# Patient Record
Sex: MALE | Race: BLACK OR AFRICAN AMERICAN | Employment: OTHER
[De-identification: names, ages, dates, MRNs, and addresses within clinical notes are randomized per-mention and may not be internally consistent; named-entity substitution may affect disease eponyms.]

---

## 2023-11-09 ENCOUNTER — OFFICE VISIT (OUTPATIENT)
Facility: CLINIC | Age: 78
End: 2023-11-09
Payer: MEDICARE

## 2023-11-09 ENCOUNTER — HOSPITAL ENCOUNTER (OUTPATIENT)
Facility: HOSPITAL | Age: 78
Setting detail: SPECIMEN
Discharge: HOME | End: 2023-11-09
Payer: MEDICARE

## 2023-11-09 VITALS
BODY MASS INDEX: 21.56 KG/M2 | OXYGEN SATURATION: 96 % | SYSTOLIC BLOOD PRESSURE: 152 MMHG | HEART RATE: 67 BPM | DIASTOLIC BLOOD PRESSURE: 84 MMHG | HEIGHT: 74 IN | RESPIRATION RATE: 22 BRPM | TEMPERATURE: 96.8 F | WEIGHT: 168 LBS

## 2023-11-09 DIAGNOSIS — L85.3 DRY SKIN: ICD-10-CM

## 2023-11-09 DIAGNOSIS — R79.9 ABNORMAL FINDING OF BLOOD CHEMISTRY, UNSPECIFIED: ICD-10-CM

## 2023-11-09 DIAGNOSIS — F03.90 DEMENTIA, UNSPECIFIED DEMENTIA SEVERITY, UNSPECIFIED DEMENTIA TYPE, UNSPECIFIED WHETHER BEHAVIORAL, PSYCHOTIC, OR MOOD DISTURBANCE OR ANXIETY (HCC): ICD-10-CM

## 2023-11-09 DIAGNOSIS — B35.1 ONYCHOMYCOSIS: ICD-10-CM

## 2023-11-09 DIAGNOSIS — R26.2 AMBULATORY DYSFUNCTION: ICD-10-CM

## 2023-11-09 DIAGNOSIS — R53.1 WEAKNESS: ICD-10-CM

## 2023-11-09 DIAGNOSIS — Z11.59 ENCOUNTER FOR HEPATITIS C SCREENING TEST FOR LOW RISK PATIENT: ICD-10-CM

## 2023-11-09 DIAGNOSIS — N18.6 ESRD (END STAGE RENAL DISEASE) (HCC): ICD-10-CM

## 2023-11-09 DIAGNOSIS — I10 PRIMARY HYPERTENSION: ICD-10-CM

## 2023-11-09 DIAGNOSIS — Z86.718 HISTORY OF DVT (DEEP VEIN THROMBOSIS): ICD-10-CM

## 2023-11-09 DIAGNOSIS — N18.5 CKD (CHRONIC KIDNEY DISEASE) STAGE 5, GFR LESS THAN 15 ML/MIN (HCC): ICD-10-CM

## 2023-11-09 DIAGNOSIS — H91.90 HEARING LOSS, UNSPECIFIED HEARING LOSS TYPE, UNSPECIFIED LATERALITY: ICD-10-CM

## 2023-11-09 DIAGNOSIS — D63.8 ANEMIA OF CHRONIC DISEASE: ICD-10-CM

## 2023-11-09 DIAGNOSIS — S81.802A OPEN WOUND OF LEFT LOWER EXTREMITY, INITIAL ENCOUNTER: Primary | ICD-10-CM

## 2023-11-09 DIAGNOSIS — Z91.81 AT HIGH RISK FOR FALLS: ICD-10-CM

## 2023-11-09 DIAGNOSIS — Z76.89 ESTABLISHING CARE WITH NEW DOCTOR, ENCOUNTER FOR: ICD-10-CM

## 2023-11-09 LAB
ALBUMIN SERPL-MCNC: 2.7 G/DL (ref 3.4–5)
ALBUMIN/GLOB SERPL: 0.5 (ref 0.8–1.7)
ALP SERPL-CCNC: 104 U/L (ref 45–117)
ALT SERPL-CCNC: 18 U/L (ref 16–61)
ANION GAP SERPL CALC-SCNC: 6 MMOL/L (ref 3–18)
AST SERPL-CCNC: 18 U/L (ref 10–38)
BASOPHILS # BLD: 0.1 K/UL (ref 0–0.1)
BASOPHILS NFR BLD: 1 % (ref 0–2)
BILIRUB SERPL-MCNC: 1.1 MG/DL (ref 0.2–1)
BUN SERPL-MCNC: 28 MG/DL (ref 7–18)
BUN/CREAT SERPL: 4 (ref 12–20)
CALCIUM SERPL-MCNC: 9.9 MG/DL (ref 8.5–10.1)
CHLORIDE SERPL-SCNC: 99 MMOL/L (ref 100–111)
CHOLEST SERPL-MCNC: 146 MG/DL
CO2 SERPL-SCNC: 30 MMOL/L (ref 21–32)
CREAT SERPL-MCNC: 6.41 MG/DL (ref 0.6–1.3)
DIFFERENTIAL METHOD BLD: ABNORMAL
EOSINOPHIL # BLD: 0.4 K/UL (ref 0–0.4)
EOSINOPHIL NFR BLD: 6 % (ref 0–5)
ERYTHROCYTE [DISTWIDTH] IN BLOOD BY AUTOMATED COUNT: 17.8 % (ref 11.6–14.5)
EST. AVERAGE GLUCOSE BLD GHB EST-MCNC: 97 MG/DL
GLOBULIN SER CALC-MCNC: 5.6 G/DL (ref 2–4)
GLUCOSE SERPL-MCNC: 116 MG/DL (ref 74–99)
HBA1C MFR BLD: 5 % (ref 4.2–5.6)
HCT VFR BLD AUTO: 33.7 % (ref 36–48)
HDLC SERPL-MCNC: 83 MG/DL (ref 40–60)
HDLC SERPL: 1.8 (ref 0–5)
HGB BLD-MCNC: 10.2 G/DL (ref 13–16)
IMM GRANULOCYTES # BLD AUTO: 0 K/UL (ref 0–0.04)
IMM GRANULOCYTES NFR BLD AUTO: 0 % (ref 0–0.5)
LDLC SERPL CALC-MCNC: 51 MG/DL (ref 0–100)
LIPID PANEL: ABNORMAL
LYMPHOCYTES # BLD: 1.8 K/UL (ref 0.9–3.6)
LYMPHOCYTES NFR BLD: 29 % (ref 21–52)
MCH RBC QN AUTO: 27.2 PG (ref 24–34)
MCHC RBC AUTO-ENTMCNC: 30.3 G/DL (ref 31–37)
MCV RBC AUTO: 89.9 FL (ref 78–100)
MONOCYTES # BLD: 0.8 K/UL (ref 0.05–1.2)
MONOCYTES NFR BLD: 13 % (ref 3–10)
NEUTS SEG # BLD: 3.2 K/UL (ref 1.8–8)
NEUTS SEG NFR BLD: 51 % (ref 40–73)
NRBC # BLD: 0 K/UL (ref 0–0.01)
NRBC BLD-RTO: 0 PER 100 WBC
PLATELET # BLD AUTO: 157 K/UL (ref 135–420)
PMV BLD AUTO: 11 FL (ref 9.2–11.8)
POTASSIUM SERPL-SCNC: 4.2 MMOL/L (ref 3.5–5.5)
PROT SERPL-MCNC: 8.3 G/DL (ref 6.4–8.2)
RBC # BLD AUTO: 3.75 M/UL (ref 4.35–5.65)
SODIUM SERPL-SCNC: 135 MMOL/L (ref 136–145)
TRIGL SERPL-MCNC: 60 MG/DL
VLDLC SERPL CALC-MCNC: 12 MG/DL
WBC # BLD AUTO: 6.2 K/UL (ref 4.6–13.2)

## 2023-11-09 PROCEDURE — 80053 COMPREHEN METABOLIC PANEL: CPT

## 2023-11-09 PROCEDURE — 86803 HEPATITIS C AB TEST: CPT

## 2023-11-09 PROCEDURE — 3079F DIAST BP 80-89 MM HG: CPT | Performed by: STUDENT IN AN ORGANIZED HEALTH CARE EDUCATION/TRAINING PROGRAM

## 2023-11-09 PROCEDURE — 83036 HEMOGLOBIN GLYCOSYLATED A1C: CPT

## 2023-11-09 PROCEDURE — 1036F TOBACCO NON-USER: CPT | Performed by: STUDENT IN AN ORGANIZED HEALTH CARE EDUCATION/TRAINING PROGRAM

## 2023-11-09 PROCEDURE — 3077F SYST BP >= 140 MM HG: CPT | Performed by: STUDENT IN AN ORGANIZED HEALTH CARE EDUCATION/TRAINING PROGRAM

## 2023-11-09 PROCEDURE — 99204 OFFICE O/P NEW MOD 45 MIN: CPT | Performed by: STUDENT IN AN ORGANIZED HEALTH CARE EDUCATION/TRAINING PROGRAM

## 2023-11-09 PROCEDURE — G8420 CALC BMI NORM PARAMETERS: HCPCS | Performed by: STUDENT IN AN ORGANIZED HEALTH CARE EDUCATION/TRAINING PROGRAM

## 2023-11-09 PROCEDURE — 99417 PROLNG OP E/M EACH 15 MIN: CPT | Performed by: STUDENT IN AN ORGANIZED HEALTH CARE EDUCATION/TRAINING PROGRAM

## 2023-11-09 PROCEDURE — G8484 FLU IMMUNIZE NO ADMIN: HCPCS | Performed by: STUDENT IN AN ORGANIZED HEALTH CARE EDUCATION/TRAINING PROGRAM

## 2023-11-09 PROCEDURE — 1123F ACP DISCUSS/DSCN MKR DOCD: CPT | Performed by: STUDENT IN AN ORGANIZED HEALTH CARE EDUCATION/TRAINING PROGRAM

## 2023-11-09 PROCEDURE — 85025 COMPLETE CBC W/AUTO DIFF WBC: CPT

## 2023-11-09 PROCEDURE — G8427 DOCREV CUR MEDS BY ELIG CLIN: HCPCS | Performed by: STUDENT IN AN ORGANIZED HEALTH CARE EDUCATION/TRAINING PROGRAM

## 2023-11-09 PROCEDURE — 80061 LIPID PANEL: CPT

## 2023-11-09 PROCEDURE — 36415 COLL VENOUS BLD VENIPUNCTURE: CPT

## 2023-11-09 RX ORDER — SENNOSIDES 8.6 MG
650 CAPSULE ORAL EVERY 8 HOURS PRN
Qty: 60 TABLET | Refills: 3 | Status: SHIPPED | OUTPATIENT
Start: 2023-11-09

## 2023-11-09 RX ORDER — AMLODIPINE BESYLATE 10 MG/1
10 TABLET ORAL DAILY
Qty: 90 TABLET | Refills: 2 | Status: SHIPPED | OUTPATIENT
Start: 2023-11-09

## 2023-11-09 RX ORDER — TRAMADOL HYDROCHLORIDE 50 MG/1
50 TABLET ORAL EVERY 6 HOURS PRN
COMMUNITY

## 2023-11-09 RX ORDER — CALCITRIOL 0.5 UG/1
0.5 CAPSULE, LIQUID FILLED ORAL
COMMUNITY

## 2023-11-09 RX ORDER — CINACALCET 30 MG/1
30 TABLET, FILM COATED ORAL
COMMUNITY

## 2023-11-09 RX ORDER — QUETIAPINE FUMARATE 50 MG/1
50 TABLET, EXTENDED RELEASE ORAL NIGHTLY
COMMUNITY

## 2023-11-09 RX ORDER — CALCIUM ACETATE 667 MG/1
2 TABLET ORAL SEE ADMIN INSTRUCTIONS
COMMUNITY

## 2023-11-09 RX ORDER — PROMETHAZINE HYDROCHLORIDE 12.5 MG/1
12.5 TABLET ORAL EVERY 6 HOURS PRN
Qty: 120 TABLET | Refills: 2 | Status: SHIPPED | OUTPATIENT
Start: 2023-11-09

## 2023-11-09 RX ORDER — AMMONIUM LACTATE 12 G/100G
CREAM TOPICAL
Qty: 385 G | Refills: 4 | Status: SHIPPED | OUTPATIENT
Start: 2023-11-09 | End: 2023-12-09

## 2023-11-09 RX ORDER — MECLIZINE HYDROCHLORIDE 25 MG/1
25 TABLET ORAL 3 TIMES DAILY PRN
COMMUNITY

## 2023-11-09 RX ORDER — AMLODIPINE BESYLATE 10 MG/1
10 TABLET ORAL DAILY
COMMUNITY
Start: 2023-10-04 | End: 2023-11-09 | Stop reason: SDUPTHER

## 2023-11-09 RX ORDER — MEMANTINE HYDROCHLORIDE 5 MG/1
5 TABLET ORAL 2 TIMES DAILY
COMMUNITY
Start: 2023-10-04

## 2023-11-09 RX ORDER — SUCROFERRIC OXYHYDROXIDE 500 MG/1
500 TABLET, CHEWABLE ORAL
COMMUNITY

## 2023-11-09 RX ORDER — PROMETHAZINE HYDROCHLORIDE 12.5 MG/1
12.5 TABLET ORAL EVERY 6 HOURS PRN
COMMUNITY
End: 2023-11-09 | Stop reason: SDUPTHER

## 2023-11-09 ASSESSMENT — PATIENT HEALTH QUESTIONNAIRE - PHQ9
SUM OF ALL RESPONSES TO PHQ QUESTIONS 1-9: 0
2. FEELING DOWN, DEPRESSED OR HOPELESS: 0
SUM OF ALL RESPONSES TO PHQ QUESTIONS 1-9: 0
1. LITTLE INTEREST OR PLEASURE IN DOING THINGS: 0
SUM OF ALL RESPONSES TO PHQ QUESTIONS 1-9: 0
SUM OF ALL RESPONSES TO PHQ QUESTIONS 1-9: 0
SUM OF ALL RESPONSES TO PHQ9 QUESTIONS 1 & 2: 0

## 2023-11-09 ASSESSMENT — ENCOUNTER SYMPTOMS
SHORTNESS OF BREATH: 0
ABDOMINAL PAIN: 0

## 2023-11-09 NOTE — PROGRESS NOTES
1. \"Have you been to the ER, urgent care clinic since your last visit? Hospitalized since your last visit? \" No    2. \"Have you seen or consulted any other health care providers outside of the [address] [place] [address] since your last visit? \" No    3. For patients aged 43-73: Has the patient had a colonoscopy / FIT/ Cologuard? No      If the patient is female:    4. For patients aged 43-66: Has the patient had a mammogram within the past 2 years? No      5. For patients aged 21-65: Has the patient had a pap smear?  NA - based on age or sex

## 2023-11-10 ENCOUNTER — TELEPHONE (OUTPATIENT)
Facility: CLINIC | Age: 78
End: 2023-11-10

## 2023-11-10 ENCOUNTER — HOME HEALTH ADMISSION (OUTPATIENT)
Age: 78
End: 2023-11-10
Payer: MEDICARE

## 2023-11-10 LAB
HCV AB SER IA-ACNC: 0.07 INDEX
HCV AB SERPL QL IA: NEGATIVE
HEPATITIS C COMMENT: NORMAL

## 2023-11-10 NOTE — TELEPHONE ENCOUNTER
Pharmacy requesting alternative for Eliquis 5MG Tablet. Pharmacy comments:Xarelto preferred by insurance.

## 2023-11-11 ENCOUNTER — HOME CARE VISIT (OUTPATIENT)
Age: 78
End: 2023-11-11

## 2023-11-11 PROCEDURE — G0299 HHS/HOSPICE OF RN EA 15 MIN: HCPCS

## 2023-11-11 PROCEDURE — 0221000100 HH NO PAY CLAIM PROCEDURE

## 2023-11-13 ENCOUNTER — HOME CARE VISIT (OUTPATIENT)
Age: 78
End: 2023-11-13

## 2023-11-13 ENCOUNTER — TELEPHONE (OUTPATIENT)
Facility: CLINIC | Age: 78
End: 2023-11-13

## 2023-11-13 VITALS
OXYGEN SATURATION: 99 % | HEART RATE: 84 BPM | SYSTOLIC BLOOD PRESSURE: 128 MMHG | DIASTOLIC BLOOD PRESSURE: 70 MMHG | RESPIRATION RATE: 16 BRPM | TEMPERATURE: 98 F

## 2023-11-13 VITALS
TEMPERATURE: 98 F | HEART RATE: 76 BPM | OXYGEN SATURATION: 97 % | SYSTOLIC BLOOD PRESSURE: 120 MMHG | DIASTOLIC BLOOD PRESSURE: 76 MMHG | RESPIRATION RATE: 16 BRPM

## 2023-11-13 PROCEDURE — G0300 HHS/HOSPICE OF LPN EA 15 MIN: HCPCS

## 2023-11-13 ASSESSMENT — ENCOUNTER SYMPTOMS
HEMOPTYSIS: 0
PAIN LOCATION - PAIN QUALITY: SHARP/THROBBING

## 2023-11-13 NOTE — TELEPHONE ENCOUNTER
Per fax from [facility]:    Tyson Schwartz is preferred by the insurance company instead of Eliquis. Please advise.

## 2023-11-13 NOTE — CASE COMMUNICATION
[facility] completd on 11/11/23. Large wound to posterior Left lower Leg  I recommend cleansing with N/S, Apply adaptic to open areas, cover with Aquacel AG and wrap with kerlix and ACE, 3x/week. SN/PT/OT/MSW  all ordered and accepted by patiuent/CG. He is not ambulatory at present, pivots to chair from bed, toilet with assistance.

## 2023-11-13 NOTE — HOME HEALTH
Skilled services/Home bound verification:  PATIENT AND CAREGIVER UNDERSTAND HOMEBOUND STATUS. Skilled Reason for admission/summary of clinical condition:   Open wound of left lower extremity    This patient is homebound for the following reasons Requires considerable and taxing effort to leave the home , Requires the assistance of 1 or more persons to leave the home  and Only leaves the home for medical reasons or [employer] services and are infrequent and of short duration for other reasons . Caregiver: Daughter May Tim is caregiver, available 24/7 and she . Assists with ADLs, Medication management, Transportation to appointments, Meal prep and assists with ambulation/transfers. Medications reconciled and all medications are available in the home this visit. The following education was provided regarding medications: All medications should be given the same time daily. Medications  are effective at this time. High risk medication teaching regarding anticoagulants,  antipsychotics, opioid/narcotics performed. Patient is on Eliquis  I discussed the signs of butch and occult bleeding, prevention of bleeding when taking anticoagulants. and when to call MD.  He is also on an Antipsychotic Seroquel I went over the side effects, ie: heart pounding, GI upset, weakness, blurred vision increased appetite unusual dreams or nightmares and to report any of these to MD.  Patient is also on Ultram  I discussed the risk of addiction and increase in respiratory depression and side effect of constipation. Home health supplies by type and quantity ordered/delivered this visit include:I brought wound care supplies into the home and ordered more on 11/13/23. Patient education provided this visit to include:  Patient is a fall risk, I recommend supervision at all times, keeping walk ways/halls clear of clutter. Patient is able to stand and pivot with assist to go to wheelchair and or bathroom.

## 2023-11-14 ENCOUNTER — OFFICE VISIT (OUTPATIENT)
Age: 78
End: 2023-11-14
Payer: MEDICARE

## 2023-11-14 VITALS
SYSTOLIC BLOOD PRESSURE: 134 MMHG | HEIGHT: 74 IN | DIASTOLIC BLOOD PRESSURE: 74 MMHG | BODY MASS INDEX: 21.56 KG/M2 | WEIGHT: 168 LBS | HEART RATE: 78 BPM

## 2023-11-14 DIAGNOSIS — I87.2 CHRONIC VENOUS INSUFFICIENCY: Primary | ICD-10-CM

## 2023-11-14 DIAGNOSIS — Z86.718 HISTORY OF DVT (DEEP VEIN THROMBOSIS): ICD-10-CM

## 2023-11-14 DIAGNOSIS — I83.009 VENOUS ULCER (HCC): ICD-10-CM

## 2023-11-14 DIAGNOSIS — L97.909 VENOUS ULCER (HCC): ICD-10-CM

## 2023-11-14 PROCEDURE — 1123F ACP DISCUSS/DSCN MKR DOCD: CPT | Performed by: SURGERY

## 2023-11-14 PROCEDURE — 1036F TOBACCO NON-USER: CPT | Performed by: SURGERY

## 2023-11-14 PROCEDURE — 3075F SYST BP GE 130 - 139MM HG: CPT | Performed by: SURGERY

## 2023-11-14 PROCEDURE — G8427 DOCREV CUR MEDS BY ELIG CLIN: HCPCS | Performed by: SURGERY

## 2023-11-14 PROCEDURE — G8484 FLU IMMUNIZE NO ADMIN: HCPCS | Performed by: SURGERY

## 2023-11-14 PROCEDURE — 3078F DIAST BP <80 MM HG: CPT | Performed by: SURGERY

## 2023-11-14 PROCEDURE — G8420 CALC BMI NORM PARAMETERS: HCPCS | Performed by: SURGERY

## 2023-11-14 PROCEDURE — 99202 OFFICE O/P NEW SF 15 MIN: CPT | Performed by: SURGERY

## 2023-11-14 NOTE — PROGRESS NOTES
Thomas Landrum is a 78 y.o. male (: 1945) presenting to address:    Chief Complaint   Patient presents with    New Patient     Open wound of left lower leg since later sep 2023       Medication list and allergies have been reviewed with Anel Diallo and updated as of today's date. I have gone over all Medical, Surgical and Social History with Thomas Landrum and updated/added the information accordingly.

## 2023-11-14 NOTE — HOME HEALTH
Skilled Needs: Education and Wound Care   Caregiver involvement: Pt independent with care with the exception of wound treatment due to location   Medications reviewed and all medications are available in the home this visit. The following education was provided regarding medications:  JUANJO BIRD notified of any discrepancies/look a-like medications/medication interactions: JUANJO  Medications are effecrtive at this time. Home health supplies by type and quantity ordered/delivered this visit include:  Supplies available   Patient education provided this visit:  Nurse arrived pt was still at dialysis. Nurse waited for pt to return home. Ester Hemphill verbalized pt is due to see wound MD martín at [address] [place] [address]. Pt has dialysis [facility] and is not home until atleast 4pm. Wound care compeleted as ordered Minial supplies available will order supplies tomorrow. Set schedule for the rest of the week with PAL Moscoso education provided: JUANJO  Patient level of understanding of education provided: Дмитрий Bynum all understanding to above education  Skilled Care Performed this visit: Education and Wound Care  Patient response to procedure performed: Minimal pain during dressing change   Agency Progress toward goals: Progressing toward interventions above  Patient's Progress towards personal goals: when patient reaches goals and medication is managed, and disease processes are understood patient agrees and understand that discharge will take place

## 2023-11-14 NOTE — PROGRESS NOTES
Patient seen and examined. He is here with his granddaughter and he is hard of hearing. 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 I explained to the daughter that this should be followed by the vascular team and no need for any general surgery intervention.

## 2023-11-15 ENCOUNTER — TELEPHONE (OUTPATIENT)
Facility: CLINIC | Age: 78
End: 2023-11-15

## 2023-11-15 NOTE — TELEPHONE ENCOUNTER
Per Dr. Peres Dumas:    Unique Connie Clemens  (Newest Message First)  View All Conversations on this Encounter  Saintclair Haggard, DO  You 2 days ago       This pt came from the nursing home on this medication. Is there any way we can get him one of the discount cards that we have for Eliquis?  Thanks      You routed conversation to Saintclair Haggard, DO

## 2023-11-15 NOTE — TELEPHONE ENCOUNTER
Discussed this with Kayla. Pt daughter called yesterday as requested to change pharmacy to [facility] as this better covered. Rx was sent there on 11/14.

## 2023-11-16 ENCOUNTER — HOME CARE VISIT (OUTPATIENT)
Age: 78
End: 2023-11-16

## 2023-11-16 ENCOUNTER — TELEPHONE (OUTPATIENT)
Facility: CLINIC | Age: 78
End: 2023-11-16

## 2023-11-16 VITALS
HEART RATE: 69 BPM | OXYGEN SATURATION: 96 % | SYSTOLIC BLOOD PRESSURE: 110 MMHG | DIASTOLIC BLOOD PRESSURE: 70 MMHG | RESPIRATION RATE: 15 BRPM | TEMPERATURE: 97.8 F

## 2023-11-16 DIAGNOSIS — Z86.718 HISTORY OF DVT (DEEP VEIN THROMBOSIS): Primary | ICD-10-CM

## 2023-11-16 DIAGNOSIS — I82.5Y1 CHRONIC DEEP VEIN THROMBOSIS (DVT) OF PROXIMAL VEIN OF RIGHT LOWER EXTREMITY (HCC): Primary | ICD-10-CM

## 2023-11-16 DIAGNOSIS — S81.802A OPEN WOUND OF LEFT LOWER EXTREMITY, INITIAL ENCOUNTER: ICD-10-CM

## 2023-11-16 PROCEDURE — G0151 HHCP-SERV OF PT,EA 15 MIN: HCPCS

## 2023-11-16 PROCEDURE — G0300 HHS/HOSPICE OF LPN EA 15 MIN: HCPCS

## 2023-11-16 ASSESSMENT — ENCOUNTER SYMPTOMS: PAIN LOCATION - PAIN QUALITY: ACHE

## 2023-11-16 NOTE — TELEPHONE ENCOUNTER
Spoke with daughter Natanael Romano. Xarelto is $600 with savings card. Unable to afford. Pt was previously on warfarin. No longer has secondary insurance as it lapsed.  Will try to see what savings program he qualifies for

## 2023-11-17 VITALS
DIASTOLIC BLOOD PRESSURE: 70 MMHG | HEART RATE: 67 BPM | TEMPERATURE: 97 F | RESPIRATION RATE: 16 BRPM | SYSTOLIC BLOOD PRESSURE: 126 MMHG | OXYGEN SATURATION: 98 %

## 2023-11-17 NOTE — TELEPHONE ENCOUNTER
Discussed Eliquis continuation case with insurance. Requested urgent submission.    Ref # [place]- [id number]

## 2023-11-17 NOTE — HOME HEALTH
bed    Pt/Caregiver instructed on plan of care and are agreeable to plan of care      Discharge planning discussed with patient and caregiver. DC to self and family under MD supervision when all goals are met or maximum potential is reached. Pt/Caregiver did verbalize understanding of DC planning. Next MD appointment TBD with Martin Pereira DO. Patient/caregiver instructed to keep appointment as lack of follow through with MD appointment could result in discontinuation of home care services for non-compliance.

## 2023-11-18 ENCOUNTER — HOME CARE VISIT (OUTPATIENT)
Age: 78
End: 2023-11-18
Payer: MEDICARE

## 2023-11-18 NOTE — HOME HEALTH
Skilled Needs: Education and Wound Care   Caregiver involvement: Pt independent with care with the exception of wound treatment due to location   Medications reviewed and all medications are available in the home this visit. The following education was provided regarding medications:  JUANJO BIRD notified of any discrepancies/look a-like medications/medication interactions: JUANJO  Medications are effecrtive at this time. Home health supplies by type and quantity ordered/delivered this visit include:  Supplies available   Patient education provided this visit:  Reviewed to reported any redness, swelling, warmth, fever, chills, increased heart/respiratory rate, uncontrolled pain/tenderness, drainage:green/yellow/brown, or odor to MD immediately. Wound care completed s ordered Pt is going to dialysis next week on sunday tuesday and friday. Scheduled accordingly.    Sharps education provided: JUANJO  Patient level of understanding of education provided: Darvin Doll all understanding to above education  Skilled Care Performed this visit: Education and Wound Care  Patient response to procedure performed: Minimal pain during dressing change   Agency Progress toward goals: Progressing toward interventions above  Patient's Progress towards personal goals: when patient reaches goals and medication is managed, and disease processes are understood patient agrees and understand that discharge will take place

## 2023-11-20 ENCOUNTER — HOME CARE VISIT (OUTPATIENT)
Age: 78
End: 2023-11-20
Payer: MEDICARE

## 2023-11-20 VITALS
SYSTOLIC BLOOD PRESSURE: 110 MMHG | RESPIRATION RATE: 16 BRPM | TEMPERATURE: 97.6 F | DIASTOLIC BLOOD PRESSURE: 68 MMHG | OXYGEN SATURATION: 95 % | HEART RATE: 62 BPM

## 2023-11-20 VITALS
DIASTOLIC BLOOD PRESSURE: 70 MMHG | HEART RATE: 69 BPM | SYSTOLIC BLOOD PRESSURE: 110 MMHG | RESPIRATION RATE: 16 BRPM | TEMPERATURE: 97 F | OXYGEN SATURATION: 96 %

## 2023-11-20 PROCEDURE — G0157 HHC PT ASSISTANT EA 15: HCPCS

## 2023-11-20 PROCEDURE — G0155 HHCP-SVS OF CSW,EA 15 MIN: HCPCS

## 2023-11-20 PROCEDURE — G0152 HHCP-SERV OF OT,EA 15 MIN: HCPCS

## 2023-11-20 PROCEDURE — G0300 HHS/HOSPICE OF LPN EA 15 MIN: HCPCS

## 2023-11-20 NOTE — HOME HEALTH
SUBJECTIVE: Patient was feeling a little tired this afternoon after having visits from OT and nurse already today. CAREGIVER INVOLVEMENT/ASSISTANCE NEEDED FOR: Patient's daughter, Tushar Pablo, assists with transportation, ADLS, and IADLS. HOME HEALTH SUPPLIES BY TYPE AND QUANTITY ORDERED/DELIVERED THIS VISIT INCLUDE: None needed for this visit  OBJECTIVE:  See interventions. PATIENT RESPONSE TO TREATMENT: Patient had no c/o pain after walking and exercising. PATIENT LEVEL OF UNDERSTANDING OF EDUCATION PROVIDED: Patient verbalized understanding on fall prevention, non-pharmacological pain management techniques, and sign/symptoms of infection. ASSESSMENT OF PROGRESS TOWARD GOALS: Patient addressed goals for gait, transfers, and strength this visit. Pt needed SBA for gait training to ambulate safely. Patient ambulated with decreased cheryl, decreased foot clearance, and forward flexed trunk. Gave multiple vc's for pt to lift B foot higher to clear floor safely and to maintain erect posture. Pt needs reinforcement for safety with gait and to improve posture. Pt also needed SBA to perform sit < > stand transfers. Gave vc's for pt to lean forward and to push off using B UE to stand up. Pt needs reinforcement to improve transfer technique. In addition, instructed pt on performing supine therapeutic exercises and to comply with HEP. CONTINUED NEED FOR THE FOLLOWING SKILLS: Gait Training, Stairs Training, Transfer Training, [employer], Balance Training, and Therapeutic Exercises. PLAN FOR NEXT VISIT: Patient will be seen 1w1 2w2 1w1 to increase safety with gait, to increase safety with stairs, to improve transfer technique, to improve bed mobility technique, to improve balance, and to increase strength of B LE. DISCHARGE PLANNING DISCUSSED WITH PATIENT/CAREGIVER: Discharge patient to family with MD supervision when all goals are met. Pt/Caregiver did verbalize understanding of discharge planning.

## 2023-11-20 NOTE — HOME HEALTH
Skilled Needs: Education and Wound Care   Caregiver involvement: Pt independent with care with the exception of wound treatment due to location   Medications reviewed and all medications are available in the home this visit. The following education was provided regarding medications:  JUANJO BIRD notified of any discrepancies/look a-like medications/medication interactions: JUANJO  Medications are effecrtive at this time. Home health supplies by type and quantity ordered/delivered this visit include:  Supplies available   Patient education provided this visit:  Reviewed to reported any redness, swelling, warmth, fever, chills, increased heart/respiratory rate, uncontrolled pain/tenderness, drainage:green/yellow/brown, or odor to MD immediately. pt continues to go to dialysis and nurse continues to monitor wound and complete wound care 2x a week. No s.s of infection noted this VIist. Minial to no pain voiced during dressing changes today. Pt change in medication.    Sharps education provided: JUANJO  Patient level of understanding of education provided: Kandice Ayoub all understanding to above education  Skilled Care Performed this visit: Education and Wound Care  Patient response to procedure performed: Minimal pain during dressing change   Agency Progress toward goals: Progressing toward interventions above  Patient's Progress towards personal goals: when patient reaches goals and medication is managed, and disease processes are understood patient agrees and understand that discharge will take place

## 2023-11-21 NOTE — CASE COMMUNICATION
Occupational Therapy Evaluation    1w4    Mr. Dash Go is ambulating with use of his rollator in the home. Cues needed to lock rollator with transfers and unlock for gait. Recommended removal of area rug in bathroom as pt reports having difficulty standing at the sink without the rug slipping. Instead, suggested nonskid mat placed at the bathroom sink where pt stands to sponge bathe. Mr. Dash Go reports that he pushes his rollator against  the tub to keep it steady when performing sit <>  the bathroom during sponge bathing. He has a standard height toilet and perform sit <> stand by pushing self up from the bathroom sink. Increased effort noted with pt transfer. Suggested an elevated toilet seat for ease with transfer and to reduce strain on back and knees. Pt's granddaughter and daughter agreeable to consider. Mr. Dash Go requires CGA for safety when performing  LB ADL to include dressing, LB sponge bathing and toileting due to balance deficits. He requires assistance to set out bathing and clothing items. He is agreeable for ongoing OT visits to instruct on energy conservation strategies, use of DME for safety with transfers and address balance deficits with ADL.

## 2023-11-22 ENCOUNTER — HOME CARE VISIT (OUTPATIENT)
Age: 78
End: 2023-11-22
Payer: MEDICARE

## 2023-11-22 VITALS
DIASTOLIC BLOOD PRESSURE: 78 MMHG | HEART RATE: 75 BPM | RESPIRATION RATE: 16 BRPM | TEMPERATURE: 97.2 F | OXYGEN SATURATION: 96 % | SYSTOLIC BLOOD PRESSURE: 120 MMHG

## 2023-11-22 VITALS
RESPIRATION RATE: 16 BRPM | DIASTOLIC BLOOD PRESSURE: 70 MMHG | HEART RATE: 69 BPM | OXYGEN SATURATION: 96 % | TEMPERATURE: 97.5 F | SYSTOLIC BLOOD PRESSURE: 110 MMHG

## 2023-11-22 PROCEDURE — G0157 HHC PT ASSISTANT EA 15: HCPCS

## 2023-11-22 ASSESSMENT — ENCOUNTER SYMPTOMS: PAIN LOCATION - PAIN QUALITY: SHARP

## 2023-11-22 NOTE — HOME HEALTH
SUBJECTIVE: Patient reported that his pain from his L leg stays high due to his wound. CAREGIVER INVOLVEMENT/ASSISTANCE NEEDED FOR: Patient's daughter, Marguerite Gilbert, assists with transportation, ADLS, and IADLS. HOME HEALTH SUPPLIES BY TYPE AND QUANTITY ORDERED/DELIVERED THIS VISIT INCLUDE: None needed for this visit  OBJECTIVE:  See interventions. PATIENT RESPONSE TO TREATMENT: Patient c/o slight increase in pain while ambulating and performing therapeutic exercises. PATIENT LEVEL OF UNDERSTANDING OF EDUCATION PROVIDED: Patient verbalized understanding on fall prevention, non-pharmacological pain management techniques, and sign/symptoms of infection. ASSESSMENT OF PROGRESS TOWARD GOALS: Patient addressed goals for gait, transfers, and strength this visit. Pt previously needed SBA to ambulate safely but progressed to Close S for gait training today. Patient ambulated with decreased cheryl, decreased foot clearance, and forward flexed trunk. Gave multiple vc's for pt to lift B foot higher to clear floor safely and to maintain erect posture. Pt needs reinforcement for safety with gait and to improve posture. Pt also previously needed SBA to perform sit < > stand transfers but progressed to Close S for transfer training today. Gave vc's for pt to lean forward and to push off using B UE to stand up. Pt needs reinforcement to improve transfer technique. In addition, instructed pt on performing seated therapeutic exercises in addition to supine therapeutic exercises. Reinforced compliance with HEP. CONTINUED NEED FOR THE FOLLOWING SKILLS: Gait Training, Stairs Training, Transfer Training, [employer], Balance Training, and Therapeutic Exercises. PLAN FOR NEXT VISIT: Patient will be seen 2w2 1w1 to increase safety with gait, to increase safety with stairs, to improve transfer technique, to improve bed mobility technique, to improve balance, and to increase strength of B LE.   DISCHARGE PLANNING DISCUSSED WITH

## 2023-11-23 ENCOUNTER — HOME CARE VISIT (OUTPATIENT)
Age: 78
End: 2023-11-23
Payer: MEDICARE

## 2023-11-23 PROCEDURE — G0300 HHS/HOSPICE OF LPN EA 15 MIN: HCPCS

## 2023-11-25 ENCOUNTER — HOME CARE VISIT (OUTPATIENT)
Age: 78
End: 2023-11-25
Payer: MEDICARE

## 2023-11-26 VITALS
RESPIRATION RATE: 18 BRPM | HEART RATE: 67 BPM | TEMPERATURE: 97 F | SYSTOLIC BLOOD PRESSURE: 110 MMHG | DIASTOLIC BLOOD PRESSURE: 78 MMHG | OXYGEN SATURATION: 98 %

## 2023-11-27 ENCOUNTER — HOME CARE VISIT (OUTPATIENT)
Age: 78
End: 2023-11-27
Payer: MEDICARE

## 2023-11-27 VITALS
OXYGEN SATURATION: 100 % | TEMPERATURE: 98.1 F | HEART RATE: 62 BPM | DIASTOLIC BLOOD PRESSURE: 70 MMHG | SYSTOLIC BLOOD PRESSURE: 120 MMHG | RESPIRATION RATE: 16 BRPM

## 2023-11-27 PROCEDURE — G0300 HHS/HOSPICE OF LPN EA 15 MIN: HCPCS

## 2023-11-27 NOTE — HOME HEALTH
Skilled Needs: Education and Wound Care   Caregiver involvement: Pt independent with care with the exception of wound treatment and has assistance from family for medication and transportation   Medications reviewed and all medications are available in the home this visit. The following education was provided regarding medications:  JUANJO BIRD notified of any discrepancies/look a-like medications/medication interactions: NA  Medications are effecrtive at this time. Home health supplies by type and quantity ordered/delivered this visit include:  Supplies available   Patient education provided this visit:  Reviewed to reported any redness, swelling, warmth, fever, chills, increased heart/respiratory rate, uncontrolled pain/tenderness, drainage:green/yellow/brown, or odor to MD immediately. Wound care completed measuremtns showing signs of improvement as evident in decrease in size. Supplies available for wound care. Pt contiues to go to dialysis 3x a week and no signs of infection noted.     Sharps education provided: JUANJO  Patient level of understanding of education provided: Dossie Shank all understanding to above education  Skilled Care Performed this visit: Education and Wound Care  Patient response to procedure performed: Minimal pain during dressing change   Agency Progress toward goals: Progressing toward interventions above  Patient's Progress towards personal goals: when patient reaches goals and medication is managed, and disease processes are understood patient agrees and understand that discharge will take place

## 2023-11-28 ENCOUNTER — HOME CARE VISIT (OUTPATIENT)
Age: 78
End: 2023-11-28
Payer: MEDICARE

## 2023-11-28 VITALS
TEMPERATURE: 98 F | SYSTOLIC BLOOD PRESSURE: 122 MMHG | RESPIRATION RATE: 18 BRPM | DIASTOLIC BLOOD PRESSURE: 68 MMHG | HEART RATE: 67 BPM | OXYGEN SATURATION: 92 %

## 2023-11-28 PROCEDURE — G0157 HHC PT ASSISTANT EA 15: HCPCS

## 2023-11-28 ASSESSMENT — ENCOUNTER SYMPTOMS: PAIN LOCATION - PAIN QUALITY: SHARP

## 2023-11-28 NOTE — HOME HEALTH
SUBJECTIVE: Patient reported feeling elevated pain from his L leg #8/10 this morning. He feels that the bandage is wrapped too tight on L LE. CAREGIVER INVOLVEMENT/ASSISTANCE NEEDED FOR: Patient's daughter, Kendal Ventura, assists with transportation, ADLS, and IADLS. HOME HEALTH SUPPLIES BY TYPE AND QUANTITY ORDERED/DELIVERED THIS VISIT INCLUDE: None needed for this visit  OBJECTIVE:  See interventions. PATIENT RESPONSE TO TREATMENT: Patient did not report any increase in pain after walking outside and performing therapeutic exercises. PATIENT LEVEL OF UNDERSTANDING OF EDUCATION PROVIDED: Patient verbalized understanding on fall prevention, non-pharmacological pain management techniques, and sign/symptoms of infection. ASSESSMENT OF PROGRESS TOWARD GOALS: Patient addressed goals for gait, transfers, and strength this visit. Pt continues to need Close S to ambulate safely but progressed to walking longer distance outside over uneven surfaces for gait training today using Rollator walker. Patient ambulated with decreased cheryl, decreased foot clearance, and forward flexed trunk. Gave multiple vc's for pt to lift B foot higher to clear floor safely and to maintain erect posture. Pt needs reinforcement for safety with gait and to improve posture. Pt also performed car transfer Mod I today. Pt demonstrated good technique and ability to get in and out of front passenger seat of her granddaughter's VW Passat with no assistance. In addition, pt performed seated therapeutic exercises but needed extended rest break between each exercise due to feeling tired. Reinforced compliance with HEP. CONTINUED NEED FOR THE FOLLOWING SKILLS: Gait Training, Stairs Training, Transfer Training, [employer], Balance Training, and Therapeutic Exercises.   PLAN FOR NEXT VISIT: Patient will be seen 1w1 2w1 1w1 to increase safety with gait, to increase safety with stairs, to improve transfer technique, to improve bed mobility

## 2023-11-30 ENCOUNTER — HOME CARE VISIT (OUTPATIENT)
Age: 78
End: 2023-11-30
Payer: MEDICARE

## 2023-11-30 VITALS
RESPIRATION RATE: 18 BRPM | OXYGEN SATURATION: 98 % | DIASTOLIC BLOOD PRESSURE: 72 MMHG | SYSTOLIC BLOOD PRESSURE: 120 MMHG | TEMPERATURE: 98 F | HEART RATE: 62 BPM

## 2023-11-30 VITALS — TEMPERATURE: 98.4 F | SYSTOLIC BLOOD PRESSURE: 131 MMHG | HEART RATE: 54 BPM | DIASTOLIC BLOOD PRESSURE: 75 MMHG

## 2023-11-30 PROCEDURE — G0157 HHC PT ASSISTANT EA 15: HCPCS

## 2023-11-30 PROCEDURE — G0158 HHC OT ASSISTANT EA 15: HCPCS

## 2023-11-30 PROCEDURE — G0300 HHS/HOSPICE OF LPN EA 15 MIN: HCPCS

## 2023-11-30 NOTE — HOME HEALTH
Skilled Needs: Education and Wound Care   Caregiver involvement: Pt independent with care with the exception of wound treatment due to location   Medications reviewed and all medications are available in the home this visit. The following education was provided regarding medications:  JUANJO BIRD notified of any discrepancies/look a-like medications/medication interactions: JUANJO  Medications are effecrtive at this time. Home health supplies by type and quantity ordered/delivered this visit include:  Supplies available   Patient education provided this visit:  Reviewed to reported any redness, swelling, warmth, fever, chills, increased heart/respiratory rate, uncontrolled pain/tenderness, drainage:green/yellow/brown, or odor to MD immediately. PT continues to go to dialysis 3x a week. Wound care completed as ordered with s/s of improvement as evident in decrease in size. Pt has no s/s of infection this visit.    Sharps education provided: JUANJO  Patient level of understanding of education provided: Rosemarie Niño all understanding to above education  Skilled Care Performed this visit: Education and Wound Care  Patient response to procedure performed: Minimal pain during dressing change   Agency Progress toward goals: Progressing toward interventions above  Patient's Progress towards personal goals: when patient reaches goals and medication is managed, and disease processes are understood patient agrees and understand that discharge will take place

## 2023-12-01 VITALS
DIASTOLIC BLOOD PRESSURE: 65 MMHG | SYSTOLIC BLOOD PRESSURE: 118 MMHG | OXYGEN SATURATION: 94 % | RESPIRATION RATE: 18 BRPM | HEART RATE: 59 BPM | TEMPERATURE: 98 F

## 2023-12-01 ASSESSMENT — ENCOUNTER SYMPTOMS: PAIN LOCATION - PAIN QUALITY: SHARP

## 2023-12-01 NOTE — HOME HEALTH
with gait, to increase safety with stairs, to improve transfer technique, to improve bed mobility technique, to improve balance, and to increase strength of B LE. DISCHARGE PLANNING DISCUSSED WITH PATIENT/CAREGIVER: Discharge patient to family with MD supervision when all goals are met. Pt/Caregiver did verbalize understanding of discharge planning.

## 2023-12-01 NOTE — HOME HEALTH
recalling energy conservation strategies when getting ready in the AM.  . PLAN FOR NEXT VISIT: Focus on standing balance/tolerance, HEP and lower body dressing . THE FOLLOWING DISCHARGE PLANNING WAS DISCUSSED WITH THE PATIENT/CAREGIVER: Discharge the patient to self when all goals are met or maximum potential has been reached. There are 2 visits remaining.

## 2023-12-04 ENCOUNTER — HOME CARE VISIT (OUTPATIENT)
Age: 78
End: 2023-12-04
Payer: MEDICARE

## 2023-12-04 VITALS
SYSTOLIC BLOOD PRESSURE: 126 MMHG | RESPIRATION RATE: 16 BRPM | OXYGEN SATURATION: 95 % | TEMPERATURE: 98.6 F | DIASTOLIC BLOOD PRESSURE: 74 MMHG | HEART RATE: 87 BPM

## 2023-12-04 PROCEDURE — G0300 HHS/HOSPICE OF LPN EA 15 MIN: HCPCS

## 2023-12-05 ENCOUNTER — HOME CARE VISIT (OUTPATIENT)
Age: 78
End: 2023-12-05
Payer: MEDICARE

## 2023-12-05 VITALS
TEMPERATURE: 97.8 F | HEART RATE: 56 BPM | DIASTOLIC BLOOD PRESSURE: 72 MMHG | RESPIRATION RATE: 16 BRPM | OXYGEN SATURATION: 93 % | SYSTOLIC BLOOD PRESSURE: 135 MMHG

## 2023-12-05 PROCEDURE — G0157 HHC PT ASSISTANT EA 15: HCPCS

## 2023-12-05 PROCEDURE — G0158 HHC OT ASSISTANT EA 15: HCPCS

## 2023-12-05 ASSESSMENT — ENCOUNTER SYMPTOMS: PAIN LOCATION - PAIN QUALITY: SHARP

## 2023-12-05 NOTE — HOME HEALTH
SUBJECTIVE: Patient reported feeling chronic pain from his L leg #8/10 this morning. His pain increases with activity and weight bearing. CAREGIVER INVOLVEMENT/ASSISTANCE NEEDED FOR: Patient's daughter, Felix Liu, assists with transportation, ADLS, and IADLS. HOME HEALTH SUPPLIES BY TYPE AND QUANTITY ORDERED/DELIVERED THIS VISIT INCLUDE: None needed for this visit  OBJECTIVE:  See interventions. PATIENT RESPONSE TO TREATMENT: Patient did not report any increase in pain after walking inside his home, performing transfers, and performing seated therapeutic exercises. PATIENT LEVEL OF UNDERSTANDING OF EDUCATION PROVIDED: Patient verbalized understanding on fall prevention, non-pharmacological pain management techniques, and sign/symptoms of infection but has difficulty repeating back teaching. ASSESSMENT OF PROGRESS TOWARD GOALS: Patient addressed goals for gait, transfers, and strength this visit. Pt continues to need Supervision to ambulate safely for gait training today inside his home using Rollator walker. Patient ambulated with decreased cheryl, decreased foot clearance, and forward flexed trunk. Gave multiple vc's for pt to lift B foot higher to clear floor safely and to maintain erect posture. Pt needs reinforcement for safety with gait and to improve posture. Pt also previously needed Supervision to perform sit < > stand transfers but progressed to Mod I for transfer training today. Pt demonstrated good technique and improved ability to perform transfers independently from his bed and Rollator walker. In addition, pt performed seated therapeutic exercises with multiple rest breaks and verbal cues with demonstration for proper technique. Reinforced compliance with HEP. Discussed with patient/CG plan to discharge pt from [facility] PT services next week. Pt/CG verbalized understanding and is in agreement with discharge plan.   CONTINUED NEED FOR THE FOLLOWING SKILLS: Gait Training, Stairs Training, Transfer

## 2023-12-05 NOTE — HOME HEALTH
Skilled Needs: Education and Wound Care   Caregiver involvement: Pt independent with care with the exception of wound treatment due to location   Medications reviewed and all medications are available in the home this visit. The following education was provided regarding medications:  JUANJO BIRD notified of any discrepancies/look a-like medications/medication interactions: JUANJO  Medications are effecrtive at this time. Home health supplies by type and quantity ordered/delivered this visit include:  Supplies available   Patient education provided this visit:  Reviewed to reported any redness, swelling, warmth, fever, chills, increased heart/respiratory rate, uncontrolled pain/tenderness, drainage:green/yellow/brown, or odor to MD immediately. Pt continues to go to Dialysis with no complications at this time. Wound care completed as ordered Pt due to see Vascular on thursday of this week and the wound clinic next thursday. Pt has supplies at this time. no change in medication at this time.   Danis education provided: JUANJO  Patient level of understanding of education provided: Crisma Rides all understanding to above education  Skilled Care Performed this visit: Education and Wound Care  Patient response to procedure performed: Minimal pain during dressing change   Agency Progress toward goals: Progressing toward interventions above  Patient's Progress towards personal goals: when patient reaches goals and medication is managed, and disease processes are understood patient agrees and understand that discharge will take place

## 2023-12-06 VITALS — HEART RATE: 53 BPM | DIASTOLIC BLOOD PRESSURE: 87 MMHG | TEMPERATURE: 98.2 F | SYSTOLIC BLOOD PRESSURE: 135 MMHG

## 2023-12-06 ASSESSMENT — ENCOUNTER SYMPTOMS: PAIN LOCATION - PAIN QUALITY: ACHY

## 2023-12-07 ENCOUNTER — OFFICE VISIT (OUTPATIENT)
Age: 78
End: 2023-12-07
Payer: MEDICARE

## 2023-12-07 ENCOUNTER — HOME CARE VISIT (OUTPATIENT)
Age: 78
End: 2023-12-07
Payer: MEDICARE

## 2023-12-07 VITALS
BODY MASS INDEX: 21.56 KG/M2 | RESPIRATION RATE: 16 BRPM | WEIGHT: 168 LBS | HEIGHT: 74 IN | DIASTOLIC BLOOD PRESSURE: 64 MMHG | SYSTOLIC BLOOD PRESSURE: 128 MMHG

## 2023-12-07 DIAGNOSIS — L97.902 VENOUS ULCER WITH FAT LAYER EXPOSED (HCC): Primary | ICD-10-CM

## 2023-12-07 DIAGNOSIS — I83.009 VENOUS ULCER WITH FAT LAYER EXPOSED (HCC): Primary | ICD-10-CM

## 2023-12-07 PROCEDURE — 1123F ACP DISCUSS/DSCN MKR DOCD: CPT | Performed by: STUDENT IN AN ORGANIZED HEALTH CARE EDUCATION/TRAINING PROGRAM

## 2023-12-07 PROCEDURE — 3074F SYST BP LT 130 MM HG: CPT | Performed by: STUDENT IN AN ORGANIZED HEALTH CARE EDUCATION/TRAINING PROGRAM

## 2023-12-07 PROCEDURE — G8427 DOCREV CUR MEDS BY ELIG CLIN: HCPCS | Performed by: STUDENT IN AN ORGANIZED HEALTH CARE EDUCATION/TRAINING PROGRAM

## 2023-12-07 PROCEDURE — 99203 OFFICE O/P NEW LOW 30 MIN: CPT | Performed by: STUDENT IN AN ORGANIZED HEALTH CARE EDUCATION/TRAINING PROGRAM

## 2023-12-07 PROCEDURE — 1036F TOBACCO NON-USER: CPT | Performed by: STUDENT IN AN ORGANIZED HEALTH CARE EDUCATION/TRAINING PROGRAM

## 2023-12-07 PROCEDURE — G8484 FLU IMMUNIZE NO ADMIN: HCPCS | Performed by: STUDENT IN AN ORGANIZED HEALTH CARE EDUCATION/TRAINING PROGRAM

## 2023-12-07 PROCEDURE — G8420 CALC BMI NORM PARAMETERS: HCPCS | Performed by: STUDENT IN AN ORGANIZED HEALTH CARE EDUCATION/TRAINING PROGRAM

## 2023-12-07 PROCEDURE — 3078F DIAST BP <80 MM HG: CPT | Performed by: STUDENT IN AN ORGANIZED HEALTH CARE EDUCATION/TRAINING PROGRAM

## 2023-12-08 ENCOUNTER — HOME CARE VISIT (OUTPATIENT)
Age: 78
End: 2023-12-08
Payer: MEDICARE

## 2023-12-08 VITALS
RESPIRATION RATE: 16 BRPM | TEMPERATURE: 97.6 F | SYSTOLIC BLOOD PRESSURE: 125 MMHG | OXYGEN SATURATION: 98 % | DIASTOLIC BLOOD PRESSURE: 75 MMHG | HEART RATE: 86 BPM

## 2023-12-08 PROCEDURE — G0157 HHC PT ASSISTANT EA 15: HCPCS

## 2023-12-10 ASSESSMENT — ENCOUNTER SYMPTOMS: PAIN LOCATION - PAIN QUALITY: SHARP

## 2023-12-11 RX ORDER — ALLOPURINOL 100 MG/1
100 TABLET ORAL DAILY
COMMUNITY

## 2023-12-11 NOTE — PROGRESS NOTES
Instructions for your surgery at [address] [place] [address] Date:  12/11/2023      Patient's Name:  Marco Whitney Sr           Surgery Date:  12/15/2023              Please enter the main entrance of the hospital and check-[facility] at the  [facility] Once checked in at the , you will take the elevators to the second floor, and report to the waiting room on the left. The room will say Procedure Registration. Do NOT eat or drink anything, including candy, gum, or ice chips after midnight prior to your surgery, unless you have specific instructions from your surgeon or anesthesia provider to do so. Brush your teeth before coming to the hospital. You may swish with water, but do not swallow. No smoking/Vaping/E-Cigarettes 24 hours prior to the day of surgery. No alcohol 24 hours prior to the day of surgery. No recreational drugs for one week prior to the day of surgery. Bring Photo ID, Insurance information, and Co-pay if required on day of surgery. Bring in pertinent legal documents, such as, Medical Power of NONA MYNOR, DNR, Advance Directive, etc.  Leave all valuables, including money/purse, at home. Remove all jewelry, including ALL body piercings, nail polish, acrylic nails, and makeup (including mascara); no lotions, powders, deodorant, or perfume/cologne/after shave on the skin. Follow instruction for Hibiclens washes and CHG wipes from surgeon's office. Glasses and dentures may be worn to the hospital. They must be removed prior to surgery. Please bring case/container for glasses or dentures. Contact lenses should not be worn on day of surgery. Call your doctor's office if symptoms of a cold or illness develop within 24-48 hours prior to your surgery. Call your doctor's office if you have any questions concerning insurance or co-pays. 15. AN ADULT (relative or friend 25 years or older) [address]   16. Please make

## 2023-12-12 ENCOUNTER — HOME CARE VISIT (OUTPATIENT)
Age: 78
End: 2023-12-12
Payer: MEDICARE

## 2023-12-12 VITALS
RESPIRATION RATE: 17 BRPM | TEMPERATURE: 97.6 F | HEART RATE: 68 BPM | DIASTOLIC BLOOD PRESSURE: 70 MMHG | OXYGEN SATURATION: 100 % | SYSTOLIC BLOOD PRESSURE: 110 MMHG

## 2023-12-12 PROCEDURE — G0151 HHCP-SERV OF PT,EA 15 MIN: HCPCS

## 2023-12-12 PROCEDURE — G0152 HHCP-SERV OF OT,EA 15 MIN: HCPCS

## 2023-12-12 NOTE — HOME HEALTH
Caregiver involvement: Pt's daughter is home during the day and provides daily S.    Medications reviewed and all medications are available in the home this visit. The following education was provided regarding medications, medication interactions, and look alike medications (specify): Continue as directed by MD.    Medications  are effective at this time. Patient education provided this visit: reviewed energy conservation with pt during ADL tasks    Sharps education provided:  na    Patient level of understanding of education provided: pt reports compliance and is sitting during functional tasks. Skilled Care Performed this visit: OT reassessment of ADL, transfers and DC    Patient response to procedure performed:  Mr. Anant Bhagat had a positive response to therapy. He denies having pain at rest and able to ambulate with S despite pain in his foot during ambulation. Vitals were within therapy parameters    Patient's Progress towards personal goals: all goals met    Home exercise program: B UE strengthening against gravity for shoulder flexion/extension, overhead press, chest press, ab/adduction and elbow flexion/extension    Continued need for the following skills: SN    Discharge Plans:  DC OT. Maximal potential has been achieved with self care and functional mobility in the home. No further OT indicated at this time. MD notified.

## 2023-12-13 ENCOUNTER — HOME CARE VISIT (OUTPATIENT)
Age: 78
End: 2023-12-13
Payer: MEDICARE

## 2023-12-13 VITALS
OXYGEN SATURATION: 96 % | SYSTOLIC BLOOD PRESSURE: 120 MMHG | DIASTOLIC BLOOD PRESSURE: 78 MMHG | TEMPERATURE: 97.8 F | HEART RATE: 52 BPM | RESPIRATION RATE: 16 BRPM

## 2023-12-13 NOTE — HOME HEALTH
S: patient reports he has been walking and doing his exercises \"most of the time\"    O: PAIN: burning pain reported under the promixal end of the tubigrip on the left leg - will notify nursing    WOUND: L lower leg wound per nursing noted - not observed due to dressing    ROM: B LE hip flexion, abduction and adduction WFL   B knee flexion, extension WFL    STRENGTH: R knee ext 4+/5, R knee flexion 4/5, R hip flexion 3+/5, R hip abd/adduction 4/5  L knee flexion 4+/5 and extension 4/5, L hip flexion 4-/5 abduction and adduction 4/5    POSTURE: patient maintains a significant kyphotic posture in the thorcic and cervical spine with inability to fully correct. he was instructed on standing as tall as he can tp prevent worsening of the kypohosis     BED MOBILITY: independent with bed mobility     EQUIPMENT: wheelchair, rollator, standard walker    TRANSFERS: mod I to/from bed with use of the walker for stability    GAIT: He is ambulating in the home with a rollator with mod I to supervision (due to increased clutter in the apartment) - his gait is characterized by full left step length and foot clearance, reduced right step length and foot clearance, and forward flexed posture. His rollator is too short for him but he prefers this to the front wheeled walker that he has. Continue to recommend supervision when walking outside due to safety and gait deviations increasing his fall risk. STEPS: NA patient is on the first floor apartment     BALANCE: tinetti 9/28 high fall risk at evaluation  tinetti 13/28 at discharge  greater than a 4 point increase in the tinetti score indicates a statistically significant reduction in fall risk    RESPONSE TO TREATMENT: patient demonstrated a positive outcome post treatment and reported increased comfort and increased confidence with transfers and mobility.  Patient reported good understanding of the HEP and reports confidence in ability to complete the program as prescribed by

## 2023-12-14 ENCOUNTER — ANESTHESIA EVENT (OUTPATIENT)
Dept: CARDIOTHORACIC SURGERY | Facility: HOSPITAL | Age: 78
End: 2023-12-14
Payer: MEDICARE

## 2023-12-14 NOTE — CASE COMMUNICATION
Occupational Therapy Discharge - Mr. Zacarias Gramajo was seen by OT for 4 weeks to maximize his safety and participation with self care and functional mobility in the home. Pt and family have opted against use of a raised toilet seat. Pt states that he is comfortable at this time with sit <> stand off the commode with use of counter top for balance. He was able to perform the toilet and bed transfer today with S. He is using his RW for all amb ulation and transfers. Mr. Zacarias Gramajo denies needing help from family for sponge bathing or dressing. He was able to demonstrate mock bathing at the bathroom sink today while seated on the toilet. He reports that he sometimes uses the rollator at the sink with a non skid mat under his feet. Family gathers his clothing and sets it out for pt. He is able to don/doff all clothing to include foot wear. Mr. Zacarias Gramajo has been instructed on energy  conservation strategies such as sitting during functional tasks, taking frequent rest breaks and keeping frequently used items within reach. He reports daily compliance and reached maximal potential with self care and functional transfers in his home. No further OT services are indicated at this time.

## 2023-12-15 ENCOUNTER — HOSPITAL ENCOUNTER (OUTPATIENT)
Facility: HOSPITAL | Age: 78
Setting detail: OUTPATIENT SURGERY
Discharge: HOME | End: 2023-12-15
Attending: STUDENT IN AN ORGANIZED HEALTH CARE EDUCATION/TRAINING PROGRAM | Admitting: STUDENT IN AN ORGANIZED HEALTH CARE EDUCATION/TRAINING PROGRAM
Payer: MEDICARE

## 2023-12-15 ENCOUNTER — ANESTHESIA (OUTPATIENT)
Dept: CARDIOTHORACIC SURGERY | Facility: HOSPITAL | Age: 78
End: 2023-12-15
Payer: MEDICARE

## 2023-12-15 ENCOUNTER — HOME CARE VISIT (OUTPATIENT)
Age: 78
End: 2023-12-15
Payer: MEDICARE

## 2023-12-15 VITALS
TEMPERATURE: 97.6 F | HEIGHT: 74 IN | SYSTOLIC BLOOD PRESSURE: 145 MMHG | BODY MASS INDEX: 21.3 KG/M2 | RESPIRATION RATE: 18 BRPM | OXYGEN SATURATION: 100 % | WEIGHT: 166 LBS | HEART RATE: 57 BPM | DIASTOLIC BLOOD PRESSURE: 85 MMHG

## 2023-12-15 LAB
ANION GAP SERPL CALC-SCNC: 8 MMOL/L (ref 3–18)
BUN SERPL-MCNC: 60 MG/DL (ref 7–18)
BUN/CREAT SERPL: 7 (ref 12–20)
CALCIUM SERPL-MCNC: 10.2 MG/DL (ref 8.5–10.1)
CHLORIDE SERPL-SCNC: 100 MMOL/L (ref 100–111)
CO2 SERPL-SCNC: 28 MMOL/L (ref 21–32)
CREAT SERPL-MCNC: 8.82 MG/DL (ref 0.6–1.3)
GLUCOSE SERPL-MCNC: 94 MG/DL (ref 74–99)
POTASSIUM SERPL-SCNC: 5.2 MMOL/L (ref 3.5–5.5)
SODIUM SERPL-SCNC: 136 MMOL/L (ref 136–145)

## 2023-12-15 PROCEDURE — 2500000003 HC RX 250 WO HCPCS: Performed by: ANESTHESIOLOGY

## 2023-12-15 PROCEDURE — 6370000000 HC RX 637 (ALT 250 FOR IP): Performed by: STUDENT IN AN ORGANIZED HEALTH CARE EDUCATION/TRAINING PROGRAM

## 2023-12-15 PROCEDURE — 2580000003 HC RX 258: Performed by: NURSE ANESTHETIST, CERTIFIED REGISTERED

## 2023-12-15 PROCEDURE — 6360000002 HC RX W HCPCS: Performed by: ANESTHESIOLOGY

## 2023-12-15 PROCEDURE — 2580000003 HC RX 258: Performed by: ANESTHESIOLOGY

## 2023-12-15 PROCEDURE — 80048 BASIC METABOLIC PNL TOTAL CA: CPT

## 2023-12-15 RX ORDER — FENTANYL CITRATE 50 UG/ML
50 INJECTION, SOLUTION INTRAMUSCULAR; INTRAVENOUS EVERY 5 MIN PRN
Status: DISCONTINUED | OUTPATIENT
Start: 2023-12-15 | End: 2023-12-15 | Stop reason: HOSPADM

## 2023-12-15 RX ORDER — ACETAMINOPHEN 325 MG/1
650 TABLET ORAL EVERY 4 HOURS PRN
Status: DISCONTINUED | OUTPATIENT
Start: 2023-12-15 | End: 2023-12-15 | Stop reason: HOSPADM

## 2023-12-15 RX ORDER — SODIUM CHLORIDE 9 MG/ML
INJECTION, SOLUTION INTRAVENOUS CONTINUOUS PRN
Status: DISCONTINUED | OUTPATIENT
Start: 2023-12-15 | End: 2023-12-15 | Stop reason: SDUPTHER

## 2023-12-15 RX ORDER — MIDAZOLAM HYDROCHLORIDE 1 MG/ML
INJECTION INTRAMUSCULAR; INTRAVENOUS PRN
Status: DISCONTINUED | OUTPATIENT
Start: 2023-12-15 | End: 2023-12-15 | Stop reason: SDUPTHER

## 2023-12-15 RX ORDER — SODIUM CHLORIDE 0.9 % (FLUSH) 0.9 %
5-40 SYRINGE (ML) INJECTION PRN
Status: DISCONTINUED | OUTPATIENT
Start: 2023-12-15 | End: 2023-12-15 | Stop reason: HOSPADM

## 2023-12-15 RX ORDER — SODIUM CHLORIDE 0.9 % (FLUSH) 0.9 %
5-40 SYRINGE (ML) INJECTION EVERY 12 HOURS SCHEDULED
Status: DISCONTINUED | OUTPATIENT
Start: 2023-12-15 | End: 2023-12-15 | Stop reason: HOSPADM

## 2023-12-15 RX ORDER — SODIUM CHLORIDE 9 MG/ML
INJECTION, SOLUTION INTRAVENOUS PRN
Status: DISCONTINUED | OUTPATIENT
Start: 2023-12-15 | End: 2023-12-15 | Stop reason: HOSPADM

## 2023-12-15 RX ORDER — PROPOFOL 10 MG/ML
INJECTION, EMULSION INTRAVENOUS PRN
Status: DISCONTINUED | OUTPATIENT
Start: 2023-12-15 | End: 2023-12-15 | Stop reason: SDUPTHER

## 2023-12-15 RX ORDER — LIDOCAINE HYDROCHLORIDE 10 MG/ML
1 INJECTION, SOLUTION EPIDURAL; INFILTRATION; INTRACAUDAL; PERINEURAL
Status: DISCONTINUED | OUTPATIENT
Start: 2023-12-15 | End: 2023-12-15 | Stop reason: HOSPADM

## 2023-12-15 RX ORDER — FAMOTIDINE 20 MG/1
20 TABLET, FILM COATED ORAL ONCE
Status: DISCONTINUED | OUTPATIENT
Start: 2023-12-15 | End: 2023-12-15 | Stop reason: HOSPADM

## 2023-12-15 RX ORDER — LIDOCAINE HYDROCHLORIDE 20 MG/ML
INJECTION, SOLUTION EPIDURAL; INFILTRATION; INTRACAUDAL; PERINEURAL PRN
Status: DISCONTINUED | OUTPATIENT
Start: 2023-12-15 | End: 2023-12-15 | Stop reason: SDUPTHER

## 2023-12-15 RX ADMIN — LIDOCAINE HYDROCHLORIDE 50 MG: 20 INJECTION, SOLUTION EPIDURAL; INFILTRATION; INTRACAUDAL; PERINEURAL at 10:45

## 2023-12-15 RX ADMIN — SODIUM CHLORIDE: 9 INJECTION, SOLUTION INTRAVENOUS at 09:21

## 2023-12-15 RX ADMIN — MIDAZOLAM 1 MG: 1 INJECTION, SOLUTION INTRAMUSCULAR; INTRAVENOUS at 10:32

## 2023-12-15 RX ADMIN — MIDAZOLAM 1 MG: 1 INJECTION, SOLUTION INTRAMUSCULAR; INTRAVENOUS at 10:39

## 2023-12-15 RX ADMIN — PROPOFOL 50 MG: 10 INJECTION, EMULSION INTRAVENOUS at 10:44

## 2023-12-15 RX ADMIN — SODIUM CHLORIDE: 9 INJECTION, SOLUTION INTRAVENOUS at 10:05

## 2023-12-15 RX ADMIN — ACETAMINOPHEN 325MG 650 MG: 325 TABLET ORAL at 14:15

## 2023-12-15 ASSESSMENT — PAIN DESCRIPTION - LOCATION: LOCATION: LEG

## 2023-12-15 ASSESSMENT — PAIN DESCRIPTION - ORIENTATION: ORIENTATION: LEFT

## 2023-12-15 ASSESSMENT — PAIN SCALES - GENERAL
PAINLEVEL_OUTOF10: 9
PAINLEVEL_OUTOF10: 0

## 2023-12-15 ASSESSMENT — PAIN DESCRIPTION - DESCRIPTORS: DESCRIPTORS: ACHING;ITCHING

## 2023-12-15 ASSESSMENT — PAIN - FUNCTIONAL ASSESSMENT: PAIN_FUNCTIONAL_ASSESSMENT: ACTIVITIES ARE NOT PREVENTED

## 2023-12-15 NOTE — PERIOP NOTE
Patient is cleared to go to phase 2 recovery for discharge home per Dr. Tiffany Chiang. Patient is sleeping easily arousable and oriented to person and post -op status.

## 2023-12-15 NOTE — ANESTHESIA POSTPROCEDURE EVALUATION
Department of Anesthesiology  Postprocedure Note    Patient: Andressa Melendrez  MRN: [id number]  YOB: 1945  Date of evaluation: 12/15/2023    Procedure Summary       Date: 12/15/23 Room / Location: [facility] CVT 02 / [facility] CARDIAC SURGERY    Anesthesia Start: 1030 Anesthesia Stop: 1122    Procedure: LEFT LEG WOUND DEBRIDEMENT WITH SKIN GRAFT APPLICATION WITH KERECIS (Left) Diagnosis:       Venous ulcer with fat layer exposed ([address])      (Venous ulcer with fat layer exposed ([address]) [I83.009, L97.902])    Surgeons: Nella Bailey MD Responsible Provider: Nadia Whelan MD    Anesthesia Type: MAC ASA Status: 3            Anesthesia Type: MAC    Harry Phase I: Harry Score: 9    Harry Phase II: Harry Score: 10    Anesthesia Post Evaluation    Patient location during evaluation: PACU  Patient participation: complete - patient participated  Level of consciousness: sleepy but conscious  Pain score: 0  Airway patency: patent  Nausea & Vomiting: no nausea and no vomiting  Cardiovascular status: blood pressure returned to baseline  Respiratory status: acceptable  Hydration status: euvolemic  Pain management: adequate    No notable events documented.

## 2023-12-15 NOTE — PERIOP NOTE
Spoke with Cait Plaza RN, And  informed no antibiotic order, no procedure consent order, Daughter at bedside and will sign consents

## 2023-12-15 NOTE — H&P
Vascular Surgery      Patient: Luis Alberto White MRN: [id number]  CSN: [id number]      YOB: 1945    Age: 78 y.o. Sex: male      DOA: 12/15/2023       HPI:     12/7/23  Mr. Adriana Ramirez is a 77yo M with history noted below on HD MWF with history of DVT in 1965 and venous insufficiency since. In August he hit the left posterior leg on a door and since has an open wound with venous stasis. Dopplerable non palpable pedal signals, minimal edema     Plan for OR debridement and fish skin placement next week  RADHA   Venous reflux bilateral  Continue coumadin, do not hold. 12/15/23  Plan for left leg debridement and fish skin placement    Past Medical History:   Diagnosis Date    Atrial fibrillation ([address])     ESRD (end stage renal disease) ([address])     Hemodialysis patient ([address])     Hx of blood clots     Hypertension        Past Surgical History:   Procedure Laterality Date    EYE SURGERY      VASCULAR SURGERY         Family History   Problem Relation Age of Onset    Lung Cancer Mother        Social History     Socioeconomic History    Marital status:      Spouse name: None    Number of children: None    Years of education: None    Highest education level: None   Tobacco Use    Smoking status: Never     Passive exposure: Never    Smokeless tobacco: Never   Vaping Use    Vaping Use: Never used   Substance and Sexual Activity    Alcohol use: Never    Drug use: Never     Social Determinants of Health     Transportation Needs: No Transportation Needs (11/11/2023)    OASIS : Transportation     Lack of Transportation (Medical): No     Lack of Transportation (Non-Medical): No     Patient Unable or Declines to Respond: No   Social Connections: Feeling Socially Integrated (11/11/2023)    OASIS : Social Isolation     Frequency of experiencing loneliness or isolation: Never       Prior to Admission medications    Medication Sig Start Date End Date Taking?  Authorizing Provider   allopurinol (ZYLOPRIM) 100 MG

## 2023-12-15 NOTE — OP NOTE
Operative Note      Patient: Andriy Ramon Sr  YOB: 1945  MRN: [id number]    Date of Procedure: 12/15/2023    Pre-Op Diagnosis Codes: * Venous ulcer with fat layer exposed ([address]) [I83.009, L97.902]    Post-Op Diagnosis: Same       Procedure: Left posterior leg fish skin application Kerecis 9V82 x 2    Surgeon(s):  Kehinde Galarza MD    Assistant:   Surgical Assistant: Orion LOPEZ    Anesthesia: Monitor Anesthesia Care    Estimated Blood Loss (mL): Minimal    Complications: None    Specimens:   * No specimens in log *    Implants:  * No implants in log *      Drains: * No LDAs found *    Findings:   Uncomplicated graft placement  13cm x 5cm x 1mm wound     Detailed Description of Procedure:   Patient was brought to the operating room placed in supine position, informed consent had been obtained. Time out was performed. Left leg was prepped and draped in the usual sterile manner. The wound was measure and no evidence of infection of areas requiring debridement. Next, I cut to size two 7x10 fish skin grafts and applied to the wound and secured in place with steri-strips. Next, dressings were performed with adaptic, gauze, ABD, kerlix and coban wrap. Patient tolerated the procedure well.      Kehinde Galarza MD      Electronically signed by Kehinde Galarza MD on 12/15/2023 at 11:16 AM

## 2023-12-15 NOTE — ANESTHESIA PRE PROCEDURE
Department of Anesthesiology  Preprocedure Note       Name:  Soren Zavala Sr   Age:  78 y.o.  :  1945                                          MRN:  [id number]         Date:  12/15/2023      Surgeon: Srinivas Ca):  Rand Law MD    Procedure: Procedure(s):  LEFT LEG WOUND DEBRIDEMENT WITH SKIN GRAFT APPLICATION WITH KERECIS    Medications prior to admission:   Prior to Admission medications    Medication Sig Start Date End Date Taking?  Authorizing Provider   allopurinol (ZYLOPRIM) 100 MG tablet Take 1 tablet by mouth daily  Patient not taking: Reported on 12/15/2023    Yasmine Patel MD   apixaban (ELIQUIS) 5 MG TABS tablet Take 1 tablet by mouth 2 times daily 23   Raj Macedo DO   calcitRIOL (ROCALTROL) 0.5 MCG capsule Take 1 capsule by mouth three times a week  Patient not taking: Reported on 12/15/2023    Yasmine Patel MD   calcium acetate (PHOSLO) 667 MG TABS Take 2 tablets by mouth See Admin Instructions    Yasmine Patel MD   cinacalcet (SENSIPAR) 30 MG tablet Take 1 tablet by mouth three times a week  Patient not taking: Reported on 12/15/2023    Yasmine Patel MD   meclizine (ANTIVERT) 25 MG tablet Take 1 tablet by mouth 3 times daily as needed    Yasmine Patel MD   acetaminophen (TYLENOL 8 HOUR ARTHRITIS PAIN) 650 MG extended release tablet Take 1 tablet by mouth every 8 hours as needed for Pain 23   Raj Macedo DO   amLODIPine (NORVASC) 10 MG tablet Take 1 tablet by mouth daily 23   Raj Macedo DO   promethazine (PHENERGAN) 12.5 MG tablet Take 1 tablet by mouth every 6 hours as needed for Nausea  Patient not taking: Reported on 12/15/2023 11/9/23   Raj Macedo DO   sucroferric oxyhydroxide (VELPHORO) 500 MG CHEW chewable tablet Take 1 tablet by mouth 3 times daily (with meals)  Patient not taking: Reported on 12/15/2023    Yasmine Patel MD       Current medications:    Current Facility-Administered Medications   Medication Dose

## 2023-12-15 NOTE — DISCHARGE INSTRUCTIONS
Per Dr. Vargas Going,    Keep dressing clean, dry, and intact. Do NOT get the dressing wet. Keep left leg elevated. You may put weight on your left lower extremity. Put as much weight that you can tolerate. Call Dr. Chinedu Alba office if you have any questions or concerns. DISCHARGE SUMMARY from Nurse    PATIENT INSTRUCTIONS:    After general anesthesia or intravenous sedation, for 24 hours or while taking prescription Narcotics:  Limit your activities  Do not drive and operate hazardous machinery  Do not make important personal or business decisions  Do  not drink alcoholic beverages  If you have not urinated within 8 hours after discharge, please contact your surgeon on call. Report the following to your surgeon:  Excessive pain, swelling, redness or odor of or around the surgical area  Temperature over 100.5  Nausea and vomiting lasting longer than 4 hours or if unable to take medications  Any signs of decreased circulation or nerve impairment to extremity: change in color, persistent  numbness, tingling, coldness or increase pain  Any questions        These are general instructions for a healthy lifestyle:    No smoking/ No tobacco products/ Avoid exposure to second hand smoke  Surgeon General's Warning:  Quitting smoking now greatly reduces serious risk to your health. Obesity, smoking, and sedentary lifestyle greatly increases your risk for illness    A healthy diet, regular physical exercise & weight monitoring are important for maintaining a healthy lifestyle    You may be retaining fluid if you have a history of heart failure or if you experience any of the following symptoms:  Weight gain of 3 pounds or more overnight or 5 pounds in a week, increased swelling in our hands or feet or shortness of breath while lying flat in bed. Please call your doctor as soon as you notice any of these symptoms; do not wait until your next office visit.         The discharge information has been reviewed with the patient. The patient verbalized understanding. Discharge medications reviewed with the patient and appropriate educational materials and side effects teaching were provided.   ___________________________________________________________________________________________________________________________________

## 2023-12-18 ENCOUNTER — TELEPHONE (OUTPATIENT)
Facility: CLINIC | Age: 78
End: 2023-12-18

## 2023-12-28 ENCOUNTER — HOME CARE VISIT (OUTPATIENT)
Age: 78
End: 2023-12-28
Payer: MEDICARE

## 2023-12-28 VITALS
DIASTOLIC BLOOD PRESSURE: 72 MMHG | TEMPERATURE: 98 F | RESPIRATION RATE: 16 BRPM | SYSTOLIC BLOOD PRESSURE: 126 MMHG | HEART RATE: 87 BPM | OXYGEN SATURATION: 98 %

## 2023-12-28 PROCEDURE — G0300 HHS/HOSPICE OF LPN EA 15 MIN: HCPCS

## 2023-12-29 NOTE — HOME HEALTH
Skilled Needs: Education and Wound Care   Caregiver involvement: Pt independent with care with the exception of wound treatment due to location   Medications reviewed and all medications are available in the home this visit. The following education was provided regarding medications:  JUANJO BIRD notified of any discrepancies/look a-like medications/medication interactions: JUANJO  Medications are effecrtive at this time. Home health supplies by type and quantity ordered/delivered this visit include:  Supplies available   Patient education provided this visit:  Reviewed to reported any redness, swelling, warmth, fever, chills, increased heart/respiratory rate, uncontrolled pain/tenderness, drainage:green/yellow/brown, or odor to MD immediately. Nurse unwrapped wound as ordered and rewrapped. Pt is due to see MD again on thursday for another skin graft. Pt has supplies at this time. Pt continues to go to dialysis 3x a week No pain verbalized at this time.  No chnage in medications at this time   Sharps education provided: JUANJO  Patient level of understanding of education provided: Quynh Montgomery all understanding to above education  Skilled Care Performed this visit: Education and Wound Care  Patient response to procedure performed: Minimal pain during dressing change   Agency Progress toward goals: Progressing toward interventions above  Patient's Progress towards personal goals: when patient reaches goals and medication is managed, and disease processes are understood patient agrees and understand that discharge will take place

## 2024-01-01 ENCOUNTER — HOME CARE VISIT (OUTPATIENT)
Age: 79
End: 2024-01-01
Payer: MEDICARE

## 2024-01-01 NOTE — HOME HEALTH
per md fax does not want pt see until he goed to the office on thursday for another skin graft/ missed visit completed

## 2024-01-02 RX ORDER — SUCROFERRIC OXYHYDROXIDE 500 MG/1
500 TABLET, CHEWABLE ORAL
Qty: 90 TABLET | Refills: 0 | Status: SHIPPED | OUTPATIENT
Start: 2024-01-02

## 2024-01-02 NOTE — TELEPHONE ENCOUNTER
Patients daughter is requesting refill on     ucroferric oxyhydroxide (VELPHORO) 500 MG CHEW chewable tablet     Future Appointments   Date Time Provider Department Center   1/4/2024 10:45 AM Serjio Anaya MD BS BS AMB   1/5/2024  3:00 AM Alma Lott LPN [facility] HR HOME HEAL   1/8/2024 To Be Determined Alma Lott LPN [facility] HR HOME HEAL   1/10/2024 To Be Determined Margie Smart, ORI [facility] HR HOME HEAL   2/13/2024  2:00 PM Guillermina Dumont DO [facility] BS AMB

## 2024-01-04 ENCOUNTER — OFFICE VISIT (OUTPATIENT)
Age: 79
End: 2024-01-04
Payer: MEDICARE

## 2024-01-04 VITALS
HEART RATE: 68 BPM | SYSTOLIC BLOOD PRESSURE: 110 MMHG | RESPIRATION RATE: 18 BRPM | WEIGHT: 166 LBS | DIASTOLIC BLOOD PRESSURE: 70 MMHG | OXYGEN SATURATION: 97 % | BODY MASS INDEX: 21.3 KG/M2 | HEIGHT: 74 IN

## 2024-01-04 DIAGNOSIS — L97.902 VENOUS ULCER WITH FAT LAYER EXPOSED (HCC): Primary | ICD-10-CM

## 2024-01-04 DIAGNOSIS — I83.009 VENOUS ULCER WITH FAT LAYER EXPOSED (HCC): Primary | ICD-10-CM

## 2024-01-04 PROCEDURE — 3074F SYST BP LT 130 MM HG: CPT | Performed by: STUDENT IN AN ORGANIZED HEALTH CARE EDUCATION/TRAINING PROGRAM

## 2024-01-04 PROCEDURE — 1123F ACP DISCUSS/DSCN MKR DOCD: CPT | Performed by: STUDENT IN AN ORGANIZED HEALTH CARE EDUCATION/TRAINING PROGRAM

## 2024-01-04 PROCEDURE — 99215 OFFICE O/P EST HI 40 MIN: CPT | Performed by: STUDENT IN AN ORGANIZED HEALTH CARE EDUCATION/TRAINING PROGRAM

## 2024-01-04 PROCEDURE — 3078F DIAST BP <80 MM HG: CPT | Performed by: STUDENT IN AN ORGANIZED HEALTH CARE EDUCATION/TRAINING PROGRAM

## 2024-01-04 ASSESSMENT — PATIENT HEALTH QUESTIONNAIRE - PHQ9
SUM OF ALL RESPONSES TO PHQ QUESTIONS 1-9: 0
1. LITTLE INTEREST OR PLEASURE IN DOING THINGS: 0
SUM OF ALL RESPONSES TO PHQ9 QUESTIONS 1 & 2: 0
SUM OF ALL RESPONSES TO PHQ QUESTIONS 1-9: 0
2. FEELING DOWN, DEPRESSED OR HOPELESS: 0

## 2024-01-04 NOTE — PROGRESS NOTES
[facility]  [address],  [address]  [facility] [address]  Dept: [phone]           Chart reviewed for the following:   Lissett BAPTISTE RN, have reviewed the medications and updated the Allergic reactions for Aniceto Interiano Sr     TIME OUT performed immediately prior to start of procedure:   Lissett BAPTISTE RN, have performed the following reviews on Aniceto Interiano Sr prior to the start of the procedure:            * Patient was identified by name and date of birth   * Agreement that logan boot removed and reapplied   * Procedure site verified   * Patient was positioned for comfort  * Verbal consent was given by patient     Time: 1130      Date of procedure: 1/4/2024    Procedure performed by:  Serjio Anaya MD    How tolerated by patient: tolerated well     Comments:  dressing removed , graft intact , pictures taken and placed in chart. Applied adaptic , abd , kerlex and coban. Sent order to  for 3 times per week and then see us in 2 weeks for possible graft application         
Problem List   Diagnosis    ESRD (end stage renal disease) ([facility])    Primary hypertension    History of DVT (deep vein thrombosis)    [place] (hard of hearing)    Chronic deep vein thrombosis (DVT) of proximal vein of right lower extremity ([facility])     Current Outpatient Medications   Medication Sig Dispense Refill    sucroferric oxyhydroxide (VELPHORO) 500 MG CHEW chewable tablet Take 1 tablet by mouth 3 times daily (with meals) 90 tablet 0    allopurinol (ZYLOPRIM) 100 MG tablet Take 1 tablet by mouth daily      apixaban (ELIQUIS) 5 MG TABS tablet Take 1 tablet by mouth 2 times daily 180 tablet 2    calcitRIOL (ROCALTROL) 0.5 MCG capsule Take 1 capsule by mouth three times a week      calcium acetate (PHOSLO) 667 MG TABS Take 2 tablets by mouth See Admin Instructions      cinacalcet (SENSIPAR) 30 MG tablet Take 1 tablet by mouth three times a week      meclizine (ANTIVERT) 25 MG tablet Take 1 tablet by mouth 3 times daily as needed      acetaminophen (TYLENOL 8 HOUR ARTHRITIS PAIN) 650 MG extended release tablet Take 1 tablet by mouth every 8 hours as needed for Pain 60 tablet 3    amLODIPine (NORVASC) 10 MG tablet Take 1 tablet by mouth daily 90 tablet 2    promethazine (PHENERGAN) 12.5 MG tablet Take 1 tablet by mouth every 6 hours as needed for Nausea 120 tablet 2     No current facility-administered medications for this visit.     Allergies   Allergen Reactions    Influenza Virus Vaccine Other (See Comments)     Got sick     Social History     Socioeconomic History    Marital status:      Spouse name: Not on file    Number of children: Not on file    Years of education: Not on file    Highest education level: Not on file   Occupational History    Not on file   Tobacco Use    Smoking status: Never     Passive exposure: Never    Smokeless tobacco: Never   Vaping Use    Vaping Use: Never used   Substance and Sexual Activity    Alcohol use: Never    Drug use: Never    Sexual activity: Not on file   Other Topics

## 2024-01-05 ENCOUNTER — HOME CARE VISIT (OUTPATIENT)
Age: 79
End: 2024-01-05
Payer: MEDICARE

## 2024-01-08 RX ORDER — SUCROFERRIC OXYHYDROXIDE 500 MG/1
TABLET, CHEWABLE ORAL
Qty: 90 TABLET | Refills: 0 | OUTPATIENT
Start: 2024-01-08

## 2024-01-09 ENCOUNTER — HOME CARE VISIT (OUTPATIENT)
Age: 79
End: 2024-01-09
Payer: MEDICARE

## 2024-01-10 ENCOUNTER — HOME CARE VISIT (OUTPATIENT)
Age: 79
End: 2024-01-10

## 2024-01-10 VITALS
HEART RATE: 57 BPM | OXYGEN SATURATION: 94 % | RESPIRATION RATE: 16 BRPM | TEMPERATURE: 98.7 F | DIASTOLIC BLOOD PRESSURE: 78 MMHG | SYSTOLIC BLOOD PRESSURE: 156 MMHG

## 2024-01-10 DIAGNOSIS — Z86.718 HISTORY OF DVT (DEEP VEIN THROMBOSIS): ICD-10-CM

## 2024-01-10 PROCEDURE — G0299 HHS/HOSPICE OF RN EA 15 MIN: HCPCS

## 2024-01-10 NOTE — TELEPHONE ENCOUNTER
Order for eliquis 5 mg take 1 tab bid #28/0 placed per verbal order from Dr. Agee.   Lot #:[id number]  Exp sep 30 2025

## 2024-01-11 NOTE — HOME HEALTH
Physician Notification and Justification to Continue Services:     Justification for continued intermittent care: patient with wound care concerns as follows slow wound healing due to commodities     Dr Anaya notified of the following: the need for continued Skilled Nursing home health services for above reasons, plan of care including visit frequency of 1 wk 8, 2 prn Skilled Nursing for : additional assessment and WOUND: wound care services to [facility] wound care    Caregiver involvement: daughter assist with ADLS/meal prep and transportation and medication management.    Medications reviewed and all medications are available in the home this visit.    The following education was provided regarding medications:  reviewed all medication bottles in home for frequency, side effects and precautions. verbalized understanding and repeat back  .      Medications are effective at this time.      Home health supplies by type and quantity ordered/delivered this visit include: wound care supplies in the home    Patient education provided this visit: see intervention tab.          Patient level of understanding of education provided: see intervention tab for level of understanding.      Patient response to procedure performed:  tolerated wound care well    Agency Progress toward goals: see intervention tab for progressing toward goals        Patient's Progress towards personal goals:see intervention tab for progressing toward goals        Home exercise program: cont to take all medications/diet as prescribed, follow pressure ulcer precautions, follow all fall precautions and use any assistive devices recommended by therapy or medical providers, reported any abnormal s/sx of infection to MD immediately. disease process teaching packets/ home care booklet for reference. call home care for any concerns/questions. keep all medical appts.      Continued need for the following skills: Nursing.    Plan for next visit: wound

## 2024-01-16 ENCOUNTER — HOME CARE VISIT (OUTPATIENT)
Age: 79
End: 2024-01-16

## 2024-01-16 VITALS
SYSTOLIC BLOOD PRESSURE: 118 MMHG | DIASTOLIC BLOOD PRESSURE: 68 MMHG | OXYGEN SATURATION: 98 % | RESPIRATION RATE: 16 BRPM | HEART RATE: 98 BPM | TEMPERATURE: 97 F

## 2024-01-16 PROCEDURE — G0300 HHS/HOSPICE OF LPN EA 15 MIN: HCPCS

## 2024-01-16 NOTE — HOME HEALTH
Skilled Needs: Education and Wound Care   Caregiver involvement: Pt independent with care with the exception of wound treatment due to location   Medications reviewed and all medications are available in the home this visit.    The following education was provided regarding medications:  JUANJO BIRD notified of any discrepancies/look a-like medications/medication interactions: JUANJO  Medications are effecrtive at this time.    Home health supplies by type and quantity ordered/delivered this visit include:  Supplies available   Patient education provided this visit:  Reviewed to reported any redness, swelling, warmth, fever, chills, increased heart/respiratory rate, uncontrolled pain/tenderness, drainage:green/yellow/brown, or odor to MD immediately. Wound care completed as ordered no s/s of infection noted at this time. Will monitor weelkly. Pt continues to go to \[facility]\"" 3x a week with no complications at this time.  Danis education provided: JUANJO  Patient level of understanding of education provided: Verbalzied all understanding to above education  Skilled Care Performed this visit: Education and Wound Care  Patient response to procedure performed: Minimal pain during dressing change   Agency Progress toward goals: Progressing toward interventions above  Patient's Progress towards personal goals: when patient reaches goals and medication is managed, and disease processes are understood patient agrees and understand that discharge will take place

## 2024-01-18 ENCOUNTER — OFFICE VISIT (OUTPATIENT)
Age: 79
End: 2024-01-18
Payer: MEDICARE

## 2024-01-18 VITALS
SYSTOLIC BLOOD PRESSURE: 136 MMHG | HEIGHT: 74 IN | DIASTOLIC BLOOD PRESSURE: 74 MMHG | BODY MASS INDEX: 21.3 KG/M2 | OXYGEN SATURATION: 99 % | HEART RATE: 60 BPM | WEIGHT: 166 LBS | TEMPERATURE: 98.1 F

## 2024-01-18 DIAGNOSIS — N18.6 ESRD (END STAGE RENAL DISEASE) (HCC): Primary | ICD-10-CM

## 2024-01-18 PROCEDURE — 1123F ACP DISCUSS/DSCN MKR DOCD: CPT | Performed by: STUDENT IN AN ORGANIZED HEALTH CARE EDUCATION/TRAINING PROGRAM

## 2024-01-18 PROCEDURE — 3078F DIAST BP <80 MM HG: CPT | Performed by: STUDENT IN AN ORGANIZED HEALTH CARE EDUCATION/TRAINING PROGRAM

## 2024-01-18 PROCEDURE — 3075F SYST BP GE 130 - 139MM HG: CPT | Performed by: STUDENT IN AN ORGANIZED HEALTH CARE EDUCATION/TRAINING PROGRAM

## 2024-01-18 PROCEDURE — 99215 OFFICE O/P EST HI 40 MIN: CPT | Performed by: STUDENT IN AN ORGANIZED HEALTH CARE EDUCATION/TRAINING PROGRAM

## 2024-01-18 NOTE — PROGRESS NOTES
[facility]  [address],  [address]  [facility] [address]  Dept: [phone]           Chart reviewed for the following:   Lissett BAPTISTE RN, have reviewed the medications and updated the Allergic reactions for Aniceto Interiano Sr     TIME OUT performed immediately prior to start of procedure:   Lissett BAPTISTE RN, have performed the following reviews on Aniceto Interiano Sr prior to the start of the procedure:            * Patient was identified by name and date of birth   * Agreement that logan boot removed and reapplied   * Procedure site verified   * Patient was positioned for comfort  * Verbal consent was given by patient     Time: 1500      Date of procedure: 1/18/2024    Procedure performed by:  Serjio Anaya MD    How tolerated by patient: tolerated well     Comments:  leg washed and patted dry, applied mepilex ag and k2 wrap , sent order for continued wound care to >

## 2024-01-18 NOTE — PROGRESS NOTES
Total Encounter time: 40 mins      History and Physical    12/7/23  Mr. Interiano is a 79yo M with history noted below on HD MWF with history of DVT in 1965 and venous insufficiency since. In August he hit the left posterior leg on a door and since has an open wound with venous stasis.      Dopplerable non palpable pedal signals, minimal edema     Plan for OR debridement and fish skin placement next week  RADHA   Venous reflux bilateral  Continue coumadin, do not hold.     12/15/23  Procedure: Left posterior leg fish skin application Kerecis 7x10 x 2      12/21/23  Patient here for follow up. Doing well. Skin graft well accepted and some receding of the wound noted. Area dressed with adaptic, gauze, ABD, kerlix and coban wrap.      Plan for nursing visit next week for dressing change and will plan to skin graft first week of January 2024.      1/4/24  Patient wound nearly all granulated. Fish skin accepted and healed well. There is a distal subcentimeter area that is still open and will continue dressings until heals.      Dressings: Adaptic, gauze, ABD, Kerlix and Coban     RTC in 1 week for wound check  Home health dressing changes 3 times a week    1/18/24  Left ankle wound looks good. Nearly healed. There is a distal area which may require debridement in 2 weeks if does not heal. Patient on HD with left arm radiocephalic fistula duplex showing no outflow but fistula is currently functional.     Plan for left arm radiocephalic fistulogram transbrachial  To be scheduled  Wound check in 2 weeks.     Physical Exam:    /74 (Site: Right Upper Arm, Position: Sitting, Cuff Size: Medium Adult)   Pulse 60   Temp 98.1 °F (36.7 °C) (Temporal)   Ht 1.88 m (6' 2\")   Wt 75.3 kg (166 lb)   SpO2 99%   BMI 21.31 kg/m²        We reviewed the plan with the patient and the patient understands.    No follow-up provider specified.    Serjio Anaya MD      Past Medical History:   Diagnosis Date    Atrial fibrillation (HCC)

## 2024-01-19 ENCOUNTER — HOME CARE VISIT (OUTPATIENT)
Age: 79
End: 2024-01-19

## 2024-01-22 ASSESSMENT — ENCOUNTER SYMPTOMS: DYSPNEA ACTIVITY LEVEL: AFTER AMBULATING 10 - 20 FT

## 2024-01-23 ENCOUNTER — HOME CARE VISIT (OUTPATIENT)
Age: 79
End: 2024-01-23

## 2024-01-23 ENCOUNTER — TELEPHONE (OUTPATIENT)
Age: 79
End: 2024-01-23

## 2024-01-23 VITALS
TEMPERATURE: 97 F | RESPIRATION RATE: 16 BRPM | HEART RATE: 67 BPM | SYSTOLIC BLOOD PRESSURE: 128 MMHG | OXYGEN SATURATION: 99 % | DIASTOLIC BLOOD PRESSURE: 72 MMHG

## 2024-01-23 PROCEDURE — G0300 HHS/HOSPICE OF LPN EA 15 MIN: HCPCS

## 2024-01-23 NOTE — TELEPHONE ENCOUNTER
Spoke to patients daughterKaren on 01/23/2024 at 815am about surgery scheduled on 01/30/2024 with Dr. Anaya for Left Upper Extremity Fistulogram.     Patient instructions:   Check in at [facility] Lab at 6:30am.  Do not eat, drink or chew gum after midnight prior to surgery.   Only take heart and blood pressure medication with a sip of water the morning of the procedure.   Continue taking Eliquis per Dr. Anaya.  Patient instructed to have RIDE available when discharged from hospital. Patient is not allowed to drive, walk or go home using public transportation (including [employer] and [employer]).   Patient given hospital discharge appointment on 02/06/2024 and 1:30pm with Dr. Anaya.   Patient confirmed and repeated instructions.

## 2024-01-24 NOTE — HOME HEALTH
Skilled Needs: Education and Wound Care   Caregiver involvement: Pt independent with care with the exception of wound treatment due to location   Medications reviewed and all medications are available in the home this visit.    The following education was provided regarding medications:  JUANJO BIRD notified of any discrepancies/look a-like medications/medication interactions: JUANJO  Medications are effecrtive at this time.    Home health supplies by type and quantity ordered/delivered this visit include:  Supplies available   Patient education provided this visit:  Reviewed to reported any redness, swelling, warmth, fever, chills, increased heart/respiratory rate, uncontrolled pain/tenderness, drainage:green/yellow/brown, or odor to MD immediately.  Danis education provided: JUANJO  Patient level of understanding of education provided: Verbalzied all understanding to above education  Skilled Care Performed this visit: Education and Wound Care  Patient response to procedure performed: Minimal pain during dressing change   Agency Progress toward goals: Progressing toward interventions above  Patient's Progress towards personal goals: when patient reaches goals and medication is managed, and disease processes are understood patient agrees and understand that discharge will take place

## 2024-01-26 ENCOUNTER — HOME CARE VISIT (OUTPATIENT)
Age: 79
End: 2024-01-26

## 2024-01-29 ENCOUNTER — TELEPHONE (OUTPATIENT)
Age: 79
End: 2024-01-29

## 2024-01-29 NOTE — TELEPHONE ENCOUNTER
Called and left message to call back to remind patient about upcoming surgery scheduled on 01/30/2024 with Dr. Anaya for  Left Upper Extremity Fistulogram. Waiting for return call.

## 2024-01-30 ENCOUNTER — HOSPITAL ENCOUNTER (OUTPATIENT)
Facility: HOSPITAL | Age: 79
Setting detail: OUTPATIENT SURGERY
Discharge: HOME | End: 2024-01-30
Attending: STUDENT IN AN ORGANIZED HEALTH CARE EDUCATION/TRAINING PROGRAM | Admitting: STUDENT IN AN ORGANIZED HEALTH CARE EDUCATION/TRAINING PROGRAM
Payer: MEDICARE

## 2024-01-30 ENCOUNTER — HOME CARE VISIT (OUTPATIENT)
Age: 79
End: 2024-01-30

## 2024-01-30 VITALS
RESPIRATION RATE: 14 BRPM | HEIGHT: 74 IN | SYSTOLIC BLOOD PRESSURE: 148 MMHG | HEART RATE: 70 BPM | WEIGHT: 170 LBS | DIASTOLIC BLOOD PRESSURE: 103 MMHG | BODY MASS INDEX: 21.82 KG/M2 | OXYGEN SATURATION: 63 %

## 2024-01-30 DIAGNOSIS — N18.6 ESRD (END STAGE RENAL DISEASE) (HCC): ICD-10-CM

## 2024-01-30 DIAGNOSIS — T82.898A ARTERIOVENOUS FISTULA OCCLUSION, INITIAL ENCOUNTER (HCC): ICD-10-CM

## 2024-01-30 LAB
ANION GAP BLD CALC-SCNC: ABNORMAL (ref 10–20)
ANION GAP SERPL CALC-SCNC: 5 MMOL/L (ref 3–18)
BASOPHILS # BLD: 0 K/UL (ref 0–0.1)
BASOPHILS NFR BLD: 0 % (ref 0–2)
BUN SERPL-MCNC: 86 MG/DL (ref 7–18)
BUN/CREAT SERPL: 7 (ref 12–20)
CA-I BLD-MCNC: 1.1 MMOL/L (ref 1.12–1.32)
CALCIUM SERPL-MCNC: 10.8 MG/DL (ref 8.5–10.1)
CHLORIDE BLD-SCNC: 98 MMOL/L (ref 100–108)
CHLORIDE SERPL-SCNC: 95 MMOL/L (ref 100–111)
CO2 SERPL-SCNC: 27 MMOL/L (ref 21–32)
CREAT SERPL-MCNC: 11.7 MG/DL (ref 0.6–1.3)
CREAT UR-MCNC: 12.9 MG/DL (ref 0.6–1.3)
DIFFERENTIAL METHOD BLD: ABNORMAL
ECHO BSA: 2.01 M2
EOSINOPHIL # BLD: 0 K/UL (ref 0–0.4)
EOSINOPHIL NFR BLD: 1 % (ref 0–5)
ERYTHROCYTE [DISTWIDTH] IN BLOOD BY AUTOMATED COUNT: 14.8 % (ref 11.6–14.5)
GLUCOSE BLD STRIP.AUTO-MCNC: 91 MG/DL (ref 74–106)
GLUCOSE SERPL-MCNC: 102 MG/DL (ref 74–99)
HCT VFR BLD AUTO: 38.7 % (ref 36–48)
HGB BLD-MCNC: 12.7 G/DL (ref 13–16)
IMM GRANULOCYTES # BLD AUTO: 0 K/UL (ref 0–0.04)
IMM GRANULOCYTES NFR BLD AUTO: 0 % (ref 0–0.5)
INR PPP: 1.3 (ref 0.9–1.1)
LYMPHOCYTES # BLD: 1.4 K/UL (ref 0.9–3.6)
LYMPHOCYTES NFR BLD: 27 % (ref 21–52)
MCH RBC QN AUTO: 29.2 PG (ref 24–34)
MCHC RBC AUTO-ENTMCNC: 32.8 G/DL (ref 31–37)
MCV RBC AUTO: 89 FL (ref 78–100)
MONOCYTES # BLD: 0.6 K/UL (ref 0.05–1.2)
MONOCYTES NFR BLD: 11 % (ref 3–10)
NEUTS SEG # BLD: 3.3 K/UL (ref 1.8–8)
NEUTS SEG NFR BLD: 61 % (ref 40–73)
NRBC # BLD: 0 K/UL (ref 0–0.01)
NRBC BLD-RTO: 0 PER 100 WBC
PLATELET # BLD AUTO: 123 K/UL (ref 135–420)
PMV BLD AUTO: 10.8 FL (ref 9.2–11.8)
POTASSIUM BLD-SCNC: 5.9 MMOL/L (ref 3.5–5.5)
POTASSIUM SERPL-SCNC: 5.7 MMOL/L (ref 3.5–5.5)
PROTHROMBIN TIME: 16.4 SEC (ref 11.9–14.7)
RBC # BLD AUTO: 4.35 M/UL (ref 4.35–5.65)
SODIUM BLD-SCNC: 134 MMOL/L (ref 136–145)
SODIUM SERPL-SCNC: 127 MMOL/L (ref 136–145)
WBC # BLD AUTO: 5.4 K/UL (ref 4.6–13.2)

## 2024-01-30 PROCEDURE — 80048 BASIC METABOLIC PNL TOTAL CA: CPT

## 2024-01-30 PROCEDURE — 76882 US LMTD JT/FCL EVL NVASC XTR: CPT | Performed by: STUDENT IN AN ORGANIZED HEALTH CARE EDUCATION/TRAINING PROGRAM

## 2024-01-30 PROCEDURE — 85025 COMPLETE CBC W/AUTO DIFF WBC: CPT

## 2024-01-30 PROCEDURE — 80047 BASIC METABLC PNL IONIZED CA: CPT

## 2024-01-30 PROCEDURE — 99152 MOD SED SAME PHYS/QHP 5/>YRS: CPT | Performed by: STUDENT IN AN ORGANIZED HEALTH CARE EDUCATION/TRAINING PROGRAM

## 2024-01-30 PROCEDURE — 85610 PROTHROMBIN TIME: CPT

## 2024-01-30 NOTE — HOME HEALTH
spoke with daughter pt is having surgery today to have his dialysis port replaced due to being nonfunctional no nursin visit needed

## 2024-01-30 NOTE — PROGRESS NOTES
Cath holding summary:    0715: Patient ambulated from waiting area with rolling walker, placed on monitor 4. ID, NPO status, allergies verified. H&P reviewed, med rec completed. PIV inserted, blood sent to lab. Consent ready for signature.    0830: Verbal report given to [facility]IE [facility] being transferred to [facility] for ordered procedure. Report consisted of patient's Situation, Background, Assessment and Recommendations (SBAR). Information from the following report(s) Pre Procedure Checklist was reviewed with the receiving nurse. Opportunity for questions and clarification was provided.    0900: Verbal report received from TIFFANIE [facility] being received from [facility] for routine post-op. Report consisted of patient's Situation, Background, Assessment and Recommendations (SBAR). Information from the following report(s) MAR and Event Log was reviewed with the receiving nurse. Opportunity for questions and clarification provided.  Procedure: Fistulogram  Intervention: N/A  Site: Left, Arm      0930: AVS Discharge instructions reviewed with patient, all questions answered. Patient verbalized understanding, copy given. Procedural site within normal limits, PIV removed. No hematoma or bleeding noted from procedural or IV site. A&Ox4 no c/o pain, discharged with support person in stable condition. Escorted out to vehicle for transport home.

## 2024-01-30 NOTE — OP NOTE
Operative Note      Patient: Aniceto Interiano Sr  YOB: 1945  MRN: [id number]    Date of Procedure: 1/30/2024    Pre-Op Diagnosis Codes:     * ESRD (end stage renal disease) ([facility]) [N18.6]     * Arteriovenous fistula occlusion, initial encounter ([facility]) [T82.898A]    Post-Op Diagnosis: Same       Procedure: US examination of left upper extremity radiocephalic arteriovenous fistula, left leg dressing change    Surgeon(s):  Serjio Anaya MD    Assistant:   * No surgical staff found *    Anesthesia: * No anesthesia type entered *    Estimated Blood Loss (mL): Minimal    Complications: None    Specimens:   * No specimens in log *    Implants:  * No implants in log *      Drains: * No LDAs found *    Findings:   No patent outflow of the radiocephalic fistula  Patent thrill and functional fistula  Last accessed successfully yesterday without issues      Detailed Description of Procedure:   Patient brought to the cath lab. Left arm and leg were prepped and draped. Time out was performed. Left arm US exam was performed. Left leg dressing change was performed.     Serjio Anaya MD      Electronically signed by Serjio Anaya MD on 1/30/2024 at 11:07 AM

## 2024-02-02 ENCOUNTER — HOME CARE VISIT (OUTPATIENT)
Age: 79
End: 2024-02-02

## 2024-02-06 ENCOUNTER — HOME CARE VISIT (OUTPATIENT)
Age: 79
End: 2024-02-06

## 2024-02-06 ENCOUNTER — OFFICE VISIT (OUTPATIENT)
Age: 79
End: 2024-02-06
Payer: MEDICARE

## 2024-02-06 VITALS
SYSTOLIC BLOOD PRESSURE: 120 MMHG | HEIGHT: 74 IN | DIASTOLIC BLOOD PRESSURE: 70 MMHG | WEIGHT: 170 LBS | BODY MASS INDEX: 21.82 KG/M2 | HEART RATE: 68 BPM | RESPIRATION RATE: 18 BRPM

## 2024-02-06 DIAGNOSIS — I83.009 VENOUS ULCER WITH FAT LAYER EXPOSED (HCC): ICD-10-CM

## 2024-02-06 DIAGNOSIS — L97.902 VENOUS ULCER WITH FAT LAYER EXPOSED (HCC): ICD-10-CM

## 2024-02-06 DIAGNOSIS — N18.6 ESRD (END STAGE RENAL DISEASE) (HCC): Primary | ICD-10-CM

## 2024-02-06 PROCEDURE — 3078F DIAST BP <80 MM HG: CPT | Performed by: STUDENT IN AN ORGANIZED HEALTH CARE EDUCATION/TRAINING PROGRAM

## 2024-02-06 PROCEDURE — 1123F ACP DISCUSS/DSCN MKR DOCD: CPT | Performed by: STUDENT IN AN ORGANIZED HEALTH CARE EDUCATION/TRAINING PROGRAM

## 2024-02-06 PROCEDURE — 3074F SYST BP LT 130 MM HG: CPT | Performed by: STUDENT IN AN ORGANIZED HEALTH CARE EDUCATION/TRAINING PROGRAM

## 2024-02-06 PROCEDURE — 99214 OFFICE O/P EST MOD 30 MIN: CPT | Performed by: STUDENT IN AN ORGANIZED HEALTH CARE EDUCATION/TRAINING PROGRAM

## 2024-02-06 ASSESSMENT — PATIENT HEALTH QUESTIONNAIRE - PHQ9
SUM OF ALL RESPONSES TO PHQ QUESTIONS 1-9: 0
1. LITTLE INTEREST OR PLEASURE IN DOING THINGS: 0
SUM OF ALL RESPONSES TO PHQ QUESTIONS 1-9: 0
2. FEELING DOWN, DEPRESSED OR HOPELESS: 0
SUM OF ALL RESPONSES TO PHQ9 QUESTIONS 1 & 2: 0
SUM OF ALL RESPONSES TO PHQ QUESTIONS 1-9: 0
SUM OF ALL RESPONSES TO PHQ QUESTIONS 1-9: 0

## 2024-02-06 NOTE — PROGRESS NOTES
and coban.     RTC next week to evaluate wound.     Serjio Anaya MD      Physical Exam:    /70 (Site: Right Upper Arm, Position: Sitting, Cuff Size: Medium Adult)   Pulse 68   Resp 18   Ht 1.88 m (6' 2\")   Wt 77.1 kg (170 lb)   BMI 21.83 kg/m²      We reviewed the plan with the patient and the patient understands.    No follow-up provider specified.    Serjio Anaya MD      Past Medical History:   Diagnosis Date    Atrial fibrillation (HCC)     ESRD (end stage renal disease) (HCC)     Hemodialysis patient (HCC)     Hx of blood clots     Hypertension      Past Surgical History:   Procedure Laterality Date    EYE SURGERY      INVASIVE VASCULAR N/A 1/30/2024    Ultrasound guided vascular access performed by Serjio Anaya MD at [facility] CARDIAC CATH LAB    LEG SURGERY Left 12/15/2023    LEFT LEG WOUND DEBRIDEMENT WITH SKIN GRAFT APPLICATION WITH KERECIS performed by Serjio Anaya MD at [facility] CARDIAC SURGERY    VASCULAR SURGERY       Patient Active Problem List   Diagnosis    ESRD (end stage renal disease) (HCC)    Primary hypertension    History of DVT (deep vein thrombosis)    [place] (hard of hearing)    Chronic deep vein thrombosis (DVT) of proximal vein of right lower extremity (HCC)     Current Outpatient Medications   Medication Sig Dispense Refill    sucroferric oxyhydroxide (VELPHORO) 500 MG CHEW chewable tablet Take 1 tablet by mouth 3 times daily (with meals) 90 tablet 0    allopurinol (ZYLOPRIM) 100 MG tablet Take 1 tablet by mouth daily      calcitRIOL (ROCALTROL) 0.5 MCG capsule Take 1 capsule by mouth three times a week      calcium acetate (PHOSLO) 667 MG TABS Take 2 tablets by mouth See Admin Instructions      cinacalcet (SENSIPAR) 30 MG tablet Take 1 tablet by mouth three times a week      meclizine (ANTIVERT) 25 MG tablet Take 1 tablet by mouth 3 times daily as needed      acetaminophen (TYLENOL 8 HOUR ARTHRITIS PAIN) 650 MG extended release tablet Take 1 tablet by mouth every 8

## 2024-02-09 ENCOUNTER — HOME CARE VISIT (OUTPATIENT)
Age: 79
End: 2024-02-09

## 2024-02-09 PROCEDURE — G0300 HHS/HOSPICE OF LPN EA 15 MIN: HCPCS

## 2024-02-10 VITALS
SYSTOLIC BLOOD PRESSURE: 124 MMHG | HEART RATE: 67 BPM | RESPIRATION RATE: 16 BRPM | TEMPERATURE: 98.5 F | OXYGEN SATURATION: 97 % | DIASTOLIC BLOOD PRESSURE: 76 MMHG

## 2024-02-10 NOTE — HOME HEALTH
Skilled Needs: Education and Wound Care   Caregiver involvement: Pt independent with care with the exception of wound treatment due to location   Medications reviewed and all medications are available in the home this visit.    The following education was provided regarding medications:  JUANJO BIRD notified of any discrepancies/look a-like medications/medication interactions: JUANJO  Medications are effecrtive at this time.    Home health supplies by type and quantity ordered/delivered this visit include:  Supplies available   Patient education provided this visit:  Reviewed to reported any redness, swelling, warmth, fever, chills, increased heart/respiratory rate, uncontrolled pain/tenderness, drainage:green/yellow/brown, or odor to MD immediately. Pt continues dialysisi and is due to see the MD on tesday nurse will see on thursday and schedule discharge for the following week. Daughter is in agreance with POC.   aDnis education provided: JUANJO  Patient level of understanding of education provided: Verbalzied all understanding to above education  Skilled Care Performed this visit: Education and Wound Care  Patient response to procedure performed: Minimal pain during dressing change   Agency Progress toward goals: Progressing toward interventions above  Patient's Progress towards personal goals: when patient reaches goals and medication is managed, and disease processes are understood patient agrees and understand that discharge will take place

## 2024-02-13 ENCOUNTER — HOME CARE VISIT (OUTPATIENT)
Age: 79
End: 2024-02-13

## 2024-02-13 ENCOUNTER — OFFICE VISIT (OUTPATIENT)
Facility: CLINIC | Age: 79
End: 2024-02-13
Payer: MEDICARE

## 2024-02-13 VITALS
BODY MASS INDEX: 20.92 KG/M2 | WEIGHT: 163 LBS | TEMPERATURE: 96.8 F | HEART RATE: 64 BPM | RESPIRATION RATE: 20 BRPM | SYSTOLIC BLOOD PRESSURE: 122 MMHG | HEIGHT: 74 IN | DIASTOLIC BLOOD PRESSURE: 75 MMHG

## 2024-02-13 DIAGNOSIS — I10 PRIMARY HYPERTENSION: Primary | ICD-10-CM

## 2024-02-13 DIAGNOSIS — I51.7 CARDIOMEGALY: ICD-10-CM

## 2024-02-13 DIAGNOSIS — R53.1 WEAKNESS: ICD-10-CM

## 2024-02-13 DIAGNOSIS — J32.9 CHRONIC SINUSITIS, UNSPECIFIED LOCATION: ICD-10-CM

## 2024-02-13 DIAGNOSIS — I83.009 VENOUS ULCER (HCC): ICD-10-CM

## 2024-02-13 DIAGNOSIS — F03.90 DEMENTIA, UNSPECIFIED DEMENTIA SEVERITY, UNSPECIFIED DEMENTIA TYPE, UNSPECIFIED WHETHER BEHAVIORAL, PSYCHOTIC, OR MOOD DISTURBANCE OR ANXIETY (HCC): ICD-10-CM

## 2024-02-13 DIAGNOSIS — R68.89 CONGESTION OF THROAT: ICD-10-CM

## 2024-02-13 DIAGNOSIS — L97.909 VENOUS ULCER (HCC): ICD-10-CM

## 2024-02-13 DIAGNOSIS — N18.6 ESRD (END STAGE RENAL DISEASE) (HCC): ICD-10-CM

## 2024-02-13 DIAGNOSIS — I87.2 VENOUS INSUFFICIENCY: ICD-10-CM

## 2024-02-13 DIAGNOSIS — Z86.718 HISTORY OF DVT (DEEP VEIN THROMBOSIS): ICD-10-CM

## 2024-02-13 PROCEDURE — 3074F SYST BP LT 130 MM HG: CPT | Performed by: STUDENT IN AN ORGANIZED HEALTH CARE EDUCATION/TRAINING PROGRAM

## 2024-02-13 PROCEDURE — 1123F ACP DISCUSS/DSCN MKR DOCD: CPT | Performed by: STUDENT IN AN ORGANIZED HEALTH CARE EDUCATION/TRAINING PROGRAM

## 2024-02-13 PROCEDURE — 99214 OFFICE O/P EST MOD 30 MIN: CPT | Performed by: STUDENT IN AN ORGANIZED HEALTH CARE EDUCATION/TRAINING PROGRAM

## 2024-02-13 PROCEDURE — 3078F DIAST BP <80 MM HG: CPT | Performed by: STUDENT IN AN ORGANIZED HEALTH CARE EDUCATION/TRAINING PROGRAM

## 2024-02-13 RX ORDER — GUAIFENESIN 600 MG/1
600 TABLET, EXTENDED RELEASE ORAL 2 TIMES DAILY
Qty: 30 TABLET | Refills: 0 | Status: SHIPPED | OUTPATIENT
Start: 2024-02-13 | End: 2024-02-28

## 2024-02-13 RX ORDER — AZITHROMYCIN 250 MG/1
250 TABLET, FILM COATED ORAL SEE ADMIN INSTRUCTIONS
Qty: 6 TABLET | Refills: 0 | Status: SHIPPED | OUTPATIENT
Start: 2024-02-13 | End: 2024-02-18

## 2024-02-13 NOTE — PROGRESS NOTES
\"Have you been to the ER, urgent care clinic since your last visit?  Hospitalized since your last visit?\"    NO    “Have you seen or consulted any other health care providers outside of [facility] since your last visit?”    Yes

## 2024-02-13 NOTE — PROGRESS NOTES
HISTORY OF PRESENT ILLNESS  Aniceto Intreiano Sr is a 78 y.o. male presenting today for   Chief Complaint   Patient presents with    Hypertension    Wound Check        Venous insufficiency- Loc on L LE. Est with vascular. Had OR debridement and skin placement in Dec and skin is healing appropriately. Home health coming to home 3x/week for dressing changes.      HTN-Taking Amlodipine 10mg daily. Denies changes in vision hearing or headaches.      ESRD- Est with nephrology. Receiving dialysis M/W/F. Fistula working for now.      Dementia -Stable    Congestion- Started a few months ago. Has tried OTC vitamin C, cough drops. Notes thick yellow mucus in which he is needing to blow his nose every hour. Denies headache.              Review of Systems   HENT:  Positive for congestion.    Eyes:  Negative for visual disturbance.   Respiratory:  Negative for shortness of breath.    Cardiovascular:  Negative for chest pain.   Gastrointestinal:  Negative for abdominal pain.   Neurological:  Negative for headaches.         /75   Pulse 64   Temp 96.8 °F (36 °C) (Skin)   Resp 20   Ht 1.88 m (6' 2\")   Wt 73.9 kg (163 lb)   BMI 20.93 kg/m²     Physical Exam  HENT:      Nose: Congestion present.      Mouth/Throat:      Comments: MASK  Eyes:      Pupils: Pupils are equal, round, and reactive to light.   Cardiovascular:      Rate and Rhythm: Normal rate and regular rhythm.   Pulmonary:      Effort: Pulmonary effort is normal.      Breath sounds: Normal breath sounds.   Musculoskeletal:      Right lower leg: No edema.      Left lower leg: No edema.   Psychiatric:         Mood and Affect: Mood normal.         ASSESSMENT and PLAN    ICD-10-CM    1. Primary hypertension  I10       2. Congestion of throat  R68.89 guaiFENesin (MUCINEX) 600 MG extended release tablet      3. Chronic sinusitis, unspecified location  J32.9 azithromycin (ZITHROMAX) 250 MG tablet      4. Dementia, unspecified dementia severity, unspecified dementia type,

## 2024-02-14 ENCOUNTER — HOME CARE VISIT (OUTPATIENT)
Age: 79
End: 2024-02-14

## 2024-02-14 VITALS
RESPIRATION RATE: 18 BRPM | HEART RATE: 67 BPM | OXYGEN SATURATION: 97 % | SYSTOLIC BLOOD PRESSURE: 132 MMHG | DIASTOLIC BLOOD PRESSURE: 76 MMHG | TEMPERATURE: 97 F

## 2024-02-14 PROCEDURE — G0300 HHS/HOSPICE OF LPN EA 15 MIN: HCPCS

## 2024-02-14 NOTE — HOME HEALTH
Skilled Needs: Education and Wound Care   Caregiver involvement: Pt independent with care with the exception of wound treatment due to location and has help from family where he lives  Medications reviewed and all medications are available in the home this visit.    The following education was provided regarding medications:  JUANJO BIRD notified of any discrepancies/look a-like medications/medication interactions: JUANJO  Medications are effecrtive at this time.    Home health supplies by type and quantity ordered/delivered this visit include:  Supplies available   Patient education provided this visit:  Reviewed to reported any redness, swelling, warmth, fever, chills, increased heart/respiratory rate, uncontrolled pain/tenderness, drainage:green/yellow/brown, or odor to MD immediately. Wound is healed pt is being followed by Vascular from skin grafts they have placed. Pt was due to see MD on uesday but appt ws cancelled, Pt continues to atend dialysis 3x a week   [place] education provided: JUANJO  Patient level of understanding of education provided: Verbalzied all understanding to above education  Skilled Care Performed this visit: Education and Wound Care  Patient response to procedure performed: Minimal pain during dressing change   Agency Progress toward goals: Progressing toward interventions above  Patient's Progress towards personal goals: when patient reaches goals and medication is managed, and disease processes are understood patient agrees and understand that discharge will take place

## 2024-02-14 NOTE — HOME HEALTH
Pt has a PCP appt today as well as a [facility] appt no nursing visit needed today . MD aware due to appt today

## 2024-02-21 ENCOUNTER — HOME CARE VISIT (OUTPATIENT)
Age: 79
End: 2024-02-21

## 2024-02-22 ENCOUNTER — HOME CARE VISIT (OUTPATIENT)
Age: 79
End: 2024-02-22

## 2024-02-22 VITALS
OXYGEN SATURATION: 95 % | TEMPERATURE: 98 F | HEART RATE: 76 BPM | DIASTOLIC BLOOD PRESSURE: 78 MMHG | SYSTOLIC BLOOD PRESSURE: 132 MMHG | RESPIRATION RATE: 16 BRPM

## 2024-02-22 PROCEDURE — G0300 HHS/HOSPICE OF LPN EA 15 MIN: HCPCS

## 2024-02-23 NOTE — HOME HEALTH
Skilled Needs: Education and Wound Care   Caregiver involvement: Pt independent with care with the exception of wound treatment due to location   Medications reviewed and all medications are available in the home this visit.    The following education was provided regarding medications:  JUANJO BIRD notified of any discrepancies/look a-like medications/medication interactions: JUANJO  Medications are effecrtive at this time.    Home health supplies by type and quantity ordered/delivered this visit include:  Supplies available   Patient education provided this visit:  Reviewed to reported any redness, swelling, warmth, fever, chills, increased heart/respiratory rate, uncontrolled pain/tenderness, drainage:green/yellow/brown, or odor to MD immediately. Pt continues to be seen by the vascular dr for skin grafts. Pt continues to go to dialysis 3x a week with no concerns. no change in medication at this time  Danis education provided: JUANJO  Patient level of understanding of education provided: Verbalzied all understanding to above education  Skilled Care Performed this visit: Education and Wound Care  Patient response to procedure performed: Minimal pain during dressing change   Agency Progress toward goals: Progressing toward interventions above  Patient's Progress towards personal goals: when patient reaches goals and medication is managed, and disease processes are understood patient agrees and understand that discharge will take place

## 2024-02-26 ENCOUNTER — HOME CARE VISIT (OUTPATIENT)
Age: 79
End: 2024-02-26

## 2024-02-27 ENCOUNTER — HOME CARE VISIT (OUTPATIENT)
Age: 79
End: 2024-02-27

## 2024-02-29 ENCOUNTER — HOME CARE VISIT (OUTPATIENT)
Age: 79
End: 2024-02-29

## 2024-02-29 VITALS
HEART RATE: 69 BPM | TEMPERATURE: 98.5 F | SYSTOLIC BLOOD PRESSURE: 136 MMHG | RESPIRATION RATE: 16 BRPM | OXYGEN SATURATION: 98 % | DIASTOLIC BLOOD PRESSURE: 80 MMHG

## 2024-02-29 PROCEDURE — G0299 HHS/HOSPICE OF RN EA 15 MIN: HCPCS

## 2024-02-29 NOTE — HOME HEALTH
Skilled reason for visit: WOUND CARE    Caregiver involvement: DAUGHTER ASSIST WITH ADLS.MEAL PREP AND MEDICATION MANAGEMENT.    Medications reviewed and all medications are available in the home this visit.    The following education was provided regarding medications:  reviewed all medication bottles in home for frequency, side effects and precautions. verbalized understanding and repeat back        Medications are effective at this time.      Home health supplies by type and quantity ordered/delivered this visit include: WOUND CARE SUPPLIES IN HOME    Patient education provided this visit: see intervention tab.      Patient level of understanding of education provided: see intervention tab.      Patient response to procedure performed:  TOLERATED WOUND CARE WELL  Agency Progress toward goals: see intervention tab for progressing toward goals        Patient's Progress towards personal goals: see intervention tab for progressing toward goals        Home exercise program: cont to take all medications/diet as prescrived, follow pressure ulcer precautions, follow all fall precautions and use any assistive devices reccomended by therapy or medical providers, reported any abnormal s/sx of INFECTION/ESRD COMPLICATIONS to MD immediately, monitor daily weights. disease process teaching packets/ home care booklet for reference. call home care for any concerns/questions. keep all medical appts.      Continued need for the following skills: Nursing.    Plan for next visit: DISCHARGE    Patient and/or caregiver notified and agrees to changes in the Plan of Care: N/A.     The following discharge planning was discussed with the pt/caregiver: NURSING WILL DISCHARGE NEXT WEEK AFTER VASCUALR APPT ON TUESDAY.

## 2024-03-05 ENCOUNTER — HOME CARE VISIT (OUTPATIENT)
Age: 79
End: 2024-03-05

## 2024-03-08 ENCOUNTER — TELEPHONE (OUTPATIENT)
Age: 79
End: 2024-03-08

## 2024-03-08 ENCOUNTER — HOME CARE VISIT (OUTPATIENT)
Age: 79
End: 2024-03-08

## 2024-03-08 NOTE — TELEPHONE ENCOUNTER
Karen, daughter called and stated that patient can be discharged today from [facility] with the condition that they will remove his catheter that he uses for dialysis. Spoke to Lissett Robles, Practice Manager, RN and stated that if they remove his catheter he will not be able to dialyze because his access is clotted per studies done at the hospital. Daughter will have to make a decision to keep him and leave with a catheter so he can go to the facility for treatment. After checking, patient has [employer] which the medical group at [facility] does not participate with his insurance.     Waiting for daughter to call back.

## 2024-03-08 NOTE — TELEPHONE ENCOUNTER
Daughter, Karen navas stated that his dad was admitted on 03/05/2024 at [facility]. They were on their way to our office when he got in the car and stated he was not feeling well to bring him to the emergency room. Daughter brought him to [facility] and is admitted. Daughter stated that patient has not been getting full clearance and that it was not informed her to by the nurse from [facility] because it was the instruction of the patient not to tell her. We have not received information as well from the facility. Patient is scheduled to see Dr. Anaya on Tuesday, 03.12.24 at 9:00am if patient will be discharged this weekend.

## 2024-03-12 ENCOUNTER — HOME HEALTH ADMISSION (OUTPATIENT)
Age: 79
End: 2024-03-12

## 2024-03-12 ENCOUNTER — OFFICE VISIT (OUTPATIENT)
Age: 79
End: 2024-03-12
Payer: MEDICARE

## 2024-03-12 VITALS
RESPIRATION RATE: 18 BRPM | DIASTOLIC BLOOD PRESSURE: 60 MMHG | SYSTOLIC BLOOD PRESSURE: 110 MMHG | HEIGHT: 74 IN | HEART RATE: 68 BPM | WEIGHT: 163 LBS | BODY MASS INDEX: 20.92 KG/M2 | OXYGEN SATURATION: 95 %

## 2024-03-12 DIAGNOSIS — I83.009 VENOUS ULCER WITH FAT LAYER EXPOSED (HCC): Primary | ICD-10-CM

## 2024-03-12 DIAGNOSIS — L97.902 VENOUS ULCER WITH FAT LAYER EXPOSED (HCC): Primary | ICD-10-CM

## 2024-03-12 DIAGNOSIS — N18.6 ESRD (END STAGE RENAL DISEASE) (HCC): ICD-10-CM

## 2024-03-12 PROCEDURE — 3074F SYST BP LT 130 MM HG: CPT | Performed by: STUDENT IN AN ORGANIZED HEALTH CARE EDUCATION/TRAINING PROGRAM

## 2024-03-12 PROCEDURE — 99215 OFFICE O/P EST HI 40 MIN: CPT | Performed by: STUDENT IN AN ORGANIZED HEALTH CARE EDUCATION/TRAINING PROGRAM

## 2024-03-12 PROCEDURE — 3078F DIAST BP <80 MM HG: CPT | Performed by: STUDENT IN AN ORGANIZED HEALTH CARE EDUCATION/TRAINING PROGRAM

## 2024-03-12 PROCEDURE — 1123F ACP DISCUSS/DSCN MKR DOCD: CPT | Performed by: STUDENT IN AN ORGANIZED HEALTH CARE EDUCATION/TRAINING PROGRAM

## 2024-03-12 RX ORDER — QUETIAPINE FUMARATE 25 MG/1
1 TABLET, FILM COATED ORAL
COMMUNITY
Start: 2024-03-08

## 2024-03-12 ASSESSMENT — PATIENT HEALTH QUESTIONNAIRE - PHQ9
1. LITTLE INTEREST OR PLEASURE IN DOING THINGS: 0
2. FEELING DOWN, DEPRESSED OR HOPELESS: 0
SUM OF ALL RESPONSES TO PHQ QUESTIONS 1-9: 0
SUM OF ALL RESPONSES TO PHQ9 QUESTIONS 1 & 2: 0
SUM OF ALL RESPONSES TO PHQ QUESTIONS 1-9: 0

## 2024-03-12 NOTE — PROGRESS NOTES
Order for left arm vein mapping and referral to  for PT and wound care placed per verbal order from Dr. Anaya   
kerlix and coban.      RTC next week to evaluate wound.    3/12/24  Patient returns for follow up. Recently hospitalized for weakness, found to have hyperkalemia and occlusion of the left radiocephalic fistula. Patient has a functional left TDC.     His left ankle wound has nearly healed. Unfortunately he is very weak and feels fatigued after dialysis.     Plan for home health for physcial therapy  Left arm vein mapping to assess patency of left axillary vein  Will plan for left arm brachial to axillary vein graft when cleared    Physical Exam:    /60 (Site: Right Upper Arm, Position: Sitting, Cuff Size: Medium Adult)   Pulse 68   Resp 18   Ht 1.88 m (6' 2\")   Wt 73.9 kg (163 lb)   SpO2 95%   BMI 20.93 kg/m²        We reviewed the plan with the patient and the patient understands.    No follow-up provider specified.    Serjio Anaya MD      Past Medical History:   Diagnosis Date    Atrial fibrillation (HCC)     ESRD (end stage renal disease) ([facility])     Hemodialysis patient (HCC)     Hx of blood clots     Hypertension      Past Surgical History:   Procedure Laterality Date    EYE SURGERY      INVASIVE VASCULAR N/A 1/30/2024    Ultrasound guided vascular access performed by Serjio Anaya MD at [facility] CARDIAC CATH LAB    LEG SURGERY Left 12/15/2023    LEFT LEG WOUND DEBRIDEMENT WITH SKIN GRAFT APPLICATION WITH KERECIS performed by Serjio Anaya MD at [facility] CARDIAC SURGERY    VASCULAR SURGERY       Patient Active Problem List   Diagnosis    ESRD (end stage renal disease) (HCC)    Primary hypertension    History of DVT (deep vein thrombosis)    [place] (hard of hearing)    Chronic deep vein thrombosis (DVT) of proximal vein of right lower extremity (HCC)     Current Outpatient Medications   Medication Sig Dispense Refill    QUEtiapine (SEROQUEL) 25 MG tablet Take 1 tablet by mouth nightly      apixaban (ELIQUIS) 5 MG TABS tablet Take 1 tablet by mouth 2 times daily 180 tablet 0    sucroferric

## 2024-03-14 ENCOUNTER — HOME CARE VISIT (OUTPATIENT)
Age: 79
End: 2024-03-14

## 2024-03-14 ENCOUNTER — NURSE ONLY (OUTPATIENT)
Age: 79
End: 2024-03-14

## 2024-03-14 VITALS
BODY MASS INDEX: 20.92 KG/M2 | HEART RATE: 68 BPM | HEIGHT: 74 IN | DIASTOLIC BLOOD PRESSURE: 30 MMHG | RESPIRATION RATE: 18 BRPM | SYSTOLIC BLOOD PRESSURE: 86 MMHG | OXYGEN SATURATION: 98 % | WEIGHT: 163 LBS

## 2024-03-14 DIAGNOSIS — N18.6 ESRD (END STAGE RENAL DISEASE) (HCC): Primary | ICD-10-CM

## 2024-03-14 ASSESSMENT — PATIENT HEALTH QUESTIONNAIRE - PHQ9
1. LITTLE INTEREST OR PLEASURE IN DOING THINGS: 0
2. FEELING DOWN, DEPRESSED OR HOPELESS: 0
SUM OF ALL RESPONSES TO PHQ QUESTIONS 1-9: 0
SUM OF ALL RESPONSES TO PHQ QUESTIONS 1-9: 0
SUM OF ALL RESPONSES TO PHQ9 QUESTIONS 1 & 2: 0
SUM OF ALL RESPONSES TO PHQ QUESTIONS 1-9: 0
SUM OF ALL RESPONSES TO PHQ QUESTIONS 1-9: 0

## 2024-03-14 NOTE — PROGRESS NOTES
Patient came in for us of left arm AVF, when patient got up to button pants, pt felt like he was weak , pt assisted to chair , pt awake and responds when spoken too.  Daughter at patient's side. Pt's bp was 86/60 , pt does have bp that runs low, pt and daughter refuse the ED as this is patient's normal . Pt had dialysis yesterday . Patient was taken down by WC and daughter drove up to pick him up. Pt was assisted to the car with out issue.     Called daughter to make sure they arrived home ok , left message to call back .

## 2024-03-15 ENCOUNTER — HOME CARE VISIT (OUTPATIENT)
Age: 79
End: 2024-03-15

## 2024-03-16 ENCOUNTER — HOME CARE VISIT (OUTPATIENT)
Age: 79
End: 2024-03-16

## 2024-03-16 NOTE — CASE COMMUNICATION
I called patient for SOC appointment last night.  Patient did not answer, unable to leave message as mailbox was full.  Scheduling told.

## 2024-03-18 ENCOUNTER — HOME CARE VISIT (OUTPATIENT)
Age: 79
End: 2024-03-18

## 2024-03-18 NOTE — CASE COMMUNICATION
called patient 3.18.24 to schedule HH, if no return call today patient will be made a non admit due to inablility to reach patient to arrange HH.

## 2024-03-19 ENCOUNTER — HOME CARE VISIT (OUTPATIENT)
Age: 79
End: 2024-03-19

## 2024-03-25 RX ORDER — SUCROFERRIC OXYHYDROXIDE 500 MG/1
TABLET, CHEWABLE ORAL
Qty: 90 TABLET | Refills: 0 | Status: SHIPPED | OUTPATIENT
Start: 2024-03-25

## 2024-03-26 ENCOUNTER — HOME CARE VISIT (OUTPATIENT)
Age: 79
End: 2024-03-26

## 2024-03-26 ENCOUNTER — OFFICE VISIT (OUTPATIENT)
Age: 79
End: 2024-03-26
Payer: MEDICARE

## 2024-03-26 VITALS
BODY MASS INDEX: 22.72 KG/M2 | HEIGHT: 74 IN | SYSTOLIC BLOOD PRESSURE: 90 MMHG | WEIGHT: 177 LBS | DIASTOLIC BLOOD PRESSURE: 70 MMHG

## 2024-03-26 DIAGNOSIS — Z87.2 HEALED FOOT ULCER: ICD-10-CM

## 2024-03-26 DIAGNOSIS — N18.6 ESRD (END STAGE RENAL DISEASE) (HCC): Primary | ICD-10-CM

## 2024-03-26 PROCEDURE — 1123F ACP DISCUSS/DSCN MKR DOCD: CPT | Performed by: NURSE PRACTITIONER

## 2024-03-26 PROCEDURE — 99213 OFFICE O/P EST LOW 20 MIN: CPT | Performed by: NURSE PRACTITIONER

## 2024-03-26 PROCEDURE — 3078F DIAST BP <80 MM HG: CPT | Performed by: NURSE PRACTITIONER

## 2024-03-26 PROCEDURE — 3074F SYST BP LT 130 MM HG: CPT | Performed by: NURSE PRACTITIONER

## 2024-03-26 NOTE — PROGRESS NOTES
Total Encounter time: 30 mins      Plan   Patient was advised not to soak his foot or wound as this may cause maceration and increased flaking leading to a new opening.    Patient is ready to move forward with left arm brachial to axillary vein graft.  Advise  to schedule at least 4 weeks out.        History and Physical      3/26/24    Today the patient arrives for a wound check.  He is on dialysis MWF.  Today he denies complaints.  The wound appears closed and flaking.  There are areas of thin skin.      12/7/23  Mr. Interiano is a 77yo M with history noted below on HD MWF with history of DVT in 1965 and venous insufficiency since. In August he hit the left posterior leg on a door and since has an open wound with venous stasis.      Dopplerable non palpable pedal signals, minimal edema     Plan for OR debridement and fish skin placement next week  RADHA   Venous reflux bilateral  Continue coumadin, do not hold.     12/15/23  Procedure: Left posterior leg fish skin application Kerecis 7x10 x 2      12/21/23  Patient here for follow up. Doing well. Skin graft well accepted and some receding of the wound noted. Area dressed with adaptic, gauze, ABD, kerlix and coban wrap.      Plan for nursing visit next week for dressing change and will plan to skin graft first week of January 2024.      1/4/24  Patient wound nearly all granulated. Fish skin accepted and healed well. There is a distal subcentimeter area that is still open and will continue dressings until heals.      Dressings: Adaptic, gauze, ABD, Kerlix and Coban     RTC in 1 week for wound check  Home health dressing changes 3 times a week     1/18/24  Left ankle wound looks good. Nearly healed. There is a distal area which may require debridement in 2 weeks if does not heal. Patient on HD with left arm radiocephalic fistula duplex showing no outflow but fistula is currently functional.      Plan for left arm radiocephalic fistulogram transbrachial  To be

## 2024-04-02 ENCOUNTER — OFFICE VISIT (OUTPATIENT)
Age: 79
End: 2024-04-02
Payer: MEDICARE

## 2024-04-02 VITALS
HEIGHT: 74 IN | BODY MASS INDEX: 21.79 KG/M2 | DIASTOLIC BLOOD PRESSURE: 78 MMHG | SYSTOLIC BLOOD PRESSURE: 100 MMHG | WEIGHT: 169.75 LBS

## 2024-04-02 DIAGNOSIS — N18.6 ESRD (END STAGE RENAL DISEASE) (HCC): Primary | ICD-10-CM

## 2024-04-02 DIAGNOSIS — I83.009 VENOUS ULCER WITH FAT LAYER EXPOSED (HCC): ICD-10-CM

## 2024-04-02 DIAGNOSIS — L97.902 VENOUS ULCER WITH FAT LAYER EXPOSED (HCC): ICD-10-CM

## 2024-04-02 DIAGNOSIS — Z86.718 HISTORY OF DVT (DEEP VEIN THROMBOSIS): ICD-10-CM

## 2024-04-02 PROCEDURE — 99215 OFFICE O/P EST HI 40 MIN: CPT | Performed by: SURGERY

## 2024-04-02 PROCEDURE — 1123F ACP DISCUSS/DSCN MKR DOCD: CPT | Performed by: SURGERY

## 2024-04-02 PROCEDURE — 3078F DIAST BP <80 MM HG: CPT | Performed by: SURGERY

## 2024-04-02 PROCEDURE — 3074F SYST BP LT 130 MM HG: CPT | Performed by: SURGERY

## 2024-04-02 NOTE — PROGRESS NOTES
Serjio BLACKWOOD MD at [facility] CARDIAC CATH LAB    LEG SURGERY Left 12/15/2023    LEFT LEG WOUND DEBRIDEMENT WITH SKIN GRAFT APPLICATION WITH KERECIS performed by Serjio Anaya MD at [facility] CARDIAC SURGERY    VASCULAR SURGERY       Current Outpatient Medications   Medication Sig Dispense Refill    VELPHORO 500 MG CHEW chewable tablet CHEW AND SWALLOW 1 TABLET BY MOUTH THREE TIMES DAILY WITH MEALS 90 tablet 0    QUEtiapine (SEROQUEL) 25 MG tablet Take 1 tablet by mouth nightly      apixaban (ELIQUIS) 5 MG TABS tablet Take 1 tablet by mouth 2 times daily 180 tablet 0    allopurinol (ZYLOPRIM) 100 MG tablet Take 1 tablet by mouth daily      calcitRIOL (ROCALTROL) 0.5 MCG capsule Take 1 capsule by mouth three times a week      calcium acetate (PHOSLO) 667 MG TABS Take 2 tablets by mouth See Admin Instructions      cinacalcet (SENSIPAR) 30 MG tablet Take 1 tablet by mouth three times a week      acetaminophen (TYLENOL 8 HOUR ARTHRITIS PAIN) 650 MG extended release tablet Take 1 tablet by mouth every 8 hours as needed for Pain 60 tablet 3    amLODIPine (NORVASC) 10 MG tablet Take 1 tablet by mouth daily 90 tablet 2    promethazine (PHENERGAN) 12.5 MG tablet Take 1 tablet by mouth every 6 hours as needed for Nausea 120 tablet 2     No current facility-administered medications for this visit.     Allergies   Allergen Reactions    Influenza Virus Vaccine Other (See Comments)     Patient denies allergy, has had flu vaccine      Social History     Tobacco Use    Smoking status: Never     Passive exposure: Never    Smokeless tobacco: Never   Vaping Use    Vaping Use: Never used   Substance Use Topics    Alcohol use: Never    Drug use: Never     Family History   Problem Relation Age of Onset    Lung Cancer Mother

## 2024-04-08 ENCOUNTER — NURSE ONLY (OUTPATIENT)
Age: 79
End: 2024-04-08

## 2024-04-08 NOTE — PROGRESS NOTES
Pt came in to have ankle wound checked. Wound has scabbed over and no drainage. I applied bacitracin to wound and covered with a boarded gauze. Daughter will change every other day.

## 2024-04-10 NOTE — PERIOP NOTE
Instructions for your surgery at [facility]      Today's Date:  4/10/2024      Patient's Name:  Aniceto Interiano Sr           Surgery Date:  4/19/2024              Please enter the main entrance of the hospital and check-in at the  located in the lobby. Once checked in at the , you will take the elevators to the second floor, and report to the waiting room on the left. The room will say Procedure Registration.    Do NOT eat or drink anything, including candy, gum, or ice chips after midnight prior to your surgery, unless you have specific instructions from your surgeon or anesthesia provider to do so.  Brush your teeth before coming to the hospital. You may swish with water, but do not swallow.  No smoking/Vaping/E-Cigarettes 24 hours prior to the day of surgery.  No alcohol 24 hours prior to the day of surgery.  No recreational drugs for one week prior to the day of surgery.  Bring Photo ID, Insurance information, and Co-pay if required on day of surgery.  Bring in pertinent legal documents, such as, Medical Power of , DNR, Advance Directive, etc.  Leave all valuables, including money/purse, at home.  Remove all jewelry, including ALL body piercings, nail polish, acrylic nails, and makeup (including mascara); no lotions, powders, deodorant, or perfume/cologne/after shave on the skin.  Follow instruction for Hibiclens washes and CHG wipes from surgeon's office.   Glasses and dentures may be worn to the hospital. They must be removed prior to surgery. Please bring case/container for glasses or dentures.   Contact lenses should not be worn on day of surgery.   Call your doctor's office if symptoms of a cold or illness develop within 24-48 hours prior to your surgery.  Call your doctor's office if you have any questions concerning insurance or co-pays.  15. AN ADULT (relative or friend 18 years or older) MUST DRIVE YOU HOME AFTER YOUR SURGERY.  16. Please make

## 2024-04-18 ENCOUNTER — ANESTHESIA EVENT (OUTPATIENT)
Dept: CARDIOTHORACIC SURGERY | Facility: HOSPITAL | Age: 79
End: 2024-04-18
Payer: MEDICARE

## 2024-04-19 ENCOUNTER — APPOINTMENT (OUTPATIENT)
Facility: HOSPITAL | Age: 79
End: 2024-04-19
Attending: SURGERY
Payer: MEDICARE

## 2024-04-19 ENCOUNTER — HOSPITAL ENCOUNTER (OUTPATIENT)
Facility: HOSPITAL | Age: 79
Setting detail: OUTPATIENT SURGERY
Discharge: HOME | End: 2024-04-19
Attending: SURGERY | Admitting: SURGERY
Payer: MEDICARE

## 2024-04-19 ENCOUNTER — ANESTHESIA (OUTPATIENT)
Dept: CARDIOTHORACIC SURGERY | Facility: HOSPITAL | Age: 79
End: 2024-04-19
Payer: MEDICARE

## 2024-04-19 ENCOUNTER — TELEPHONE (OUTPATIENT)
Age: 79
End: 2024-04-19

## 2024-04-19 VITALS
OXYGEN SATURATION: 95 % | BODY MASS INDEX: 21.05 KG/M2 | HEART RATE: 75 BPM | HEIGHT: 74 IN | TEMPERATURE: 97.8 F | DIASTOLIC BLOOD PRESSURE: 72 MMHG | WEIGHT: 164 LBS | SYSTOLIC BLOOD PRESSURE: 129 MMHG | RESPIRATION RATE: 18 BRPM

## 2024-04-19 DIAGNOSIS — Z86.718 HISTORY OF DVT (DEEP VEIN THROMBOSIS): Primary | ICD-10-CM

## 2024-04-19 DIAGNOSIS — N18.6 ESRD (END STAGE RENAL DISEASE) (HCC): Primary | ICD-10-CM

## 2024-04-19 DIAGNOSIS — N18.6 ESRD (END STAGE RENAL DISEASE) (HCC): ICD-10-CM

## 2024-04-19 LAB
ANION GAP SERPL CALC-SCNC: 4 MMOL/L (ref 3–18)
BUN SERPL-MCNC: 33 MG/DL (ref 7–18)
BUN/CREAT SERPL: 5 (ref 12–20)
CALCIUM SERPL-MCNC: 10.6 MG/DL (ref 8.5–10.1)
CHLORIDE SERPL-SCNC: 97 MMOL/L (ref 100–111)
CO2 SERPL-SCNC: 34 MMOL/L (ref 21–32)
CREAT SERPL-MCNC: 6.4 MG/DL (ref 0.6–1.3)
ERYTHROCYTE [DISTWIDTH] IN BLOOD BY AUTOMATED COUNT: 14.4 % (ref 11.6–14.5)
GLUCOSE SERPL-MCNC: 98 MG/DL (ref 74–99)
HCT VFR BLD AUTO: 37.9 % (ref 36–48)
HGB BLD-MCNC: 11.6 G/DL (ref 13–16)
INR PPP: 1.1 (ref 0.9–1.1)
MCH RBC QN AUTO: 30.1 PG (ref 24–34)
MCHC RBC AUTO-ENTMCNC: 30.6 G/DL (ref 31–37)
MCV RBC AUTO: 98.2 FL (ref 78–100)
NRBC # BLD: 0 K/UL (ref 0–0.01)
NRBC BLD-RTO: 0 PER 100 WBC
PLATELET # BLD AUTO: 120 K/UL (ref 135–420)
PMV BLD AUTO: 11.4 FL (ref 9.2–11.8)
POTASSIUM SERPL-SCNC: 5.1 MMOL/L (ref 3.5–5.5)
PROTHROMBIN TIME: 14.1 SEC (ref 11.9–14.7)
RBC # BLD AUTO: 3.86 M/UL (ref 4.35–5.65)
SODIUM SERPL-SCNC: 135 MMOL/L (ref 136–145)
WBC # BLD AUTO: 8 K/UL (ref 4.6–13.2)

## 2024-04-19 PROCEDURE — 7100000000 HC PACU RECOVERY - FIRST 15 MIN: Performed by: SURGERY

## 2024-04-19 PROCEDURE — 7100000010 HC PHASE II RECOVERY - FIRST 15 MIN: Performed by: SURGERY

## 2024-04-19 PROCEDURE — 71045 X-RAY EXAM CHEST 1 VIEW: CPT

## 2024-04-19 PROCEDURE — 2720000010 HC SURG SUPPLY STERILE: Performed by: SURGERY

## 2024-04-19 PROCEDURE — 85610 PROTHROMBIN TIME: CPT

## 2024-04-19 PROCEDURE — 3600000002 HC SURGERY LEVEL 2 BASE: Performed by: SURGERY

## 2024-04-19 PROCEDURE — 3700000001 HC ADD 15 MINUTES (ANESTHESIA): Performed by: SURGERY

## 2024-04-19 PROCEDURE — 3700000000 HC ANESTHESIA ATTENDED CARE: Performed by: SURGERY

## 2024-04-19 PROCEDURE — 2580000003 HC RX 258: Performed by: ANESTHESIOLOGY

## 2024-04-19 PROCEDURE — 80048 BASIC METABOLIC PNL TOTAL CA: CPT

## 2024-04-19 PROCEDURE — 2580000003 HC RX 258: Performed by: SURGERY

## 2024-04-19 PROCEDURE — 2709999900 HC NON-CHARGEABLE SUPPLY: Performed by: SURGERY

## 2024-04-19 PROCEDURE — C1894 INTRO/SHEATH, NON-LASER: HCPCS | Performed by: SURGERY

## 2024-04-19 PROCEDURE — 7100000011 HC PHASE II RECOVERY - ADDTL 15 MIN: Performed by: SURGERY

## 2024-04-19 PROCEDURE — 2580000003 HC RX 258: Performed by: NURSE ANESTHETIST, CERTIFIED REGISTERED

## 2024-04-19 PROCEDURE — C1768 GRAFT, VASCULAR: HCPCS | Performed by: SURGERY

## 2024-04-19 PROCEDURE — 6370000000 HC RX 637 (ALT 250 FOR IP): Performed by: NURSE ANESTHETIST, CERTIFIED REGISTERED

## 2024-04-19 PROCEDURE — 3600000012 HC SURGERY LEVEL 2 ADDTL 15MIN: Performed by: SURGERY

## 2024-04-19 PROCEDURE — 85027 COMPLETE CBC AUTOMATED: CPT

## 2024-04-19 PROCEDURE — 6360000002 HC RX W HCPCS: Performed by: SURGERY

## 2024-04-19 PROCEDURE — 2500000003 HC RX 250 WO HCPCS: Performed by: ANESTHESIOLOGY

## 2024-04-19 PROCEDURE — C1881 DIALYSIS ACCESS SYSTEM: HCPCS | Performed by: SURGERY

## 2024-04-19 PROCEDURE — 6360000002 HC RX W HCPCS: Performed by: ANESTHESIOLOGY

## 2024-04-19 PROCEDURE — 6370000000 HC RX 637 (ALT 250 FOR IP): Performed by: SURGERY

## 2024-04-19 PROCEDURE — 7100000001 HC PACU RECOVERY - ADDTL 15 MIN: Performed by: SURGERY

## 2024-04-19 PROCEDURE — A4217 STERILE WATER/SALINE, 500 ML: HCPCS | Performed by: SURGERY

## 2024-04-19 PROCEDURE — 2500000003 HC RX 250 WO HCPCS: Performed by: SURGERY

## 2024-04-19 DEVICE — GRAFT VASC L45CM DIA4-7MM PTFE CBAS HEP SURF STD WALLED: Type: IMPLANTABLE DEVICE | Site: ARM | Status: FUNCTIONAL

## 2024-04-19 RX ORDER — LIDOCAINE HYDROCHLORIDE 20 MG/ML
INJECTION, SOLUTION EPIDURAL; INFILTRATION; INTRACAUDAL; PERINEURAL PRN
Status: DISCONTINUED | OUTPATIENT
Start: 2024-04-19 | End: 2024-04-19 | Stop reason: SDUPTHER

## 2024-04-19 RX ORDER — LIDOCAINE HYDROCHLORIDE 10 MG/ML
INJECTION, SOLUTION EPIDURAL; INFILTRATION; INTRACAUDAL; PERINEURAL PRN
Status: DISCONTINUED | OUTPATIENT
Start: 2024-04-19 | End: 2024-04-19 | Stop reason: ALTCHOICE

## 2024-04-19 RX ORDER — FENTANYL CITRATE 50 UG/ML
50 INJECTION, SOLUTION INTRAMUSCULAR; INTRAVENOUS EVERY 5 MIN PRN
Status: DISCONTINUED | OUTPATIENT
Start: 2024-04-19 | End: 2024-04-19 | Stop reason: HOSPADM

## 2024-04-19 RX ORDER — HEPARIN SODIUM 5000 [USP'U]/ML
INJECTION, SOLUTION INTRAVENOUS; SUBCUTANEOUS
Status: DISCONTINUED
Start: 2024-04-19 | End: 2024-04-19 | Stop reason: HOSPADM

## 2024-04-19 RX ORDER — SODIUM CHLORIDE 0.9 % (FLUSH) 0.9 %
5-40 SYRINGE (ML) INJECTION EVERY 12 HOURS SCHEDULED
Status: DISCONTINUED | OUTPATIENT
Start: 2024-04-19 | End: 2024-04-19 | Stop reason: HOSPADM

## 2024-04-19 RX ORDER — HEPARIN SODIUM 200 [USP'U]/100ML
INJECTION, SOLUTION INTRAVENOUS CONTINUOUS PRN
Status: COMPLETED | OUTPATIENT
Start: 2024-04-19 | End: 2024-04-19

## 2024-04-19 RX ORDER — OXYCODONE HYDROCHLORIDE 5 MG/1
5 TABLET ORAL EVERY 8 HOURS PRN
Qty: 3 TABLET | Refills: 0 | Status: SHIPPED | OUTPATIENT
Start: 2024-04-19 | End: 2024-04-22

## 2024-04-19 RX ORDER — SODIUM CHLORIDE 9 MG/ML
INJECTION, SOLUTION INTRAVENOUS CONTINUOUS PRN
Status: DISCONTINUED | OUTPATIENT
Start: 2024-04-19 | End: 2024-04-19 | Stop reason: SDUPTHER

## 2024-04-19 RX ORDER — MAGNESIUM HYDROXIDE 1200 MG/15ML
LIQUID ORAL CONTINUOUS PRN
Status: COMPLETED | OUTPATIENT
Start: 2024-04-19 | End: 2024-04-19

## 2024-04-19 RX ORDER — FAMOTIDINE 20 MG/1
20 TABLET, FILM COATED ORAL ONCE
Status: DISCONTINUED | OUTPATIENT
Start: 2024-04-19 | End: 2024-04-19 | Stop reason: HOSPADM

## 2024-04-19 RX ORDER — SODIUM CHLORIDE 9 MG/ML
INJECTION, SOLUTION INTRAVENOUS CONTINUOUS
Status: DISCONTINUED | OUTPATIENT
Start: 2024-04-19 | End: 2024-04-19 | Stop reason: HOSPADM

## 2024-04-19 RX ORDER — OXYCODONE HYDROCHLORIDE 5 MG/1
5 TABLET ORAL ONCE
Status: COMPLETED | OUTPATIENT
Start: 2024-04-19 | End: 2024-04-19

## 2024-04-19 RX ORDER — CEFAZOLIN SODIUM 1 G/3ML
INJECTION, POWDER, FOR SOLUTION INTRAMUSCULAR; INTRAVENOUS PRN
Status: DISCONTINUED | OUTPATIENT
Start: 2024-04-19 | End: 2024-04-19 | Stop reason: SDUPTHER

## 2024-04-19 RX ORDER — OXYCODONE HYDROCHLORIDE 5 MG/1
5 TABLET ORAL EVERY 8 HOURS PRN
Qty: 3 TABLET | Refills: 0 | Status: SHIPPED | OUTPATIENT
Start: 2024-04-19 | End: 2024-04-19

## 2024-04-19 RX ORDER — PHENYLEPHRINE HCL IN 0.9% NACL 1 MG/10 ML
SYRINGE (ML) INTRAVENOUS PRN
Status: DISCONTINUED | OUTPATIENT
Start: 2024-04-19 | End: 2024-04-19 | Stop reason: SDUPTHER

## 2024-04-19 RX ORDER — DROPERIDOL 2.5 MG/ML
0.62 INJECTION, SOLUTION INTRAMUSCULAR; INTRAVENOUS
Status: DISCONTINUED | OUTPATIENT
Start: 2024-04-19 | End: 2024-04-19 | Stop reason: HOSPADM

## 2024-04-19 RX ORDER — BUPIVACAINE HYDROCHLORIDE 2.5 MG/ML
INJECTION, SOLUTION EPIDURAL; INFILTRATION; INTRACAUDAL
Status: DISCONTINUED
Start: 2024-04-19 | End: 2024-04-19 | Stop reason: HOSPADM

## 2024-04-19 RX ORDER — VASOPRESSIN 20 U/ML
INJECTION PARENTERAL
Status: DISCONTINUED
Start: 2024-04-19 | End: 2024-04-19 | Stop reason: WASHOUT

## 2024-04-19 RX ORDER — HEPARIN SODIUM 200 [USP'U]/100ML
INJECTION, SOLUTION INTRAVENOUS
Status: DISCONTINUED
Start: 2024-04-19 | End: 2024-04-19 | Stop reason: HOSPADM

## 2024-04-19 RX ORDER — CEFAZOLIN SODIUM 1 G/3ML
INJECTION, POWDER, FOR SOLUTION INTRAMUSCULAR; INTRAVENOUS
Status: COMPLETED
Start: 2024-04-19 | End: 2024-04-19

## 2024-04-19 RX ORDER — DIPHENHYDRAMINE HYDROCHLORIDE 50 MG/ML
12.5 INJECTION INTRAMUSCULAR; INTRAVENOUS
Status: DISCONTINUED | OUTPATIENT
Start: 2024-04-19 | End: 2024-04-19 | Stop reason: HOSPADM

## 2024-04-19 RX ORDER — HEPARIN SODIUM 5000 [USP'U]/ML
INJECTION, SOLUTION INTRAVENOUS; SUBCUTANEOUS PRN
Status: DISCONTINUED | OUTPATIENT
Start: 2024-04-19 | End: 2024-04-19 | Stop reason: ALTCHOICE

## 2024-04-19 RX ORDER — DEXTROSE MONOHYDRATE 100 MG/ML
INJECTION, SOLUTION INTRAVENOUS CONTINUOUS PRN
Status: DISCONTINUED | OUTPATIENT
Start: 2024-04-19 | End: 2024-04-19 | Stop reason: HOSPADM

## 2024-04-19 RX ORDER — ACETAMINOPHEN 160 MG/5ML
650 LIQUID ORAL
Status: DISCONTINUED | OUTPATIENT
Start: 2024-04-19 | End: 2024-04-19 | Stop reason: HOSPADM

## 2024-04-19 RX ORDER — IODIXANOL 320 MG/ML
INJECTION, SOLUTION INTRAVASCULAR
Status: DISCONTINUED
Start: 2024-04-19 | End: 2024-04-19 | Stop reason: HOSPADM

## 2024-04-19 RX ORDER — FENTANYL CITRATE 50 UG/ML
25 INJECTION, SOLUTION INTRAMUSCULAR; INTRAVENOUS EVERY 5 MIN PRN
Status: DISCONTINUED | OUTPATIENT
Start: 2024-04-19 | End: 2024-04-19 | Stop reason: HOSPADM

## 2024-04-19 RX ORDER — PROPOFOL 10 MG/ML
INJECTION, EMULSION INTRAVENOUS PRN
Status: DISCONTINUED | OUTPATIENT
Start: 2024-04-19 | End: 2024-04-19 | Stop reason: SDUPTHER

## 2024-04-19 RX ORDER — DESMOPRESSIN ACETATE 4 UG/ML
INJECTION, SOLUTION INTRAVENOUS; SUBCUTANEOUS
Status: DISCONTINUED
Start: 2024-04-19 | End: 2024-04-19 | Stop reason: HOSPADM

## 2024-04-19 RX ORDER — SODIUM CHLORIDE 0.9 % (FLUSH) 0.9 %
5-40 SYRINGE (ML) INJECTION PRN
Status: DISCONTINUED | OUTPATIENT
Start: 2024-04-19 | End: 2024-04-19 | Stop reason: HOSPADM

## 2024-04-19 RX ORDER — HEPARIN SODIUM 1000 [USP'U]/ML
INJECTION, SOLUTION INTRAVENOUS; SUBCUTANEOUS PRN
Status: DISCONTINUED | OUTPATIENT
Start: 2024-04-19 | End: 2024-04-19 | Stop reason: SDUPTHER

## 2024-04-19 RX ORDER — LIDOCAINE HYDROCHLORIDE 10 MG/ML
1 INJECTION, SOLUTION EPIDURAL; INFILTRATION; INTRACAUDAL; PERINEURAL
Status: DISCONTINUED | OUTPATIENT
Start: 2024-04-19 | End: 2024-04-19 | Stop reason: HOSPADM

## 2024-04-19 RX ORDER — ONDANSETRON 2 MG/ML
4 INJECTION INTRAMUSCULAR; INTRAVENOUS
Status: DISCONTINUED | OUTPATIENT
Start: 2024-04-19 | End: 2024-04-19 | Stop reason: HOSPADM

## 2024-04-19 RX ORDER — NALOXONE HYDROCHLORIDE 0.4 MG/ML
INJECTION, SOLUTION INTRAMUSCULAR; INTRAVENOUS; SUBCUTANEOUS PRN
Status: DISCONTINUED | OUTPATIENT
Start: 2024-04-19 | End: 2024-04-19 | Stop reason: HOSPADM

## 2024-04-19 RX ORDER — IPRATROPIUM BROMIDE AND ALBUTEROL SULFATE 2.5; .5 MG/3ML; MG/3ML
1 SOLUTION RESPIRATORY (INHALATION)
Status: DISCONTINUED | OUTPATIENT
Start: 2024-04-19 | End: 2024-04-19 | Stop reason: HOSPADM

## 2024-04-19 RX ADMIN — Medication 100 MCG: at 08:51

## 2024-04-19 RX ADMIN — SODIUM CHLORIDE: 9 INJECTION, SOLUTION INTRAVENOUS at 07:40

## 2024-04-19 RX ADMIN — CEFAZOLIN 2 G: 330 INJECTION, POWDER, FOR SOLUTION INTRAMUSCULAR; INTRAVENOUS at 08:02

## 2024-04-19 RX ADMIN — OXYCODONE HYDROCHLORIDE 5 MG: 5 TABLET ORAL at 14:13

## 2024-04-19 RX ADMIN — FAMOTIDINE 20 MG: 20 TABLET, FILM COATED ORAL at 06:42

## 2024-04-19 RX ADMIN — SODIUM CHLORIDE: 9 INJECTION, SOLUTION INTRAVENOUS at 06:43

## 2024-04-19 RX ADMIN — DESMOPRESSIN ACETATE 22.32 MCG: 4 SOLUTION INTRAVENOUS at 09:08

## 2024-04-19 RX ADMIN — LIDOCAINE HYDROCHLORIDE 100 MG: 20 INJECTION, SOLUTION EPIDURAL; INFILTRATION; INTRACAUDAL; PERINEURAL at 07:48

## 2024-04-19 RX ADMIN — HEPARIN SODIUM 3000 UNITS: 1000 INJECTION, SOLUTION INTRAVENOUS; SUBCUTANEOUS at 08:22

## 2024-04-19 RX ADMIN — PROPOFOL 100 MG: 10 INJECTION, EMULSION INTRAVENOUS at 07:48

## 2024-04-19 ASSESSMENT — PAIN SCALES - GENERAL: PAINLEVEL_OUTOF10: 4

## 2024-04-19 ASSESSMENT — PAIN DESCRIPTION - LOCATION: LOCATION: ARM

## 2024-04-19 ASSESSMENT — PAIN - FUNCTIONAL ASSESSMENT: PAIN_FUNCTIONAL_ASSESSMENT: 0-10

## 2024-04-19 ASSESSMENT — PAIN DESCRIPTION - DESCRIPTORS: DESCRIPTORS: ACHING

## 2024-04-19 ASSESSMENT — PAIN DESCRIPTION - ORIENTATION: ORIENTATION: LEFT;UPPER

## 2024-04-19 NOTE — PERIOP NOTE
Patient /Family /Designee has been informed that [facility] is not responsible for patient belongings per policy and the signed [facility] Patient Agreement document.  Personal items should be sent home or checked in with security.  Patient /Family /Designee selected the following action:                            [x]  Send personal items home with a family member or friend                                                 []  Check in personal items with security, excluding clothing                            []  Maintain personal items at the bedside, against recommendation                                 by [facility]                                   ** If patient /family /designee chooses to maintain personal items at the bedside,                                      Complete the patient belongings inventory in the EMR.

## 2024-04-19 NOTE — H&P
Vascular Surgery       History:   77 y/o M with ESRD undergoing HD via TDC. LUE AVG was recommended due to thrombosed LUE AVF and no further suitable veins for autogenous access.   He had a LLE wound that has now healed.     Pt and his daughter noted that the [facility] TDC is not functioning well and his center has requested exchange and/or replacement.       Past Medical History:   Diagnosis Date    Atrial fibrillation (HCC)     Cardiomegaly     CKD (chronic kidney disease)     stage 5    ESRD (end stage renal disease) (HCC)     Hemodialysis patient (HCC)     [place] (hard of hearing)     Hx of blood clots     Hypertension      Past Surgical History:   Procedure Laterality Date    EYE SURGERY      INVASIVE VASCULAR N/A 1/30/2024    Ultrasound guided vascular access performed by Serjio Anaya MD at [facility] CARDIAC CATH LAB    LEG SURGERY Left 12/15/2023    LEFT LEG WOUND DEBRIDEMENT WITH SKIN GRAFT APPLICATION WITH KERECIS performed by Serjio Anaya MD at [facility] CARDIAC SURGERY    VASCULAR SURGERY       No current facility-administered medications on file prior to encounter.     Current Outpatient Medications on File Prior to Encounter   Medication Sig Dispense Refill    Ascorbic Acid (VITAMIN C ER PO) Take by mouth      Cholecalciferol (VITAMIN D-3 PO) Take by mouth      Cyanocobalamin (VITAMIN B-12 CR PO) Take by mouth      VELPHORO 500 MG CHEW chewable tablet CHEW AND SWALLOW 1 TABLET BY MOUTH THREE TIMES DAILY WITH MEALS 90 tablet 0    QUEtiapine (SEROQUEL) 25 MG tablet Take 1 tablet by mouth nightly (Patient not taking: Reported on 4/10/2024)      apixaban (ELIQUIS) 5 MG TABS tablet Take 1 tablet by mouth 2 times daily 180 tablet 0    allopurinol (ZYLOPRIM) 100 MG tablet Take 1 tablet by mouth daily      calcitRIOL (ROCALTROL) 0.5 MCG capsule Take 1 capsule by mouth three times a week (Patient not taking: Reported on 4/10/2024)      calcium acetate (PHOSLO) 667 MG TABS Take 2 tablets by mouth See Admin Instructions  TDC      PLAN:   NAHOMI AVG. Will use [place] 4-7 tapered graft   Exchange and/or replacement of LIJ TDC  Risks, benefits and alternatives were discussed with the patient including but not limited to bleeding, infection, injury to surrounding structures including nerves and/or other organs, scar tissue accumulation, need for surveillance, need for further procedures, steal syndrome, venous fibrosis/scarring, DVT, pneumothorax with need for chest tube placement, revision/replacement of the catheter to maintain function for dialysis. The patient expressed understanding after the opportunity to ask questions, and consented to the procedure.       Alejandro Agee MD  Vascular Surgeon  [facility]

## 2024-04-19 NOTE — DISCHARGE INSTRUCTIONS
[facility]    Procedure(s) Performed:   Placement of a left upper extremity arteriovenous graft  Exchange of left internal jugular vein tunneled dialysis catheter for a new 28cm Palindrome catheter    Surgeon: Dr. Alejandro Agee MD    Discharge Instructions:    Allow the soap and water to run over your left arm incision(s) and gently pat dry. No tub bathing for 6-8 weeks.   Do not apply cream or ointment to the incisions. Keep a dry gauze over the incision(s) if there is mild drainage.   If the incisions become red or have worsening drainage, call the vascular clinic or seek emergency care immediately.   Take tylenol and oxycodone as needed for pain.   Follow up in vascular surgery clinic with Dr. Agee as scheduled.   Call our clinic and/or seek emergency care if you develop a fever, chills, shortness of breath, severe pain, wound drainage, wound redness, or any other concerns.     Restart Eliquis in 1 week (Friday, April 26)      Vascular Clinic Phone Number: [phone]        DISCHARGE SUMMARY from Nurse    PATIENT INSTRUCTIONS:    After general anesthesia or intravenous sedation, for 24 hours or while taking prescription Narcotics:  Limit your activities  Do not drive and operate hazardous machinery  Do not make important personal or business decisions  Do  not drink alcoholic beverages  If you have not urinated within 8 hours after discharge, please contact your surgeon on call.    Report the following to your surgeon:  Excessive pain, swelling, redness or odor of or around the surgical area  Temperature over 100.5  Nausea and vomiting lasting longer than 4 hours or if unable to take medications  Any signs of decreased circulation or nerve impairment to extremity: change in color, persistent  numbness, tingling, coldness or increase pain  Any questions          These are general instructions for a healthy lifestyle:    No smoking/ No tobacco products/ Avoid exposure  to second hand smoke  Surgeon General's Warning:  Quitting smoking now greatly reduces serious risk to your health.    Obesity, smoking, and sedentary lifestyle greatly increases your risk for illness    A healthy diet, regular physical exercise & weight monitoring are important for maintaining a healthy lifestyle    You may be retaining fluid if you have a history of heart failure or if you experience any of the following symptoms:  Weight gain of 3 pounds or more overnight or 5 pounds in a week, increased swelling in our hands or feet or shortness of breath while lying flat in bed.  Please call your doctor as soon as you notice any of these symptoms; do not wait until your next office visit.        The discharge information has been reviewed with the patient and daughter.  The patient and daughter verbalized understanding.  Discharge medications reviewed with the patient and daughter and appropriate educational materials and side effects teaching were provided.  ___________________________________________________________________________________________________________________________________

## 2024-04-19 NOTE — ANESTHESIA POSTPROCEDURE EVALUATION
Department of Anesthesiology  Postprocedure Note    Patient: Aniceto Interiano Sr  MRN: [id number]  YOB: 1945  Date of evaluation: 4/19/2024    Procedure Summary       Date: 04/19/24 Room / Location: [facility] CV 02 / [facility] CARDIAC SURGERY    Anesthesia Start: 0739 Anesthesia Stop: 0959    Procedures:       LEFT UPPER EXTREMITY BRACHIAL-AXILLARY ARTERIOVENOUS GRAFT CREATION (Left: Arm Upper)      TUNNELED DIALYSIS CATHETER EXCHANGE; C-ARM (Chest) Diagnosis:       ESRD (end stage renal disease) ([facility])      AV fistula occlusion, initial encounter ([facility])      (ESRD (end stage renal disease) ([facility]) [N18.6])      (AV fistula occlusion, initial encounter ([facility]) [T82.898A])    Surgeons: Alejandro Agee MD Responsible Provider: Lobo Murray MD    Anesthesia Type: general ASA Status: 4            Anesthesia Type: No value filed.    Harry Phase I: Harry Score: 10    Harry Phase II:      Anesthesia Post Evaluation    Patient location during evaluation: PACU  Patient participation: complete - patient participated  Level of consciousness: awake  Airway patency: patent  Nausea & Vomiting: no vomiting and no nausea  Cardiovascular status: hemodynamically stable  Respiratory status: acceptable  Hydration status: euvolemic  Pain management: adequate    No notable events documented.

## 2024-04-19 NOTE — ANESTHESIA PRE PROCEDURE
Department of Anesthesiology  Preprocedure Note       Name:  Aniceto Interiano Sr   Age:  78 y.o.  :  1945                                          MRN:  [id number]         Date:  2024      Surgeon: Surgeon(s):  Alejandro Agee MD    Procedure: Procedure(s):  LEFT UPPER EXTREMITY BRACHIAL-AXILLARY ARTERIOVENOUS GRAFT CREATION  TUNNELED DIALYSIS CATHETER EXCHANGE; C-ARM    Medications prior to admission:   Prior to Admission medications    Medication Sig Start Date End Date Taking? Authorizing Provider   Ascorbic Acid (VITAMIN C ER PO) Take by mouth   Yes Yasmine Patel MD   Cholecalciferol (VITAMIN D-3 PO) Take by mouth   Yes Yasmine Patel MD   Cyanocobalamin (VITAMIN B-12 CR PO) Take by mouth   Yes Yasmine Patel MD   VELPHORO 500 MG CHEW chewable tablet CHEW AND SWALLOW 1 TABLET BY MOUTH THREE TIMES DAILY WITH MEALS 3/25/24   Guillermina Dumont DO   QUEtiapine (SEROQUEL) 25 MG tablet Take 1 tablet by mouth nightly  Patient not taking: Reported on 4/10/2024 3/8/24   Yasmine Patel MD   apixaban (ELIQUIS) 5 MG TABS tablet Take 1 tablet by mouth 2 times daily 24  Guillermina Dumont DO   allopurinol (ZYLOPRIM) 100 MG tablet Take 1 tablet by mouth daily    Yasmine Patel MD   calcitRIOL (ROCALTROL) 0.5 MCG capsule Take 1 capsule by mouth three times a week  Patient not taking: Reported on 4/10/2024    Yasmine Patel MD   calcium acetate (PHOSLO) 667 MG TABS Take 2 tablets by mouth See Admin Instructions  Patient not taking: Reported on 4/10/2024    Yasmine Patel MD   cinacalcet (SENSIPAR) 30 MG tablet Take 1 tablet by mouth three times a week  Patient not taking: Reported on 4/10/2024    Yasmine Patel MD   acetaminophen (TYLENOL 8 HOUR ARTHRITIS PAIN) 650 MG extended release tablet Take 1 tablet by mouth every 8 hours as needed for Pain 23   Guillermina Dumont DO   amLODIPine (NORVASC) 10 MG tablet Take 1 tablet by mouth daily 23

## 2024-04-19 NOTE — PROGRESS NOTES
conducted a pre-surgery visit with Aniceto Interiano , who is a 78 y.o.,male.     The  provided the following Interventions:  Initiated a relationship of care and support with patient before his surgery this morning.Patient to have a cardiac stint placement. There is no advance directive on file. There is no advance directive on file.  Offered prayer and assurance of continued prayers on patient's behalf.     Plan:  Chaplains will continue to follow and will provide pastoral care on an as needed/requested basis.   recommends bedside caregivers page  on duty if patient shows signs of acute spiritual or emotional distress.  Chaplain Jared Juarez   Board Certified    Spiritual Care   [phone]

## 2024-04-23 NOTE — OP NOTE
Operative Note      Patient: Aniceto Interiano Sr  YOB: 1945  MRN: [id number]    Date of Procedure: 4/19/2024    Pre-Op Diagnosis Codes:     * ESRD (end stage renal disease) ([facility]) [N18.6]     * AV fistula occlusion, initial encounter ([facility]) [T82.898A]  Malfunctioning TDC    Post-Op Diagnosis: Same       Procedure(s):  LEFT UPPER EXTREMITY BRACHIAL-AXILLARY ARTERIOVENOUS GRAFT CREATION  TUNNELED DIALYSIS CATHETER EXCHANGE; C-ARM    Surgeon(s):  Alejandro Agee MD    Assistant:   Surgical Assistant: Ken Corral    Anesthesia: Monitor Anesthesia Care    Estimated Blood Loss (mL): 25mL    Complications: None    Specimens:   * No specimens in log *    Implants:  Implant Name Type Inv. Item Serial No.  Lot No. LRB No. Used Action   GRAFT VASC L45CM DIA4-7MM PTFE CBAS HEP SURF STD WALLED - [id number] Vascular grafts GRAFT VASC L45CM DIA4-7MM PTFE CBAS HEP SURF STD WALLED [id number]  [employer]  Left 1 Implanted         Drains: * No LDAs found *    Findings: Healthy brachial artery and axillary vein with strong thrill. Radial artery signal present. Brisk flush/aspiration from TDC after placement.     INDICATIONS: 79 y/o M with an occluded LUE RC AVF. LUE brachial axillary AVG was recommended. His TDC has also been malfunctioning. Exchange and/or replacement was requested by his dialysis team. Risks, benefits and alternatives were discussed with the patient including but not limited to bleeding, infection, injury to surrounding structures including nerves and/or other organs, scar tissue accumulation, need for surveillance, need for further procedures, steal syndrome, venous fibrosis/scarring, DVT, pneumothorax with need for chest tube placement, revision/replacement of the catheter to maintain function for dialysis. The patient expressed understanding after the opportunity to ask questions, and consented to the procedure.     PROCEDURE: The pt was brought to the OR

## 2024-04-24 ENCOUNTER — TELEPHONE (OUTPATIENT)
Age: 79
End: 2024-04-24

## 2024-04-25 ENCOUNTER — HOSPITAL ENCOUNTER (OUTPATIENT)
Facility: HOSPITAL | Age: 79
Setting detail: OUTPATIENT SURGERY
Discharge: HOME | End: 2024-04-25
Attending: STUDENT IN AN ORGANIZED HEALTH CARE EDUCATION/TRAINING PROGRAM | Admitting: STUDENT IN AN ORGANIZED HEALTH CARE EDUCATION/TRAINING PROGRAM
Payer: MEDICARE

## 2024-04-25 VITALS
OXYGEN SATURATION: 95 % | HEART RATE: 52 BPM | BODY MASS INDEX: 21.05 KG/M2 | DIASTOLIC BLOOD PRESSURE: 69 MMHG | TEMPERATURE: 98.1 F | WEIGHT: 164 LBS | RESPIRATION RATE: 16 BRPM | HEIGHT: 74 IN | SYSTOLIC BLOOD PRESSURE: 112 MMHG

## 2024-04-25 DIAGNOSIS — T82.898A ARTERIOVENOUS FISTULA OCCLUSION, INITIAL ENCOUNTER (HCC): ICD-10-CM

## 2024-04-25 DIAGNOSIS — N18.6 ESRD (END STAGE RENAL DISEASE) (HCC): ICD-10-CM

## 2024-04-25 LAB
ANION GAP BLD CALC-SCNC: 6 (ref 10–20)
CA-I BLD-MCNC: 1.25 MMOL/L (ref 1.12–1.32)
CHLORIDE BLD-SCNC: 98 MMOL/L (ref 100–108)
CO2 BLD-SCNC: 32 MMOL/L (ref 19–24)
CREAT UR-MCNC: 8.7 MG/DL (ref 0.6–1.3)
ECHO BSA: 1.97 M2
GLUCOSE BLD STRIP.AUTO-MCNC: 80 MG/DL (ref 74–106)
POTASSIUM BLD-SCNC: 4.4 MMOL/L (ref 3.5–5.5)
SODIUM BLD-SCNC: 136 MMOL/L (ref 136–145)

## 2024-04-25 PROCEDURE — 99152 MOD SED SAME PHYS/QHP 5/>YRS: CPT | Performed by: STUDENT IN AN ORGANIZED HEALTH CARE EDUCATION/TRAINING PROGRAM

## 2024-04-25 PROCEDURE — 75827 VEIN X-RAY CHEST: CPT | Performed by: STUDENT IN AN ORGANIZED HEALTH CARE EDUCATION/TRAINING PROGRAM

## 2024-04-25 PROCEDURE — C1769 GUIDE WIRE: HCPCS | Performed by: STUDENT IN AN ORGANIZED HEALTH CARE EDUCATION/TRAINING PROGRAM

## 2024-04-25 PROCEDURE — C1894 INTRO/SHEATH, NON-LASER: HCPCS | Performed by: STUDENT IN AN ORGANIZED HEALTH CARE EDUCATION/TRAINING PROGRAM

## 2024-04-25 PROCEDURE — 6360000002 HC RX W HCPCS: Performed by: STUDENT IN AN ORGANIZED HEALTH CARE EDUCATION/TRAINING PROGRAM

## 2024-04-25 PROCEDURE — 80047 BASIC METABLC PNL IONIZED CA: CPT

## 2024-04-25 PROCEDURE — 6360000004 HC RX CONTRAST MEDICATION: Performed by: STUDENT IN AN ORGANIZED HEALTH CARE EDUCATION/TRAINING PROGRAM

## 2024-04-25 PROCEDURE — C1725 CATH, TRANSLUMIN NON-LASER: HCPCS | Performed by: STUDENT IN AN ORGANIZED HEALTH CARE EDUCATION/TRAINING PROGRAM

## 2024-04-25 PROCEDURE — C1881 DIALYSIS ACCESS SYSTEM: HCPCS | Performed by: STUDENT IN AN ORGANIZED HEALTH CARE EDUCATION/TRAINING PROGRAM

## 2024-04-25 PROCEDURE — 76000 FLUOROSCOPY <1 HR PHYS/QHP: CPT | Performed by: STUDENT IN AN ORGANIZED HEALTH CARE EDUCATION/TRAINING PROGRAM

## 2024-04-25 PROCEDURE — 99153 MOD SED SAME PHYS/QHP EA: CPT | Performed by: STUDENT IN AN ORGANIZED HEALTH CARE EDUCATION/TRAINING PROGRAM

## 2024-04-25 PROCEDURE — 36581 REPLACE TUNNELED CV CATH: CPT | Performed by: STUDENT IN AN ORGANIZED HEALTH CARE EDUCATION/TRAINING PROGRAM

## 2024-04-25 PROCEDURE — 2500000003 HC RX 250 WO HCPCS: Performed by: STUDENT IN AN ORGANIZED HEALTH CARE EDUCATION/TRAINING PROGRAM

## 2024-04-25 PROCEDURE — 2709999900 HC NON-CHARGEABLE SUPPLY: Performed by: STUDENT IN AN ORGANIZED HEALTH CARE EDUCATION/TRAINING PROGRAM

## 2024-04-25 RX ORDER — HEPARIN SODIUM 1000 [USP'U]/ML
INJECTION, SOLUTION INTRAVENOUS; SUBCUTANEOUS PRN
Status: DISCONTINUED | OUTPATIENT
Start: 2024-04-25 | End: 2024-04-25 | Stop reason: HOSPADM

## 2024-04-25 RX ORDER — FENTANYL CITRATE 50 UG/ML
INJECTION, SOLUTION INTRAMUSCULAR; INTRAVENOUS PRN
Status: DISCONTINUED | OUTPATIENT
Start: 2024-04-25 | End: 2024-04-25 | Stop reason: HOSPADM

## 2024-04-25 RX ORDER — HYDRALAZINE HYDROCHLORIDE 20 MG/ML
10 INJECTION INTRAMUSCULAR; INTRAVENOUS EVERY 10 MIN PRN
Status: CANCELLED | OUTPATIENT
Start: 2024-04-25

## 2024-04-25 RX ORDER — ACETAMINOPHEN 325 MG/1
650 TABLET ORAL EVERY 4 HOURS PRN
Status: CANCELLED | OUTPATIENT
Start: 2024-04-25

## 2024-04-25 RX ORDER — MIDAZOLAM HYDROCHLORIDE 1 MG/ML
INJECTION INTRAMUSCULAR; INTRAVENOUS PRN
Status: DISCONTINUED | OUTPATIENT
Start: 2024-04-25 | End: 2024-04-25 | Stop reason: HOSPADM

## 2024-04-25 RX ORDER — ONDANSETRON 2 MG/ML
4 INJECTION INTRAMUSCULAR; INTRAVENOUS EVERY 6 HOURS PRN
Status: CANCELLED | OUTPATIENT
Start: 2024-04-25

## 2024-04-25 RX ORDER — IODIXANOL 320 MG/ML
INJECTION, SOLUTION INTRAVASCULAR PRN
Status: DISCONTINUED | OUTPATIENT
Start: 2024-04-25 | End: 2024-04-25 | Stop reason: HOSPADM

## 2024-04-25 RX ORDER — SODIUM CHLORIDE 0.9 % (FLUSH) 0.9 %
5-40 SYRINGE (ML) INJECTION PRN
Status: CANCELLED | OUTPATIENT
Start: 2024-04-25

## 2024-04-25 RX ORDER — NEBIVOLOL 2.5 MG/1
2.5 TABLET ORAL DAILY
COMMUNITY

## 2024-04-25 NOTE — PROGRESS NOTES
Cath holding summary:    0928: Patient ambulated from waiting area without difficulty, placed on monitor 4. A&O x3, no c/o pain. NPO since midnight, ID and allergies verified. H&P reviewed, med rec completed. PIV x1 inserted, blood sent to lab, consent ready for signature.    1047: Verbal report given to [facility] on [facility] being transferred to Cath Lab 2 for ordered procedure. Report consisted of patient's Situation, Background, Assessment and Recommendations (SBAR). Information from the following report(s) Adult Overview, Intake/Output, MAR, and Recent Results was reviewed with the receiving nurse. Opportunity for questions and clarification was provided.    1146: Verbal report received from [facility] on [facility] being received from Cath Lab  for routine post-op. Report consisted of patient's Situation, Background, Assessment and Recommendations (SBAR). Information from the following report(s) Adult Overview, Surgery Report, MAR, and Recent Results was reviewed with the receiving nurse. Pt A&O x 3, no c/o pain. Opportunity for questions and clarification provided.  Procedure: Tunneled Dialysis Catheter  Intervention: N/A  Site: Left, Chest        1252: AVS Discharge instructions reviewed with patient, all questions answered. Patient verbalized understanding, copy given. Procedural site within normal limits, PIV removed. No hematoma or bleeding noted from procedural or IV site. A&Ox4 no c/o pain, discharged in stable condition. Escorted out to vehicle by Jenniffer for transport home.

## 2024-04-25 NOTE — H&P
Department of Vascular Surgery  History and Physical        CHIEF COMPLAINT:  Problem with dialysis catheter        HISTORY OF PRESENT ILLNESS:    The patient is a 78 y.o. male with ESRD, dialyzing through L IJ TDC. He is also s/p L brach-ax AVG 1 week ago with a L IJ TDC exchange. At dialysis this week, he was having low flows through the catheter, so he is here for interrogation. He is otherwise in his usual state of health. Denies chest pain, SOB/ROMO.    Past Medical History:        Diagnosis Date    Atrial fibrillation (HCC)     Cardiomegaly     CKD (chronic kidney disease)     stage 5    ESRD (end stage renal disease) ([facility])     Hemodialysis patient ([facility])     [place] (hard of hearing)     Hx of blood clots     Hypertension      Past Surgical History:        Procedure Laterality Date    DIALYSIS FISTULA CREATION Left 4/19/2024    LEFT UPPER EXTREMITY BRACHIAL-AXILLARY ARTERIOVENOUS GRAFT CREATION performed by Alejandro Agee MD at [facility] CARDIAC [facility]GERY    EYE SURGERY      INVASIVE VASCULAR N/A 1/30/2024    Ultrasound guided vascular access performed by Serjio Anaya MD at [facility] CARDIAC CATH LAB    LEG SURGERY Left 12/15/2023    LEFT LEG WOUND DEBRIDEMENT WITH SKIN GRAFT APPLICATION WITH KERECIS performed by Serjio Anaya MD at [facility] CARDIAC SURGERY    PORT SURGERY N/A 4/19/2024    TUNNELED DIALYSIS CATHETER EXCHANGE; C-ARM performed by Alejandro Agee MD at [facility] CARDIAC SURGERY    VASCULAR SURGERY         Medications Prior to Admission:   Medications Prior to Admission: nebivolol (BYSTOLIC) 2.5 MG tablet, Take 1 tablet by mouth daily  Ascorbic Acid (VITAMIN C ER PO), Take by mouth  Cholecalciferol (VITAMIN D-3 PO), Take by mouth  Cyanocobalamin (VITAMIN B-12 CR PO), Take by mouth  VELPHORO 500 MG CHEW chewable tablet, CHEW AND SWALLOW 1 TABLET BY MOUTH THREE TIMES DAILY WITH MEALS  QUEtiapine (SEROQUEL) 25 MG tablet, Take 1 tablet by mouth nightly (Patient not taking: Reported on

## 2024-04-25 NOTE — DISCHARGE INSTRUCTIONS
than about 15 pounds. This may include a child, heavy grocery bags, a heavy briefcase or backpack, cat litter or dog food bags, or a vacuum .     You can shower, but keep the access dry for the first 2 days. Cover the area with a plastic bag to keep it dry.     Do not soak or scrub the incision until it has healed.     Wear an arm guard to protect the area if you play sports or work with your arms.     You may drive when your doctor says it is okay. This is usually in 1 to 2 days.     Most people are able to return to work about 1 or 2 days after surgery.   Diet    Follow an eating plan that is good for your kidneys. A registered dietitian can help you make a meal plan that is right for you. You may need to limit protein, salt, fluids, and certain foods.   Medicines    Your doctor will tell you if and when you can restart your medicines. You will also be given instructions about taking any new medicines.     If you stopped taking aspirin or some other blood thinner, your doctor will tell you when to start taking it again.     Take pain medicines exactly as directed.  If the doctor gave you a prescription medicine for pain, take it as prescribed.  If you are not taking a prescription pain medicine, ask your doctor if you can take acetaminophen (Tylenol). Do not take ibuprofen (Advil, Motrin) or naproxen (Aleve), or similar medicines, unless your doctor tells you to. They may make chronic kidney disease worse.  Do not take two or more pain medicines at the same time unless the doctor told you to. Many pain medicines have acetaminophen, which is Tylenol. Too much acetaminophen (Tylenol) can be harmful.     If you think your pain medicine is making you sick to your stomach:  Take your medicine after meals (unless your doctor has told you not to).  Ask your doctor for a different pain medicine.     If your doctor prescribed antibiotics, take them as directed. Do not stop taking them just because you feel better.

## 2024-04-25 NOTE — OP NOTE
Operative Report      Date of Surgery: 4/25/2024    Hospital: [facility]  Surgeon(s): Ricky Martinez MD  Assistant(s): None  Pre-operative Diagnosis: ESRD  Post-operative Diagnosis: ESRD  Procedure(s) Performed:   Tunneled Dialysis Catheter Exchange over Wire 23279  Fluoroscopic guidance 81490  Vena cava venogram with balloon angioplasty (10mm x 40mm)  Moderate Sedation 50342     Anesthesia:  Moderate sedation (26 minutes)    Moderate sedation services provided by the same physician or other qualified health care professional performing the diagnostic or therapeutic service that the sedation supports, requiring the presence of an independent trained observer to assist in the monitoring of the patient's level of consciousness and physiological status.    Findings:  Catheter flushed and nicole well at conclusion of the procedure.  Complications: None  Estimated Blood Loss:  Minimal  Tubes and Drains:  None  Specimens: * No specimens in log *    Indications for the procedure:  Aniceto Interiano Sr is a 78 y.o. with poor flow through his dialysis catheter. He is s/p L brach-ax AVG 1 week ago, waiting for maturation.  Patient was given the appropriate risk and benefits of the procedure including but not limited to bleeding, infection, damage to adjacent structures, MI, stroke, death, loss of lower extremity, need for further surgery.  Patient was understanding of all the risks and underwent a procedure.    Procedure in Detail:   After informed consent was obtained, the patient taken to the procedure room and  placed supine on the table. A surgical time-out was performed. The patient was identified and the procedure verified.  Prior to prep, I scanned the right neck with the ultrasound to demonstrate an occluded R IJ. The left chest was prepped with and draped in a sterile manner.  1% lidocaine was used for local anesthesia.  C-arm fluoroscopy was employed to demonstrate the position of the wires and catheter throughout the

## 2024-04-25 NOTE — PRE SEDATION
Sedation Plan  ASA: class 3 - patient with severe systemic disease     Mallampati class: II - soft palate, uvula, fauces visible.    Sedation plan: level 2-1: moderate/analgesia (conscious sedation) and moderate (conscious sedation)    Risks, benefits, and alternatives discussed with patient.        Immediate reassessment prior to sedation:  Patient's status reviewed and vital signs assessed; acceptable to perform procedure and proceed to administer sedation as planned.

## 2024-04-30 ENCOUNTER — TELEPHONE (OUTPATIENT)
Facility: CLINIC | Age: 79
End: 2024-04-30

## 2024-04-30 DIAGNOSIS — H91.90 HEARING LOSS, UNSPECIFIED HEARING LOSS TYPE, UNSPECIFIED LATERALITY: Primary | ICD-10-CM

## 2024-04-30 NOTE — TELEPHONE ENCOUNTER
Patient's daughter is calling in stating he has an Appt this Thur at [facility] for a hearing test but they are telling him he needs a referral from his PCP.    Please fax to [phone]

## 2024-05-10 ENCOUNTER — TELEPHONE (OUTPATIENT)
Age: 79
End: 2024-05-10

## 2024-05-10 NOTE — TELEPHONE ENCOUNTER
Daughter, Karen navas stated that his dad was sent home by [facility]  as the patients catheter for dialysis is clotted. He was not able to get treatment today. Called [facility] and confirmed that catheter is clotted. Patient scheduled on Monday, May 13 at 6:30am check in time at [facility], Cath lab. Last dose for Eliquis is today, May 10. Spoke to charge nurse at [facility] and they will be able to have chair time for him at 130pm after surgery. Daughter, Karen Interiano confirmed appointment.

## 2024-05-13 ENCOUNTER — HOSPITAL ENCOUNTER (OUTPATIENT)
Facility: HOSPITAL | Age: 79
Setting detail: OUTPATIENT SURGERY
Discharge: HOME | End: 2024-05-13
Attending: SURGERY | Admitting: SURGERY
Payer: MEDICARE

## 2024-05-13 VITALS
WEIGHT: 167 LBS | TEMPERATURE: 97.7 F | HEART RATE: 46 BPM | RESPIRATION RATE: 13 BRPM | BODY MASS INDEX: 21.43 KG/M2 | DIASTOLIC BLOOD PRESSURE: 65 MMHG | HEIGHT: 74 IN | SYSTOLIC BLOOD PRESSURE: 129 MMHG

## 2024-05-13 DIAGNOSIS — Z78.9 PROBLEM WITH VASCULAR ACCESS: ICD-10-CM

## 2024-05-13 LAB
ANION GAP SERPL CALC-SCNC: 9 MMOL/L (ref 3–18)
BASOPHILS # BLD: 0.1 K/UL (ref 0–0.1)
BASOPHILS NFR BLD: 1 % (ref 0–2)
BUN SERPL-MCNC: 77 MG/DL (ref 7–18)
BUN/CREAT SERPL: 6 (ref 12–20)
CALCIUM SERPL-MCNC: 10.3 MG/DL (ref 8.5–10.1)
CHLORIDE SERPL-SCNC: 96 MMOL/L (ref 100–111)
CO2 SERPL-SCNC: 29 MMOL/L (ref 21–32)
CREAT SERPL-MCNC: 13.4 MG/DL (ref 0.6–1.3)
DIFFERENTIAL METHOD BLD: ABNORMAL
ECHO BSA: 1.99 M2
EOSINOPHIL # BLD: 0.1 K/UL (ref 0–0.4)
EOSINOPHIL NFR BLD: 1 % (ref 0–5)
ERYTHROCYTE [DISTWIDTH] IN BLOOD BY AUTOMATED COUNT: 14.1 % (ref 11.6–14.5)
GLUCOSE SERPL-MCNC: 95 MG/DL (ref 74–99)
HCT VFR BLD AUTO: 39 % (ref 36–48)
HGB BLD-MCNC: 12.1 G/DL (ref 13–16)
IMM GRANULOCYTES # BLD AUTO: 0 K/UL (ref 0–0.04)
IMM GRANULOCYTES NFR BLD AUTO: 0 % (ref 0–0.5)
LYMPHOCYTES # BLD: 3.5 K/UL (ref 0.9–3.6)
LYMPHOCYTES NFR BLD: 50 % (ref 21–52)
MCH RBC QN AUTO: 30.5 PG (ref 24–34)
MCHC RBC AUTO-ENTMCNC: 31 G/DL (ref 31–37)
MCV RBC AUTO: 98.2 FL (ref 78–100)
MONOCYTES # BLD: 0.5 K/UL (ref 0.05–1.2)
MONOCYTES NFR BLD: 7 % (ref 3–10)
NEUTS SEG # BLD: 2.9 K/UL (ref 1.8–8)
NEUTS SEG NFR BLD: 41 % (ref 40–73)
NRBC # BLD: 0 K/UL (ref 0–0.01)
NRBC BLD-RTO: 0 PER 100 WBC
PLATELET # BLD AUTO: 114 K/UL (ref 135–420)
PMV BLD AUTO: 11.1 FL (ref 9.2–11.8)
POTASSIUM SERPL-SCNC: 5.2 MMOL/L (ref 3.5–5.5)
RBC # BLD AUTO: 3.97 M/UL (ref 4.35–5.65)
SODIUM SERPL-SCNC: 134 MMOL/L (ref 136–145)
WBC # BLD AUTO: 7.1 K/UL (ref 4.6–13.2)

## 2024-05-13 PROCEDURE — 80048 BASIC METABOLIC PNL TOTAL CA: CPT

## 2024-05-13 PROCEDURE — 85025 COMPLETE CBC W/AUTO DIFF WBC: CPT

## 2024-05-13 PROCEDURE — 76000 FLUOROSCOPY <1 HR PHYS/QHP: CPT | Performed by: SURGERY

## 2024-05-13 NOTE — PROGRESS NOTES
Cath holding summary:    0718: Patient ambulated from waiting area without difficulty, placed on monitor. A&O x 4, no c/o pain. NPO since midnight, ID and allergies verified. H&P reviewed, med rec completed. PIV x1 inserted, blood sent to lab. Groin prep completed, consent ready for signature.      Per MD Howard:  Pt to be d/c. TDC patent. Pt has chair time @ 1100.

## 2024-05-13 NOTE — PROGRESS NOTES
Cath holding summary:     0718: Patient wheeled from waiting area without difficulty, placed on monitor. A&O x 4, no c/o pain. NPO since midnight, ID and allergies verified. H&P reviewed, med rec completed. PIV x1 inserted, blood sent to lab. Groin prep completed, consent ready for signature.

## 2024-06-05 ENCOUNTER — TELEPHONE (OUTPATIENT)
Age: 79
End: 2024-06-05

## 2024-06-05 ENCOUNTER — OFFICE VISIT (OUTPATIENT)
Facility: CLINIC | Age: 79
End: 2024-06-05
Payer: MEDICARE

## 2024-06-05 VITALS
SYSTOLIC BLOOD PRESSURE: 125 MMHG | TEMPERATURE: 97.7 F | WEIGHT: 167 LBS | HEIGHT: 74 IN | RESPIRATION RATE: 18 BRPM | DIASTOLIC BLOOD PRESSURE: 83 MMHG | HEART RATE: 73 BPM | OXYGEN SATURATION: 93 % | BODY MASS INDEX: 21.43 KG/M2

## 2024-06-05 DIAGNOSIS — M19.90 CHRONIC ARTHRITIS: ICD-10-CM

## 2024-06-05 DIAGNOSIS — F11.90 CHRONIC, CONTINUOUS USE OF OPIOIDS: ICD-10-CM

## 2024-06-05 DIAGNOSIS — I50.9 CHRONIC CONGESTIVE HEART FAILURE, UNSPECIFIED HEART FAILURE TYPE (HCC): ICD-10-CM

## 2024-06-05 DIAGNOSIS — F03.90 DEMENTIA, UNSPECIFIED DEMENTIA SEVERITY, UNSPECIFIED DEMENTIA TYPE, UNSPECIFIED WHETHER BEHAVIORAL, PSYCHOTIC, OR MOOD DISTURBANCE OR ANXIETY (HCC): ICD-10-CM

## 2024-06-05 DIAGNOSIS — M79.672 FOOT PAIN, BILATERAL: ICD-10-CM

## 2024-06-05 DIAGNOSIS — G89.29 CHRONIC PAIN OF BOTH KNEES: Primary | ICD-10-CM

## 2024-06-05 DIAGNOSIS — I48.91 ATRIAL FIBRILLATION, UNSPECIFIED TYPE (HCC): ICD-10-CM

## 2024-06-05 DIAGNOSIS — I10 PRIMARY HYPERTENSION: ICD-10-CM

## 2024-06-05 DIAGNOSIS — B35.1 ONYCHOMYCOSIS: ICD-10-CM

## 2024-06-05 DIAGNOSIS — M79.671 FOOT PAIN, BILATERAL: ICD-10-CM

## 2024-06-05 DIAGNOSIS — M25.562 CHRONIC PAIN OF BOTH KNEES: Primary | ICD-10-CM

## 2024-06-05 DIAGNOSIS — D69.6 THROMBOCYTOPENIA, UNSPECIFIED (HCC): ICD-10-CM

## 2024-06-05 DIAGNOSIS — M25.561 CHRONIC PAIN OF BOTH KNEES: Primary | ICD-10-CM

## 2024-06-05 DIAGNOSIS — N18.6 ESRD (END STAGE RENAL DISEASE) (HCC): ICD-10-CM

## 2024-06-05 PROCEDURE — G2211 COMPLEX E/M VISIT ADD ON: HCPCS | Performed by: STUDENT IN AN ORGANIZED HEALTH CARE EDUCATION/TRAINING PROGRAM

## 2024-06-05 PROCEDURE — 3079F DIAST BP 80-89 MM HG: CPT | Performed by: STUDENT IN AN ORGANIZED HEALTH CARE EDUCATION/TRAINING PROGRAM

## 2024-06-05 PROCEDURE — 1123F ACP DISCUSS/DSCN MKR DOCD: CPT | Performed by: STUDENT IN AN ORGANIZED HEALTH CARE EDUCATION/TRAINING PROGRAM

## 2024-06-05 PROCEDURE — 3074F SYST BP LT 130 MM HG: CPT | Performed by: STUDENT IN AN ORGANIZED HEALTH CARE EDUCATION/TRAINING PROGRAM

## 2024-06-05 PROCEDURE — 99215 OFFICE O/P EST HI 40 MIN: CPT | Performed by: STUDENT IN AN ORGANIZED HEALTH CARE EDUCATION/TRAINING PROGRAM

## 2024-06-05 RX ORDER — NALOXONE HYDROCHLORIDE 4 MG/.1ML
1 SPRAY NASAL PRN
Qty: 1 EACH | Refills: 0 | Status: SHIPPED | OUTPATIENT
Start: 2024-06-05

## 2024-06-05 RX ORDER — TRAMADOL HYDROCHLORIDE 100 MG/1
100 TABLET, EXTENDED RELEASE ORAL DAILY PRN
Qty: 30 TABLET | Refills: 0 | Status: SHIPPED | OUTPATIENT
Start: 2024-06-05 | End: 2024-07-05

## 2024-06-05 ASSESSMENT — ENCOUNTER SYMPTOMS
SHORTNESS OF BREATH: 0
ABDOMINAL PAIN: 0

## 2024-06-05 ASSESSMENT — PATIENT HEALTH QUESTIONNAIRE - PHQ9
SUM OF ALL RESPONSES TO PHQ QUESTIONS 1-9: 2
SUM OF ALL RESPONSES TO PHQ9 QUESTIONS 1 & 2: 2
SUM OF ALL RESPONSES TO PHQ QUESTIONS 1-9: 2
1. LITTLE INTEREST OR PLEASURE IN DOING THINGS: SEVERAL DAYS
SUM OF ALL RESPONSES TO PHQ QUESTIONS 1-9: 2
2. FEELING DOWN, DEPRESSED OR HOPELESS: SEVERAL DAYS
SUM OF ALL RESPONSES TO PHQ QUESTIONS 1-9: 2

## 2024-06-05 NOTE — PROGRESS NOTES
\"Have you been to the ER, urgent care clinic since your last visit?  Hospitalized since your last visit?\"    Yes     “Have you seen or consulted any other health care providers outside of [facility] since your last visit?”    yes           
kg (167 lb)   SpO2 93%   BMI 21.44 kg/m²     Physical Exam  HENT:      Mouth/Throat:      Comments: MASK  Eyes:      Pupils: Pupils are equal, round, and reactive to light.   Cardiovascular:      Rate and Rhythm: Normal rate. Rhythm irregular.   Pulmonary:      Effort: Pulmonary effort is normal.      Breath sounds: Normal breath sounds.   Musculoskeletal:      Right lower leg: No edema.      Left lower leg: No edema.   Feet:      Right foot:      Skin integrity: Dry skin present.      Toenail Condition: Right toenails are abnormally thick. Fungal disease present.     Left foot:      Skin integrity: Dry skin present.      Toenail Condition: Left toenails are abnormally thick. Fungal disease present.  Psychiatric:         Mood and Affect: Mood normal.       ASSESSMENT and PLAN    ICD-10-CM    1. Chronic pain of both knees  M25.561     M25.562     G89.29       2. Chronic arthritis  M19.90 traMADol (ULTRAM ER) 100 MG extended release tablet      3. Foot pain, bilateral  M79.671     M79.672       4. Primary hypertension  I10       5. Atrial fibrillation, unspecified type ([facility])  I48.91       6. ESRD (end stage renal disease) ([facility])  N18.6       7. Dementia, unspecified dementia severity, unspecified dementia type, unspecified whether behavioral, psychotic, or mood disturbance or anxiety ([facility])  F03.90       8. Thrombocytopenia, unspecified ([facility])  D69.6       9. Chronic congestive heart failure, unspecified heart failure type ([facility])  I50.9 [facility] Neeraj Martinez MD, Cardiology, [place] [address]      10. Onychomycosis  B35.1       11. Chronic, continuous use of opioids  F11.90 naloxone 4 MG/0.1ML LIQD nasal spray      Chronic knee pain  -Starting Tramadol 100mg ER as pt unable to tolerated NSAID due to HD  -Pt signed substance abuse contract today    Foot pain  -Likely a result from hammer toe and chronic onychomycosis  -Provided with number to [facility] neurology again today    HTN-Chronic  -Stable. Continue current dose of

## 2024-06-06 ENCOUNTER — OFFICE VISIT (OUTPATIENT)
Age: 79
End: 2024-06-06
Payer: MEDICARE

## 2024-06-06 ENCOUNTER — TELEPHONE (OUTPATIENT)
Age: 79
End: 2024-06-06

## 2024-06-06 VITALS
DIASTOLIC BLOOD PRESSURE: 77 MMHG | BODY MASS INDEX: 21.3 KG/M2 | SYSTOLIC BLOOD PRESSURE: 124 MMHG | WEIGHT: 166 LBS | HEART RATE: 63 BPM | HEIGHT: 74 IN | OXYGEN SATURATION: 94 %

## 2024-06-06 DIAGNOSIS — I48.20 CHRONIC ATRIAL FIBRILLATION, UNSPECIFIED (HCC): ICD-10-CM

## 2024-06-06 DIAGNOSIS — I50.9 CONGESTIVE HEART FAILURE, UNSPECIFIED HF CHRONICITY, UNSPECIFIED HEART FAILURE TYPE (HCC): Primary | ICD-10-CM

## 2024-06-06 DIAGNOSIS — I48.3 TYPICAL ATRIAL FLUTTER (HCC): ICD-10-CM

## 2024-06-06 PROCEDURE — 1123F ACP DISCUSS/DSCN MKR DOCD: CPT | Performed by: INTERNAL MEDICINE

## 2024-06-06 PROCEDURE — 99204 OFFICE O/P NEW MOD 45 MIN: CPT | Performed by: INTERNAL MEDICINE

## 2024-06-06 PROCEDURE — 93000 ELECTROCARDIOGRAM COMPLETE: CPT | Performed by: INTERNAL MEDICINE

## 2024-06-06 PROCEDURE — 3074F SYST BP LT 130 MM HG: CPT | Performed by: INTERNAL MEDICINE

## 2024-06-06 PROCEDURE — 3078F DIAST BP <80 MM HG: CPT | Performed by: INTERNAL MEDICINE

## 2024-06-06 RX ORDER — SUCROFERRIC OXYHYDROXIDE 500 MG/1
TABLET, CHEWABLE ORAL
Qty: 270 TABLET | Refills: 1 | Status: SHIPPED | OUTPATIENT
Start: 2024-06-06

## 2024-06-06 NOTE — TELEPHONE ENCOUNTER
Called and left message to daughterKaren to schedule patient for surgery, left upper extremity thrombectomy (open) with possible fistulogram at [facility] for June 11, 2024 with Dr. Agee. Waiting for daughter to call back.

## 2024-06-06 NOTE — PATIENT INSTRUCTIONS
Echo  PATIENT PREPS:   -Wear easy to remove shirt to your appointment for easier accessibility.      **call office 3-5 days after testing is completed for results** Please ensure testing is completed prior to scheduled follow up appointment. If it is not completed your appointment may be rescheduled**    New Medication/Medication Changes/Medication Refill  Eliquis 2.5 mg tab twice a day    **please allow 24-48 hrs for medication to be escribed to pharmacy** If you need any refills on medications please contact your pharmacy so that the request can be escribed to the provider for review seven to 10 days prior to being out of medication.

## 2024-06-06 NOTE — TELEPHONE ENCOUNTER
Last Appointment:  6/5/2024  Future Appointments   Date Time Provider Department Center   8/1/2024 12:30 PM Guillermina Dumont DO [facility]

## 2024-06-06 NOTE — TELEPHONE ENCOUNTER
Daughter, Karen called back at 130pm. Informed her that we received fax from [facility] regarding patient graft on his left arm. Stated that there is no thrill and no bruit. Informed her that Dr. Agee wants to set up surgery for next week, June 11, 2024 for Left Upper Extremity Thrombectomy (open) with possible fistulogram at [facility]. Daughter confirmed.   Check in at 1130am at [facility] Main entrance. NPO midnight prior or surgery. Take last dose of Eliquis on June 8, 2024. Daughter confirmed. Will call daughter the day before surgery.

## 2024-06-06 NOTE — PROGRESS NOTES
[facility]    Aniceto Interiano Sr is 78 y.o. male     Patient is here today for cardiac evaluation  Denies prior history of MI or CHF  Patient was sent to me for management of atrial fibrillation.  Patient has mild memory loss and also has significant hearing deficit.  He is accompanied with a daughter who is also providing history.  Patient has relocated in this area from [place]  Patient was admitted to hospital recently after having malfunction of fistula and needed stent as well as tunneled dialysis catheter for dialysis.  Receiving dialysis 3 times a week.  During hospitalization, patient was found to have atrial fibrillation.  Patient is on apixaban.  No bleeding side effect.  Currently denies any chest pain or chest tightness.  According to daughter he is sedentary.  He does not perform really any exercise.  No chest pain or chest tightness.  No orthopnea or PND.  Denies any nausea, vomiting, abdominal pain, fever, chills, sputum production. No hematuria or other bleeding complaints    Past Medical History:   Diagnosis Date    Atrial fibrillation (HCC)     CKD (chronic kidney disease)     stage 5    DVT (deep venous thrombosis) ([facility])     ESRD (end stage renal disease) ([facility])     Hemodialysis patient ([facility])     [place] (hard of hearing)     Hx of blood clots     Hypertension        Review of Systems:  Cardiac symptoms as noted above in HPI. All others negative.  Denies fatigue, malaise, skin rash, joint pain, blurring vision, photophobia, neck pain, hemoptysis, chronic cough, nausea, vomiting, hematuria, burning micturition, BRBPR, chronic headaches.    Current Outpatient Medications   Medication Sig    NONFORMULARY     traMADol (ULTRAM ER) 100 MG extended release tablet Take 1 tablet by mouth daily as needed for Pain for up to 30 days. Max Daily Amount: 100 mg    naloxone 4 MG/0.1ML LIQD nasal spray 1 spray by Nasal route as needed for Opioid

## 2024-06-08 ENCOUNTER — ANESTHESIA EVENT (OUTPATIENT)
Dept: CARDIOTHORACIC SURGERY | Facility: HOSPITAL | Age: 79
End: 2024-06-08
Payer: MEDICARE

## 2024-06-10 ENCOUNTER — TELEPHONE (OUTPATIENT)
Age: 79
End: 2024-06-10

## 2024-06-10 NOTE — PERIOP NOTE
Instructions for your surgery at [facility]      Today's Date:  6/10/2024      Patient's Name:  Aniceto Interiano Sr           Surgery Date:  06/11/2024              Please enter the main entrance of the hospital and check-in at the  located in the lobby. Once checked in at the , you will take the elevators to the second floor, and report to the waiting room on the left. The room will say Procedure Registration.    Do NOT eat or drink anything, including candy, gum, or ice chips after midnight prior to your surgery, unless you have specific instructions from your surgeon or anesthesia provider to do so.  Brush your teeth before coming to the hospital. You may swish with water, but do not swallow.  No smoking/Vaping/E-Cigarettes 24 hours prior to the day of surgery.  No alcohol 24 hours prior to the day of surgery.  No recreational drugs for one week prior to the day of surgery.  Bring Photo ID, Insurance information, and Co-pay if required on day of surgery.  Bring in pertinent legal documents, such as, Medical Power of , DNR, Advance Directive, etc.  Leave all valuables, including money/purse, at home.  Remove all jewelry, including ALL body piercings, nail polish, acrylic nails, and makeup (including mascara); no lotions, powders, deodorant, or perfume/cologne/after shave on the skin.  Follow instruction for Hibiclens washes and CHG wipes from surgeon's office.   Glasses and dentures may be worn to the hospital. They must be removed prior to surgery. Please bring case/container for glasses or dentures.   Contact lenses should not be worn on day of surgery.   Call your doctor's office if symptoms of a cold or illness develop within 24-48 hours prior to your surgery.  Call your doctor's office if you have any questions concerning insurance or co-pays.  15. AN ADULT (relative or friend 18 years or older) MUST DRIVE YOU HOME AFTER YOUR SURGERY.  16. Please make

## 2024-06-10 NOTE — TELEPHONE ENCOUNTER
Retinal tear and detachment warning symptoms reviewed and patient instructed to call immediately if increasing floaters, flashes, or decreasing peripheral vision. Spoke to patients Karen de la rosa on 06/10/2024 at 151pm about surgery scheduled on 06/11/2024 with Dr. Alonso for Left Upper Extremity Thrombectomy (open) with possible fistulogram.     Patient instructions:   Check in at [facility] Main entrance  at 1130am.  Do not eat, drink or chew gum after midnight prior to surgery.   Only take heart and blood pressure medication with a sip of water the morning of the procedure.   Last dose of Eliquis was on 06/08/2024.   Patient instructed to have RIDE available when discharged from hospital. Patient is not allowed to drive, walk or go home using public transportation (including [employer] and Uber).   No discharge appointment. Will call daughter after surgery if patient will need a follow up.   Patient confirmed and repeated instructions.

## 2024-06-11 ENCOUNTER — ANESTHESIA (OUTPATIENT)
Dept: CARDIOTHORACIC SURGERY | Facility: HOSPITAL | Age: 79
End: 2024-06-11
Payer: MEDICARE

## 2024-06-11 ENCOUNTER — HOSPITAL ENCOUNTER (OUTPATIENT)
Facility: HOSPITAL | Age: 79
Setting detail: OUTPATIENT SURGERY
Discharge: HOME | End: 2024-06-11
Attending: SURGERY | Admitting: SURGERY
Payer: MEDICARE

## 2024-06-11 VITALS
BODY MASS INDEX: 20.27 KG/M2 | HEART RATE: 53 BPM | DIASTOLIC BLOOD PRESSURE: 70 MMHG | OXYGEN SATURATION: 97 % | SYSTOLIC BLOOD PRESSURE: 114 MMHG | HEIGHT: 75 IN | WEIGHT: 163 LBS | RESPIRATION RATE: 18 BRPM | TEMPERATURE: 98.2 F

## 2024-06-11 DIAGNOSIS — T82.898A AV FISTULA OCCLUSION, INITIAL ENCOUNTER (HCC): ICD-10-CM

## 2024-06-11 DIAGNOSIS — N18.6 ESRD (END STAGE RENAL DISEASE) (HCC): ICD-10-CM

## 2024-06-11 LAB
ANION GAP SERPL CALC-SCNC: 8 MMOL/L (ref 3–18)
BASOPHILS # BLD: 0.1 K/UL (ref 0–0.1)
BASOPHILS NFR BLD: 2 % (ref 0–2)
BUN SERPL-MCNC: 49 MG/DL (ref 7–18)
BUN/CREAT SERPL: 6 (ref 12–20)
CALCIUM SERPL-MCNC: 10.6 MG/DL (ref 8.5–10.1)
CHLORIDE SERPL-SCNC: 95 MMOL/L (ref 100–111)
CO2 SERPL-SCNC: 29 MMOL/L (ref 21–32)
CREAT SERPL-MCNC: 8.83 MG/DL (ref 0.6–1.3)
DIFFERENTIAL METHOD BLD: ABNORMAL
EOSINOPHIL # BLD: 0.1 K/UL (ref 0–0.4)
EOSINOPHIL NFR BLD: 1 % (ref 0–5)
ERYTHROCYTE [DISTWIDTH] IN BLOOD BY AUTOMATED COUNT: 13.4 % (ref 11.6–14.5)
GLUCOSE SERPL-MCNC: 89 MG/DL (ref 74–99)
HCT VFR BLD AUTO: 41.9 % (ref 36–48)
HGB BLD-MCNC: 13.5 G/DL (ref 13–16)
IMM GRANULOCYTES # BLD AUTO: 0 K/UL (ref 0–0.04)
IMM GRANULOCYTES NFR BLD AUTO: 0 % (ref 0–0.5)
LYMPHOCYTES # BLD: 3.1 K/UL (ref 0.9–3.6)
LYMPHOCYTES NFR BLD: 56 % (ref 21–52)
MCH RBC QN AUTO: 30.4 PG (ref 24–34)
MCHC RBC AUTO-ENTMCNC: 32.2 G/DL (ref 31–37)
MCV RBC AUTO: 94.4 FL (ref 78–100)
MONOCYTES # BLD: 0.3 K/UL (ref 0.05–1.2)
MONOCYTES NFR BLD: 6 % (ref 3–10)
NEUTS SEG # BLD: 1.9 K/UL (ref 1.8–8)
NEUTS SEG NFR BLD: 35 % (ref 40–73)
NRBC # BLD: 0 K/UL (ref 0–0.01)
NRBC BLD-RTO: 0 PER 100 WBC
PLATELET # BLD AUTO: 95 K/UL (ref 135–420)
PLATELET COMMENT: ABNORMAL
PMV BLD AUTO: 12 FL (ref 9.2–11.8)
POTASSIUM SERPL-SCNC: 5 MMOL/L (ref 3.5–5.5)
RBC # BLD AUTO: 4.44 M/UL (ref 4.35–5.65)
RBC MORPH BLD: ABNORMAL
SODIUM SERPL-SCNC: 132 MMOL/L (ref 136–145)
WBC # BLD AUTO: 5.5 K/UL (ref 4.6–13.2)

## 2024-06-11 PROCEDURE — 2580000003 HC RX 258: Performed by: NURSE ANESTHETIST, CERTIFIED REGISTERED

## 2024-06-11 PROCEDURE — 80048 BASIC METABOLIC PNL TOTAL CA: CPT

## 2024-06-11 PROCEDURE — 6370000000 HC RX 637 (ALT 250 FOR IP): Performed by: NURSE ANESTHETIST, CERTIFIED REGISTERED

## 2024-06-11 PROCEDURE — 85025 COMPLETE CBC W/AUTO DIFF WBC: CPT

## 2024-06-11 RX ORDER — IODIXANOL 320 MG/ML
INJECTION, SOLUTION INTRAVASCULAR
Status: DISCONTINUED
Start: 2024-06-11 | End: 2024-06-11 | Stop reason: HOSPADM

## 2024-06-11 RX ORDER — LIDOCAINE HYDROCHLORIDE 10 MG/ML
1 INJECTION, SOLUTION EPIDURAL; INFILTRATION; INTRACAUDAL; PERINEURAL
Status: DISCONTINUED | OUTPATIENT
Start: 2024-06-11 | End: 2024-06-11 | Stop reason: HOSPADM

## 2024-06-11 RX ORDER — FAMOTIDINE 20 MG/1
20 TABLET, FILM COATED ORAL ONCE
Status: COMPLETED | OUTPATIENT
Start: 2024-06-11 | End: 2024-06-11

## 2024-06-11 RX ORDER — SODIUM CHLORIDE 9 MG/ML
INJECTION, SOLUTION INTRAVENOUS CONTINUOUS
Status: DISCONTINUED | OUTPATIENT
Start: 2024-06-11 | End: 2024-06-11 | Stop reason: HOSPADM

## 2024-06-11 RX ORDER — HEPARIN SODIUM 200 [USP'U]/100ML
INJECTION, SOLUTION INTRAVENOUS
Status: DISCONTINUED
Start: 2024-06-11 | End: 2024-06-11 | Stop reason: HOSPADM

## 2024-06-11 RX ADMIN — FAMOTIDINE 20 MG: 20 TABLET ORAL at 12:54

## 2024-06-11 RX ADMIN — SODIUM CHLORIDE: 9 INJECTION, SOLUTION INTRAVENOUS at 12:54

## 2024-06-11 ASSESSMENT — PAIN - FUNCTIONAL ASSESSMENT: PAIN_FUNCTIONAL_ASSESSMENT: 0-10

## 2024-06-11 NOTE — PERIOP NOTE
Patient /Family /Designee has been informed that [facility] is not responsible for patient belongings per policy and the signed [facility] Patient Agreement document.  Personal items should be sent home or checked in with security.  Patient /Family /Designee selected the following action:                            [x]  Send personal items home with a family member or friend                                                 []  Check in personal items with security, excluding clothing                            []  Maintain personal items at the bedside, against recommendation                                 by [facility]                                   ** If patient /family /designee chooses to maintain personal items at the bedside,                                      Complete the patient belongings inventory in the EMR.

## 2024-06-11 NOTE — H&P
fistulogram, possible stent placement, possible graft revision.  Risks, benefits and alternatives were discussed with the patient including but not limited to bleeding, infection, injury to surrounding structures including nerves and/or other organs, scar tissue accumulation, need for surveillance, need for further procedures.  The patient expressed understanding after the opportunity to ask questions, and consented to the procedure.       Alejandro Agee MD  Vascular Surgeon  [facility]        Addendum delayed entry    After review of the patient's chart with the anesthesiologist, it was noted that he was recommended to undergo a TTE last week by his cardiologist. Since the patient currently has a TDC in place, it is not urgent to proceed with graft thrombectomy at this time. I spoke w/ the pt and his daughter.  Our office will work to schedule the TTE and cardiology f/u ASAP and re-schedule the graft thrombectomy within the next 1-2 weeks. They expressed understanding and are amenable to the plan.     MD [facility]  Vascular Surgeon

## 2024-06-12 ENCOUNTER — CLINICAL DOCUMENTATION (OUTPATIENT)
Age: 79
End: 2024-06-12

## 2024-06-12 NOTE — PROGRESS NOTES
Janiya from [facility] called stating pt was clotting in  both his access and his catheter. She says they are going to try to run him. I spoke to Dr. Agee and she is going to try to do a Cath exchange tomorrow.

## 2024-06-13 ENCOUNTER — HOSPITAL ENCOUNTER (INPATIENT)
Facility: HOSPITAL | Age: 79
LOS: 4 days | Discharge: HOME | DRG: 673 | End: 2024-06-21
Attending: SURGERY | Admitting: SURGERY
Payer: MEDICARE

## 2024-06-13 ENCOUNTER — ANESTHESIA EVENT (OUTPATIENT)
Dept: CARDIOTHORACIC SURGERY | Facility: HOSPITAL | Age: 79
End: 2024-06-13
Payer: MEDICARE

## 2024-06-13 DIAGNOSIS — Z01.818 PREOPERATIVE CLEARANCE: ICD-10-CM

## 2024-06-13 DIAGNOSIS — N18.6 ESRD (END STAGE RENAL DISEASE) (HCC): ICD-10-CM

## 2024-06-13 DIAGNOSIS — R06.02 SHORTNESS OF BREATH: Primary | ICD-10-CM

## 2024-06-13 PROBLEM — I48.91 ATRIAL FIBRILLATION (HCC): Status: ACTIVE | Noted: 2024-06-06

## 2024-06-13 PROBLEM — Z79.01 CHRONIC ANTICOAGULATION: Status: ACTIVE | Noted: 2024-06-13

## 2024-06-13 LAB
ALBUMIN SERPL-MCNC: 2.9 G/DL (ref 3.4–5)
ALBUMIN/GLOB SERPL: 0.6 (ref 0.8–1.7)
ALP SERPL-CCNC: 85 U/L (ref 45–117)
ALT SERPL-CCNC: 13 U/L (ref 16–61)
ANION GAP BLD CALC-SCNC: ABNORMAL (ref 10–20)
ANION GAP SERPL CALC-SCNC: 10 MMOL/L (ref 3–18)
AST SERPL-CCNC: 13 U/L (ref 10–38)
BASOPHILS # BLD: 0 K/UL (ref 0–0.1)
BASOPHILS NFR BLD: 1 % (ref 0–2)
BILIRUB SERPL-MCNC: 0.5 MG/DL (ref 0.2–1)
BUN SERPL-MCNC: 49 MG/DL (ref 7–18)
BUN/CREAT SERPL: 5 (ref 12–20)
CA-I BLD-MCNC: 1.2 MMOL/L (ref 1.12–1.32)
CALCIUM SERPL-MCNC: 8.9 MG/DL (ref 8.5–10.1)
CHLORIDE BLD-SCNC: 102 MMOL/L (ref 100–108)
CHLORIDE SERPL-SCNC: 101 MMOL/L (ref 100–111)
CO2 SERPL-SCNC: 24 MMOL/L (ref 21–32)
CREAT SERPL-MCNC: 9.5 MG/DL (ref 0.6–1.3)
CREAT UR-MCNC: 8.8 MG/DL (ref 0.6–1.3)
DIFFERENTIAL METHOD BLD: ABNORMAL
EOSINOPHIL # BLD: 0.2 K/UL (ref 0–0.4)
EOSINOPHIL NFR BLD: 3 % (ref 0–5)
ERYTHROCYTE [DISTWIDTH] IN BLOOD BY AUTOMATED COUNT: 13.2 % (ref 11.6–14.5)
GLOBULIN SER CALC-MCNC: 4.5 G/DL (ref 2–4)
GLUCOSE BLD STRIP.AUTO-MCNC: 86 MG/DL (ref 74–99)
GLUCOSE SERPL-MCNC: 102 MG/DL (ref 74–99)
HBV SURFACE AG SER QL: <0.1 INDEX
HBV SURFACE AG SER QL: NEGATIVE
HCT VFR BLD AUTO: 34.5 % (ref 36–48)
HGB BLD-MCNC: 11.3 G/DL (ref 13–16)
IMM GRANULOCYTES # BLD AUTO: 0 K/UL (ref 0–0.04)
IMM GRANULOCYTES NFR BLD AUTO: 0 % (ref 0–0.5)
LYMPHOCYTES # BLD: 3.5 K/UL (ref 0.9–3.6)
LYMPHOCYTES NFR BLD: 57 % (ref 21–52)
MCH RBC QN AUTO: 30.6 PG (ref 24–34)
MCHC RBC AUTO-ENTMCNC: 32.8 G/DL (ref 31–37)
MCV RBC AUTO: 93.5 FL (ref 78–100)
MONOCYTES # BLD: 0.5 K/UL (ref 0.05–1.2)
MONOCYTES NFR BLD: 9 % (ref 3–10)
NEUTS SEG # BLD: 1.9 K/UL (ref 1.8–8)
NEUTS SEG NFR BLD: 31 % (ref 40–73)
NRBC # BLD: 0 K/UL (ref 0–0.01)
NRBC BLD-RTO: 0 PER 100 WBC
PLATELET # BLD AUTO: 85 K/UL (ref 135–420)
PMV BLD AUTO: 12 FL (ref 9.2–11.8)
POTASSIUM BLD-SCNC: 4.6 MMOL/L (ref 3.5–5.5)
POTASSIUM SERPL-SCNC: 4.6 MMOL/L (ref 3.5–5.5)
PROT SERPL-MCNC: 7.4 G/DL (ref 6.4–8.2)
RBC # BLD AUTO: 3.69 M/UL (ref 4.35–5.65)
SODIUM BLD-SCNC: 135 MMOL/L (ref 136–145)
SODIUM SERPL-SCNC: 135 MMOL/L (ref 136–145)
WBC # BLD AUTO: 6.2 K/UL (ref 4.6–13.2)

## 2024-06-13 PROCEDURE — 76000 FLUOROSCOPY <1 HR PHYS/QHP: CPT | Performed by: SURGERY

## 2024-06-13 PROCEDURE — 86901 BLOOD TYPING SEROLOGIC RH(D): CPT

## 2024-06-13 PROCEDURE — C1752 CATH,HEMODIALYSIS,SHORT-TERM: HCPCS | Performed by: SURGERY

## 2024-06-13 PROCEDURE — 86706 HEP B SURFACE ANTIBODY: CPT

## 2024-06-13 PROCEDURE — 6360000002 HC RX W HCPCS: Performed by: SURGERY

## 2024-06-13 PROCEDURE — 36415 COLL VENOUS BLD VENIPUNCTURE: CPT

## 2024-06-13 PROCEDURE — 90935 HEMODIALYSIS ONE EVALUATION: CPT

## 2024-06-13 PROCEDURE — 76937 US GUIDE VASCULAR ACCESS: CPT | Performed by: SURGERY

## 2024-06-13 PROCEDURE — 36901 INTRO CATH DIALYSIS CIRCUIT: CPT | Performed by: SURGERY

## 2024-06-13 PROCEDURE — 86923 COMPATIBILITY TEST ELECTRIC: CPT

## 2024-06-13 PROCEDURE — C1750 CATH, HEMODIALYSIS,LONG-TERM: HCPCS | Performed by: SURGERY

## 2024-06-13 PROCEDURE — C1725 CATH, TRANSLUMIN NON-LASER: HCPCS | Performed by: SURGERY

## 2024-06-13 PROCEDURE — 6370000000 HC RX 637 (ALT 250 FOR IP): Performed by: SURGERY

## 2024-06-13 PROCEDURE — C1757 CATH, THROMBECTOMY/EMBOLECT: HCPCS | Performed by: SURGERY

## 2024-06-13 PROCEDURE — 6360000004 HC RX CONTRAST MEDICATION: Performed by: SURGERY

## 2024-06-13 PROCEDURE — G0378 HOSPITAL OBSERVATION PER HR: HCPCS

## 2024-06-13 PROCEDURE — 87340 HEPATITIS B SURFACE AG IA: CPT

## 2024-06-13 PROCEDURE — 6370000000 HC RX 637 (ALT 250 FOR IP): Performed by: PHYSICIAN ASSISTANT

## 2024-06-13 PROCEDURE — 80047 BASIC METABLC PNL IONIZED CA: CPT

## 2024-06-13 PROCEDURE — 36902 INTRO CATH DIALYSIS CIRCUIT: CPT | Performed by: SURGERY

## 2024-06-13 PROCEDURE — 80053 COMPREHEN METABOLIC PANEL: CPT

## 2024-06-13 PROCEDURE — C1769 GUIDE WIRE: HCPCS | Performed by: SURGERY

## 2024-06-13 PROCEDURE — 86900 BLOOD TYPING SEROLOGIC ABO: CPT

## 2024-06-13 PROCEDURE — 99153 MOD SED SAME PHYS/QHP EA: CPT | Performed by: SURGERY

## 2024-06-13 PROCEDURE — 2500000003 HC RX 250 WO HCPCS: Performed by: PHYSICIAN ASSISTANT

## 2024-06-13 PROCEDURE — 99152 MOD SED SAME PHYS/QHP 5/>YRS: CPT | Performed by: SURGERY

## 2024-06-13 PROCEDURE — 2500000003 HC RX 250 WO HCPCS: Performed by: SURGERY

## 2024-06-13 PROCEDURE — 86850 RBC ANTIBODY SCREEN: CPT

## 2024-06-13 PROCEDURE — 99232 SBSQ HOSP IP/OBS MODERATE 35: CPT | Performed by: INTERNAL MEDICINE

## 2024-06-13 PROCEDURE — C1753 CATH, INTRAVAS ULTRASOUND: HCPCS | Performed by: SURGERY

## 2024-06-13 PROCEDURE — 36558 INSERT TUNNELED CV CATH: CPT | Performed by: SURGERY

## 2024-06-13 PROCEDURE — 2580000003 HC RX 258: Performed by: SURGERY

## 2024-06-13 PROCEDURE — C1894 INTRO/SHEATH, NON-LASER: HCPCS | Performed by: SURGERY

## 2024-06-13 PROCEDURE — 36589 REMOVAL TUNNELED CV CATH: CPT | Performed by: SURGERY

## 2024-06-13 PROCEDURE — 85025 COMPLETE CBC W/AUTO DIFF WBC: CPT

## 2024-06-13 PROCEDURE — 2709999900 HC NON-CHARGEABLE SUPPLY: Performed by: SURGERY

## 2024-06-13 RX ORDER — PROTAMINE SULFATE 10 MG/ML
INJECTION, SOLUTION INTRAVENOUS PRN
Status: DISCONTINUED | OUTPATIENT
Start: 2024-06-13 | End: 2024-06-13 | Stop reason: HOSPADM

## 2024-06-13 RX ORDER — IODIXANOL 320 MG/ML
INJECTION, SOLUTION INTRAVASCULAR PRN
Status: DISCONTINUED | OUTPATIENT
Start: 2024-06-13 | End: 2024-06-13 | Stop reason: HOSPADM

## 2024-06-13 RX ORDER — HEPARIN SODIUM 1000 [USP'U]/ML
INJECTION, SOLUTION INTRAVENOUS; SUBCUTANEOUS PRN
Status: DISCONTINUED | OUTPATIENT
Start: 2024-06-13 | End: 2024-06-13 | Stop reason: HOSPADM

## 2024-06-13 RX ORDER — CYANOCOBALAMIN (VITAMIN B-12) 1000 MCG
1000 TABLET, EXTENDED RELEASE ORAL DAILY
Status: DISCONTINUED | OUTPATIENT
Start: 2024-06-13 | End: 2024-06-13

## 2024-06-13 RX ORDER — TRAMADOL HYDROCHLORIDE 50 MG/1
50 TABLET ORAL 4 TIMES DAILY
Status: DISCONTINUED | OUTPATIENT
Start: 2024-06-13 | End: 2024-06-15

## 2024-06-13 RX ORDER — HEPARIN SODIUM 1000 [USP'U]/ML
1000 INJECTION, SOLUTION INTRAVENOUS; SUBCUTANEOUS PRN
Status: DISCONTINUED | OUTPATIENT
Start: 2024-06-13 | End: 2024-06-14 | Stop reason: HOSPADM

## 2024-06-13 RX ORDER — AMLODIPINE BESYLATE 10 MG/1
10 TABLET ORAL DAILY
Status: DISCONTINUED | OUTPATIENT
Start: 2024-06-13 | End: 2024-06-21 | Stop reason: HOSPADM

## 2024-06-13 RX ORDER — MIDAZOLAM HYDROCHLORIDE 1 MG/ML
INJECTION INTRAMUSCULAR; INTRAVENOUS
Status: DISCONTINUED
Start: 2024-06-13 | End: 2024-06-13

## 2024-06-13 RX ORDER — FENTANYL CITRATE 50 UG/ML
INJECTION, SOLUTION INTRAMUSCULAR; INTRAVENOUS
Status: DISCONTINUED
Start: 2024-06-13 | End: 2024-06-13

## 2024-06-13 RX ORDER — CALCIUM ACETATE 667 MG/1
1 CAPSULE ORAL
Status: DISCONTINUED | OUTPATIENT
Start: 2024-06-13 | End: 2024-06-21 | Stop reason: HOSPADM

## 2024-06-13 RX ORDER — PROMETHAZINE HYDROCHLORIDE 12.5 MG/1
12.5 TABLET ORAL EVERY 6 HOURS PRN
Status: DISCONTINUED | OUTPATIENT
Start: 2024-06-13 | End: 2024-06-21 | Stop reason: HOSPADM

## 2024-06-13 RX ORDER — VITAMIN B COMPLEX
1000 TABLET ORAL DAILY
Status: DISCONTINUED | OUTPATIENT
Start: 2024-06-13 | End: 2024-06-21 | Stop reason: HOSPADM

## 2024-06-13 RX ORDER — HEPARIN SODIUM 200 [USP'U]/100ML
INJECTION, SOLUTION INTRAVENOUS
Status: DISCONTINUED
Start: 2024-06-13 | End: 2024-06-13

## 2024-06-13 RX ORDER — FENTANYL CITRATE 50 UG/ML
INJECTION, SOLUTION INTRAMUSCULAR; INTRAVENOUS PRN
Status: DISCONTINUED | OUTPATIENT
Start: 2024-06-13 | End: 2024-06-13 | Stop reason: HOSPADM

## 2024-06-13 RX ORDER — MULTIVIT WITH MINERALS/LUTEIN
500 TABLET ORAL DAILY
Status: DISCONTINUED | OUTPATIENT
Start: 2024-06-13 | End: 2024-06-21 | Stop reason: HOSPADM

## 2024-06-13 RX ORDER — HEPARIN SODIUM 1000 [USP'U]/ML
INJECTION, SOLUTION INTRAVENOUS; SUBCUTANEOUS
Status: DISCONTINUED
Start: 2024-06-13 | End: 2024-06-13

## 2024-06-13 RX ORDER — DIPHENHYDRAMINE HYDROCHLORIDE 50 MG/ML
INJECTION INTRAMUSCULAR; INTRAVENOUS
Status: DISCONTINUED
Start: 2024-06-13 | End: 2024-06-13

## 2024-06-13 RX ORDER — CEFAZOLIN SODIUM 1 G/3ML
INJECTION, POWDER, FOR SOLUTION INTRAMUSCULAR; INTRAVENOUS
Status: DISCONTINUED
Start: 2024-06-13 | End: 2024-06-13

## 2024-06-13 RX ORDER — ACETAMINOPHEN 325 MG/1
650 TABLET ORAL EVERY 8 HOURS PRN
Status: DISCONTINUED | OUTPATIENT
Start: 2024-06-13 | End: 2024-06-20 | Stop reason: SDUPTHER

## 2024-06-13 RX ORDER — LANOLIN ALCOHOL/MO/W.PET/CERES
1000 CREAM (GRAM) TOPICAL DAILY
Status: DISCONTINUED | OUTPATIENT
Start: 2024-06-13 | End: 2024-06-21 | Stop reason: HOSPADM

## 2024-06-13 RX ORDER — SODIUM CHLORIDE 9 MG/ML
INJECTION, SOLUTION INTRAVENOUS PRN
Status: DISCONTINUED | OUTPATIENT
Start: 2024-06-13 | End: 2024-06-21 | Stop reason: HOSPADM

## 2024-06-13 RX ORDER — DIPHENHYDRAMINE HYDROCHLORIDE 50 MG/ML
INJECTION INTRAMUSCULAR; INTRAVENOUS PRN
Status: DISCONTINUED | OUTPATIENT
Start: 2024-06-13 | End: 2024-06-13 | Stop reason: HOSPADM

## 2024-06-13 RX ORDER — MIDAZOLAM HYDROCHLORIDE 1 MG/ML
INJECTION INTRAMUSCULAR; INTRAVENOUS PRN
Status: DISCONTINUED | OUTPATIENT
Start: 2024-06-13 | End: 2024-06-13 | Stop reason: HOSPADM

## 2024-06-13 RX ADMIN — ACETAMINOPHEN 325MG 650 MG: 325 TABLET ORAL at 15:14

## 2024-06-13 RX ADMIN — CALCIUM ACETATE 667 MG: 667 CAPSULE ORAL at 17:26

## 2024-06-13 RX ADMIN — CYANOCOBALAMIN TAB 1000 MCG 1000 MCG: 1000 TAB at 17:26

## 2024-06-13 RX ADMIN — TRAMADOL HYDROCHLORIDE 50 MG: 50 TABLET ORAL at 21:08

## 2024-06-13 ASSESSMENT — PAIN SCALES - GENERAL
PAINLEVEL_OUTOF10: 0
PAINLEVEL_OUTOF10: 8
PAINLEVEL_OUTOF10: 0
PAINLEVEL_OUTOF10: 3
PAINLEVEL_OUTOF10: 8
PAINLEVEL_OUTOF10: 0

## 2024-06-13 ASSESSMENT — PAIN DESCRIPTION - DESCRIPTORS
DESCRIPTORS: ACHING;DISCOMFORT
DESCRIPTORS: ACHING;DISCOMFORT
DESCRIPTORS: ACHING

## 2024-06-13 ASSESSMENT — PAIN DESCRIPTION - LOCATION
LOCATION: ARM
LOCATION: FOOT
LOCATION: NECK

## 2024-06-13 ASSESSMENT — PAIN - FUNCTIONAL ASSESSMENT: PAIN_FUNCTIONAL_ASSESSMENT: PREVENTS OR INTERFERES SOME ACTIVE ACTIVITIES AND ADLS

## 2024-06-13 ASSESSMENT — PAIN DESCRIPTION - FREQUENCY: FREQUENCY: CONTINUOUS

## 2024-06-13 ASSESSMENT — PAIN DESCRIPTION - ONSET: ONSET: ON-GOING

## 2024-06-13 ASSESSMENT — PAIN DESCRIPTION - ORIENTATION
ORIENTATION: LEFT
ORIENTATION: RIGHT;LEFT

## 2024-06-13 ASSESSMENT — PAIN DESCRIPTION - PAIN TYPE: TYPE: CHRONIC PAIN

## 2024-06-13 NOTE — PRE SEDATION
Sedation Plan  ASA: class 4 - patient with severe systemic disease that is a constant threat to life         Sedation plan: local anesthesia and level 2-1: moderate/analgesia (conscious sedation)    Risks, benefits, and alternatives discussed with patient.        Immediate reassessment prior to sedation:  Patient's status reviewed and vital signs assessed; acceptable to perform procedure and proceed to administer sedation as planned.

## 2024-06-13 NOTE — PLAN OF CARE
INTERVENTION:  HEMODYNAMIC STABILIZATION  MAINTAIN BP WNL WHILE ON HD.    INTERVENTION:  FLUID MANAGEMENT  WILL ATTEMPT 1500 ML TOTAL FLUID REMOVAL AS TOLERATED.    INTERVENTION:  METABOLIC/ELECTROLYTE MANAGEMENT  2.0 POTASSIUM 2.5 CALCIUM DIALYSATE USED WITH HD TODAY.    INTERVENTION:  HEMODIALYSIS ACCESS SITE MANAGEMENT  LEFT FEMORAL CVC ACCESSED USING ASEPTIC TECHNIQUE.    GOAL:  SIGNS AND SYMPTOMS OF LISTED POTENTIAL PROBLEMS WILL BE ABSENT OR MANAGEABLE.    OUTCOME:  PROGRESSING.    HD PLANNED FOR 3 HOURS TODAY.        Problem: Chronic Conditions and Co-morbidities  Goal: Patient's chronic conditions and co-morbidity symptoms are monitored and maintained or improved  Outcome: Progressing  Flowsheets (Taken 6/13/2024 1407)  Care Plan - Patient's Chronic Conditions and Co-Morbidity Symptoms are Monitored and Maintained or Improved:   Monitor and assess patient's chronic conditions and comorbid symptoms for stability, deterioration, or improvement   Collaborate with multidisciplinary team to address chronic and comorbid conditions and prevent exacerbation or deterioration   Update acute care plan with appropriate goals if chronic or comorbid symptoms are exacerbated and prevent overall improvement and discharge

## 2024-06-13 NOTE — PROGRESS NOTES
Consultation noted for ESRD on HD   HD ordered   Dialysis nursing aware  Access new TDC     Please call with questions    Angel Magallanes MD FASN  Cell [phone]  Pager: [phone]  Fax   [phone]

## 2024-06-13 NOTE — PROGRESS NOTES
Cardiology Progress Note    Admit Date: 6/13/2024  Attending Cardiologist: Dr. Roslyn MACIAS  Preoperative risk stratification, due for left upper extremity thrombectomy with possible fistulogram  Atrial fibrillation  Hypertension - normotensive  Coronary risk equivalent end-stage renal disease on hemodialysis  History of DVT  Chronic anticoagulation    PLAN  Patient is at least medium to high risk due to history of congestive heart failure as well as end-stage renal disease on hemodialysis, 2D echo is pending.  Patient may proceed without further testing if willing to accept risk and benefits if surgery is thought to be more urgent for better result.    Monitor blood pressure, adjust antihypertensive medications as necessary.  Continue Norvasc 10 mg p.o. daily  Statin if can tolerate prior to discharge.  Eliquis 2.5 mg p.o. twice daily for primary prevention of stroke in context of atrial fibrillation prior to discharge    Thank you for this consultation and for allowing us to participate in this patient's care.    Primary Cardiologist Dr. ALEXANDRU Martinez    Subjective:     Denies chest pain  Denies shortness of breath  Denies abdominal pain    Objective:      Patient Vitals for the past 8 hrs:   Temp Pulse Resp BP SpO2   06/13/24 1545 -- 51 -- 120/75 --   06/13/24 1530 -- 50 -- 117/79 --   06/13/24 1515 -- (!) 48 -- 122/69 --   06/13/24 1500 -- 55 -- 115/70 --   06/13/24 1445 -- (!) 48 -- 119/76 --   06/13/24 1430 -- (!) 48 -- 114/69 --   06/13/24 1415 -- (!) 49 -- 117/69 --   06/13/24 1400 -- (!) 48 -- 121/83 --   06/13/24 1353 -- 57 18 125/77 --   06/13/24 1346 97.1 °F (36.2 °C) 50 18 136/83 --   06/13/24 1308 97.7 °F (36.5 °C) 64 16 (!) 161/77 98 %   06/13/24 1245 -- 50 13 121/75 100 %   06/13/24 1230 -- 51 14 118/77 99 %   06/13/24 1215 -- 50 10 124/74 99 %   06/13/24 1155 -- 61 (!) 9 133/86 95 %         Patient Vitals for the past 96 hrs:   Weight   06/13/24 1346 79.9 kg (176 lb 1.6 oz)           Intake/Output

## 2024-06-13 NOTE — PROGRESS NOTES
Received pre HD report from  TAMARA Garcia RN.      Pt in bed, A+O x4, no s/s of distress noted.      Accessed Left Femoral Trialysis w/o complications.  Tx initiated at 1353.      CVC flowing with difficulty d/t inability of access to maintain pressures w/o alarming, after reversing CVC lines as arterial pressure alarming.      For hemodynamic stability UF goal 1500 ml.  Offered assistance with repositioning every 2 hours.  Vascular access visible at all times during treatment, line connections intact at all times.        BFR:  300-350, patient constantly moving and bending at the waist causing high pressure alarming.  Encouraged patient to try to keep still during treatment w/no success patient continues to try to bend at the waist.  Nephrologist notified.      Tx completed 13 minutes early at patient request at 1652, tolerated well 872 mL removed.  De-accessed per protocol.      Heparin indwell 1.2ml in arterial, and 1.2ml in venous catheter.      Unit nurse TAMARA Garcia, RN given report.

## 2024-06-13 NOTE — PLAN OF CARE
Problem: Pain  Goal: Verbalizes/displays adequate comfort level or baseline comfort level  Outcome: Progressing     Problem: Discharge Planning  Goal: Discharge to home or other facility with appropriate resources  Outcome: Progressing  Flowsheets (Taken 6/13/2024 1300)  Discharge to home or other facility with appropriate resources: Identify barriers to discharge with patient and caregiver     Problem: Chronic Conditions and Co-morbidities  Goal: Patient's chronic conditions and co-morbidity symptoms are monitored and maintained or improved  6/13/2024 1807 by Zuleyka Garcia RN  Outcome: Progressing  6/13/2024 1407 by Alma Guzman RN  Outcome: Progressing  Flowsheets  Taken 6/13/2024 1407 by Alma Guzman, RN  Care Plan - Patient's Chronic Conditions and Co-Morbidity Symptoms are Monitored and Maintained or Improved:   Monitor and assess patient's chronic conditions and comorbid symptoms for stability, deterioration, or improvement   Collaborate with multidisciplinary team to address chronic and comorbid conditions and prevent exacerbation or deterioration   Update acute care plan with appropriate goals if chronic or comorbid symptoms are exacerbated and prevent overall improvement and discharge  Taken 6/13/2024 1300 by Zuleyka Garcia RN  Care Plan - Patient's Chronic Conditions and Co-Morbidity Symptoms are Monitored and Maintained or Improved:   Monitor and assess patient's chronic conditions and comorbid symptoms for stability, deterioration, or improvement   Collaborate with multidisciplinary team to address chronic and comorbid conditions and prevent exacerbation or deterioration     Problem: Safety - Adult  Goal: Free from fall injury  Outcome: Progressing

## 2024-06-13 NOTE — H&P
[facility]       History:   79 y/o M with ESRD and a thrombosed LUE AVG. He has a thrombosed AVG. He had HD yesterday however it took much longer than normal due to high pressures.         Past Medical History:   Diagnosis Date    Atrial fibrillation (HCC)     DVT (deep venous thrombosis) (HCC)     ESRD (end stage renal disease) (HCC)     Hemodialysis patient (HCC)     [place] (hard of hearing)     Hx of blood clots     Hypertension      Past Surgical History:   Procedure Laterality Date    DIALYSIS FISTULA CREATION Left 4/19/2024    LEFT UPPER EXTREMITY BRACHIAL-AXILLARY ARTERIOVENOUS GRAFT CREATION performed by Alejandro Agee MD at [facility] CARDIAC SURGERY    EYE SURGERY      INVASIVE VASCULAR N/A 1/30/2024    Ultrasound guided vascular access performed by Serjio Anaya MD at [facility] CARDIAC CATH LAB    INVASIVE VASCULAR N/A 4/25/2024    Tunnel dialysis catheter exchange performed by Ricky Martinez MD at [facility] CARDIAC CATH LAB    LEG SURGERY Left 12/15/2023    LEFT LEG WOUND DEBRIDEMENT WITH SKIN GRAFT APPLICATION WITH KERECIS performed by Serjio Anaya MD at [facility] CARDIAC SURGERY    PORT SURGERY N/A 4/19/2024    TUNNELED DIALYSIS CATHETER EXCHANGE; C-ARM performed by Alejandro Agee MD at [facility] CARDIAC SURGERY    VASCULAR SURGERY       No current facility-administered medications on file prior to encounter.     Current Outpatient Medications on File Prior to Encounter   Medication Sig Dispense Refill    VELPHORO 500 MG CHEW chewable tablet CHEW AND SWALLOW 1 TABLET BY MOUTH THREE TIMES DAILY WITH MEALS 270 tablet 1    apixaban (ELIQUIS) 2.5 MG TABS tablet Take 1 tablet by mouth 2 times daily 180 tablet 3    NONFORMULARY       traMADol (ULTRAM ER) 100 MG extended release tablet Take 1 tablet by mouth daily as needed for Pain for up to 30 days. Max Daily Amount: 100 mg (Patient not taking: Reported on 6/11/2024) 30 tablet 0    naloxone 4 MG/0.1ML LIQD nasal spray 1  replacement. He would be amenable to thigh/groin placement if necessary.   LUE fistulogram with thrombectomy and possible intervention.   Risks, benefits and alternatives were discussed with the patient including but not limited to bleeding, infection, injury to surrounding structures including nerves and/or other organs, scar tissue accumulation, need for surveillance, need for further procedures, venous fibrosis/scarring, DVT, pneumothorax with need for chest tube placement, revision/replacement of the catheter to maintain function for dialysis.  The patient expressed understanding after the opportunity to ask questions, and consented to the procedure.         Alejandro Agee MD  Vascular Surgeon  [facility]

## 2024-06-13 NOTE — BRIEF OP NOTE
Brief Postoperative Note      Patient: Aniceto Interiano Sr  YOB: 1945  MRN: [id number]    Date of Procedure: 6/13/2024    Pre-Op Diagnosis Codes:     * ESRD (end stage renal disease) ([facility]) [N18.6]  Thrombosed LUE brachial axillary AVG  Malfunctioning LIJ TDC    Post-Op Diagnosis: Same        Procedures:   - U/s guided access of the L CFV and placement of a 4 F microcatheter for IV access  - U/s guided access of the LUE AVG (two separate access points)  - Balloon angioplasty of the AVG with a 4mm and 7mm [place] balloon  - Balloon thrombectomy of the LUE AVG with a 4mm PTA balloon  - Complete fistulogram with central venogram  - Balloon angioplasty of L innominate vein stenosis with a 7 x 40 PTA balloon, 9 x 40 Athletis balloon, 10 x 40 Athletis balloon, and 12 x 20 Athletis balloon.   - Removal of tunneled dialysis catheter from the LIJ vein  - U/s guided access of the R CFV  - Intravascular ultrasound of the L axillary, subclavian, and innominate vein  - Placement of a temporary HD Trialysis catheter in the L CFV    Surgeon(s):  Alejandro Agee MD    Assistant:  * No surgical staff found *    Anesthesia: Local and IV sedation    Estimated Blood Loss (mL): 250mL    Complications: None    Specimens:   * No specimens in log *    Implants:  * No implants in log *      Drains: * No LDAs found *    Findings:  Infection Present At Time Of Surgery (PATOS) (choose all levels that have infection present):  No infection present  Other Findings: 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 Improved w/ PTA to approx 40-50%  Occluded LUE AVG. Apparent stenosis within the graft in the central segment, which was present on imaging at the time of access. Patent arterial anastomosis without stenosis. Patent venous anastomosis without apparent stenosis. Unable to clear all of the thrombus from within the graft.   L groin temp catheter with brisk flush and aspiration from all ports.     Disposition: to recovery

## 2024-06-13 NOTE — PROGRESS NOTES
Vascular Surgery    S/p attempted L AVG thrombectomy without success. L innominate vein stenosis was also identified, which was likely the cause of graft thrombosis, which was treated with balloon angioplasty.   Placement of a temp HD catheter in the R groin was performed.     Appreciate cardiology input and eval. Due to med-high risk, I'll plan for placement of a TDC tomorrow and will wait on LUE graft revision until after the pt has undergone a TTE and possible stress test.  If possible, will still attempt to salvage the LUE AVG.     I saw pt this afternoon in HD and he was sleeping. Will update pt and his daughter in the AM.     MD [facility]  Vascular Surgeon

## 2024-06-13 NOTE — PROGRESS NOTES
TRANSFER - OUT REPORT:    Verbal report given to ORI Gardiner on Aniceto Interiano Sr  being transferred to 2302 for routine progression of patient care       Report consisted of patient's Situation, Background, Assessment and   Recommendations(SBAR).     Information from the following report(s) Nurse Handoff Report was reviewed with the receiving nurse.           Lines:       Patient's daughter contacted and advised of transfer to 2302    Opportunity for questions and clarification was provided.      Patient transported with:  Registered Nurse: ORI Anderson

## 2024-06-13 NOTE — PROGRESS NOTES
1235- Telephone report given to ORI Gardiner (receiving nurse) by ORI NELSON (transferring nurse). Report included the following information SBAR, Procedure Summary, MAR, and Cardiac Rhythm AFIB .     1300- Patient transported on floor via bed from cath holding. Assessment performed per flowsheets. Patient oriented to room and call bell within reach.    1313- Report given to ORI Marquez in dialysis. Transport to be placed.    1340- Patient transported off floor via bed to dialysis.    1713- Report received from Alma REHMAN in dialysis - 800mL removed, /83, HR 57. Transport in progress.    1715- Patient transported back to unit via bed from dialysis. Reassessment performed per flowsheets. Patient oriented to room and call bell within reach.    1759- Phlebotomy informed this RN that Patient refused blood draw.    1920- Bedside and Verbal shift change report given to ORI Burgos (oncoming nurse) by ORI Gardiner (offgoing nurse). Report included the following information SBAR, Intake/Output, MAR, Recent Results, and Cardiac Rhythm AFIB/González .

## 2024-06-13 NOTE — PROGRESS NOTES
TRANSFER - OUT REPORT:    Verbal report given to Shasta MORENO on Aniceto Interiano Sr  being transferred to Vascular lab  for ordered procedure       Report consisted of patient's Situation, Background, Assessment and   Recommendations(SBAR).     Information from the following report(s) Nurse Handoff Report, Adult Overview, Intake/Output, MAR, Recent Results, Cardiac Rhythm Sinus González, Pre Procedure Checklist, Procedure Verification, and Neuro Assessment was reviewed with the receiving nurse.           Lines:   Tunneled Hemodialysis Catheter Left Subclavian (Active)        Opportunity for questions and clarification was provided.      Patient transported with:  Tech

## 2024-06-14 ENCOUNTER — ANESTHESIA (OUTPATIENT)
Dept: CARDIOTHORACIC SURGERY | Facility: HOSPITAL | Age: 79
End: 2024-06-14
Payer: MEDICARE

## 2024-06-14 ENCOUNTER — APPOINTMENT (OUTPATIENT)
Facility: HOSPITAL | Age: 79
DRG: 673 | End: 2024-06-14
Attending: SURGERY
Payer: MEDICARE

## 2024-06-14 LAB
ALBUMIN SERPL-MCNC: 2.9 G/DL (ref 3.4–5)
ALBUMIN/GLOB SERPL: 0.7 (ref 0.8–1.7)
ALP SERPL-CCNC: 84 U/L (ref 45–117)
ALT SERPL-CCNC: 14 U/L (ref 16–61)
ANION GAP SERPL CALC-SCNC: 9 MMOL/L (ref 3–18)
APTT PPP: 31.3 SEC (ref 23–36.4)
AST SERPL-CCNC: 11 U/L (ref 10–38)
BASOPHILS # BLD: 0 K/UL (ref 0–0.1)
BASOPHILS NFR BLD: 1 % (ref 0–2)
BILIRUB SERPL-MCNC: 0.7 MG/DL (ref 0.2–1)
BUN SERPL-MCNC: 35 MG/DL (ref 7–18)
BUN/CREAT SERPL: 4 (ref 12–20)
CALCIUM SERPL-MCNC: 9.5 MG/DL (ref 8.5–10.1)
CHLORIDE SERPL-SCNC: 101 MMOL/L (ref 100–111)
CO2 SERPL-SCNC: 27 MMOL/L (ref 21–32)
CREAT SERPL-MCNC: 7.94 MG/DL (ref 0.6–1.3)
DIFFERENTIAL METHOD BLD: ABNORMAL
EOSINOPHIL # BLD: 0.1 K/UL (ref 0–0.4)
EOSINOPHIL NFR BLD: 2 % (ref 0–5)
ERYTHROCYTE [DISTWIDTH] IN BLOOD BY AUTOMATED COUNT: 13.3 % (ref 11.6–14.5)
GLOBULIN SER CALC-MCNC: 4.4 G/DL (ref 2–4)
GLUCOSE SERPL-MCNC: 91 MG/DL (ref 74–99)
HBV SURFACE AB SER QL IA: NEGATIVE
HBV SURFACE AB SERPL IA-ACNC: 8.74 MIU/ML
HCT VFR BLD AUTO: 35.3 % (ref 36–48)
HEP BS AB COMMENT: ABNORMAL
HGB BLD-MCNC: 11.3 G/DL (ref 13–16)
HISTORY CHECK: NORMAL
IMM GRANULOCYTES # BLD AUTO: 0 K/UL (ref 0–0.04)
IMM GRANULOCYTES NFR BLD AUTO: 0 % (ref 0–0.5)
INR PPP: 1.2 (ref 0.9–1.1)
LYMPHOCYTES # BLD: 2.2 K/UL (ref 0.9–3.6)
LYMPHOCYTES NFR BLD: 43 % (ref 21–52)
MCH RBC QN AUTO: 30.8 PG (ref 24–34)
MCHC RBC AUTO-ENTMCNC: 32 G/DL (ref 31–37)
MCV RBC AUTO: 96.2 FL (ref 78–100)
MONOCYTES # BLD: 0.6 K/UL (ref 0.05–1.2)
MONOCYTES NFR BLD: 12 % (ref 3–10)
NEUTS SEG # BLD: 2.2 K/UL (ref 1.8–8)
NEUTS SEG NFR BLD: 43 % (ref 40–73)
NRBC # BLD: 0 K/UL (ref 0–0.01)
NRBC BLD-RTO: 0 PER 100 WBC
PLATELET # BLD AUTO: 85 K/UL (ref 135–420)
PMV BLD AUTO: 12.5 FL (ref 9.2–11.8)
POTASSIUM SERPL-SCNC: 4.8 MMOL/L (ref 3.5–5.5)
PROT SERPL-MCNC: 7.3 G/DL (ref 6.4–8.2)
PROTHROMBIN TIME: 15.3 SEC (ref 11.9–14.9)
RBC # BLD AUTO: 3.67 M/UL (ref 4.35–5.65)
SODIUM SERPL-SCNC: 137 MMOL/L (ref 136–145)
WBC # BLD AUTO: 5.2 K/UL (ref 4.6–13.2)

## 2024-06-14 PROCEDURE — C1750 CATH, HEMODIALYSIS,LONG-TERM: HCPCS | Performed by: SURGERY

## 2024-06-14 PROCEDURE — 3700000001 HC ADD 15 MINUTES (ANESTHESIA): Performed by: SURGERY

## 2024-06-14 PROCEDURE — 05743ZZ DILATION OF LEFT INNOMINATE VEIN, PERCUTANEOUS APPROACH: ICD-10-PCS | Performed by: SURGERY

## 2024-06-14 PROCEDURE — 90935 HEMODIALYSIS ONE EVALUATION: CPT

## 2024-06-14 PROCEDURE — G0378 HOSPITAL OBSERVATION PER HR: HCPCS

## 2024-06-14 PROCEDURE — 3600000012 HC SURGERY LEVEL 2 ADDTL 15MIN: Performed by: SURGERY

## 2024-06-14 PROCEDURE — 6360000002 HC RX W HCPCS: Performed by: SURGERY

## 2024-06-14 PROCEDURE — B54NZZ3 ULTRASONOGRAPHY OF LEFT UPPER EXTREMITY VEINS, INTRAVASCULAR: ICD-10-PCS | Performed by: SURGERY

## 2024-06-14 PROCEDURE — 2580000003 HC RX 258: Performed by: NURSE ANESTHETIST, CERTIFIED REGISTERED

## 2024-06-14 PROCEDURE — 7100000000 HC PACU RECOVERY - FIRST 15 MIN: Performed by: SURGERY

## 2024-06-14 PROCEDURE — 7100000001 HC PACU RECOVERY - ADDTL 15 MIN: Performed by: SURGERY

## 2024-06-14 PROCEDURE — 96374 THER/PROPH/DIAG INJ IV PUSH: CPT

## 2024-06-14 PROCEDURE — C1894 INTRO/SHEATH, NON-LASER: HCPCS | Performed by: SURGERY

## 2024-06-14 PROCEDURE — G0378 HOSPITAL OBSERVATION PER HR: HCPCS | Performed by: PHYSICIAN ASSISTANT

## 2024-06-14 PROCEDURE — 36415 COLL VENOUS BLD VENIPUNCTURE: CPT

## 2024-06-14 PROCEDURE — 2500000003 HC RX 250 WO HCPCS: Performed by: NURSE ANESTHETIST, CERTIFIED REGISTERED

## 2024-06-14 PROCEDURE — 85610 PROTHROMBIN TIME: CPT

## 2024-06-14 PROCEDURE — 6360000002 HC RX W HCPCS: Performed by: NURSE ANESTHETIST, CERTIFIED REGISTERED

## 2024-06-14 PROCEDURE — 3700000000 HC ANESTHESIA ATTENDED CARE: Performed by: SURGERY

## 2024-06-14 PROCEDURE — 6370000000 HC RX 637 (ALT 250 FOR IP): Performed by: PHYSICIAN ASSISTANT

## 2024-06-14 PROCEDURE — 2720000010 HC SURG SUPPLY STERILE: Performed by: SURGERY

## 2024-06-14 PROCEDURE — 0JHN3XZ INSERTION OF TUNNELED VASCULAR ACCESS DEVICE INTO RIGHT LOWER LEG SUBCUTANEOUS TISSUE AND FASCIA, PERCUTANEOUS APPROACH: ICD-10-PCS | Performed by: SURGERY

## 2024-06-14 PROCEDURE — 6360000002 HC RX W HCPCS

## 2024-06-14 PROCEDURE — 0JPT3XZ REMOVAL OF TUNNELED VASCULAR ACCESS DEVICE FROM TRUNK SUBCUTANEOUS TISSUE AND FASCIA, PERCUTANEOUS APPROACH: ICD-10-PCS | Performed by: SURGERY

## 2024-06-14 PROCEDURE — 85730 THROMBOPLASTIN TIME PARTIAL: CPT

## 2024-06-14 PROCEDURE — C1769 GUIDE WIRE: HCPCS | Performed by: SURGERY

## 2024-06-14 PROCEDURE — 99232 SBSQ HOSP IP/OBS MODERATE 35: CPT | Performed by: INTERNAL MEDICINE

## 2024-06-14 PROCEDURE — 85025 COMPLETE CBC W/AUTO DIFF WBC: CPT

## 2024-06-14 PROCEDURE — B342ZZ3 ULTRASONOGRAPHY OF LEFT SUBCLAVIAN ARTERY, INTRAVASCULAR: ICD-10-PCS | Performed by: SURGERY

## 2024-06-14 PROCEDURE — 2709999900 HC NON-CHARGEABLE SUPPLY: Performed by: SURGERY

## 2024-06-14 PROCEDURE — 3600000002 HC SURGERY LEVEL 2 BASE: Performed by: SURGERY

## 2024-06-14 PROCEDURE — B51W1ZZ FLUOROSCOPY OF DIALYSIS SHUNT/FISTULA USING LOW OSMOLAR CONTRAST: ICD-10-PCS | Performed by: SURGERY

## 2024-06-14 PROCEDURE — 2500000003 HC RX 250 WO HCPCS: Performed by: SURGERY

## 2024-06-14 PROCEDURE — 5A1D70Z PERFORMANCE OF URINARY FILTRATION, INTERMITTENT, LESS THAN 6 HOURS PER DAY: ICD-10-PCS | Performed by: SURGERY

## 2024-06-14 PROCEDURE — 80053 COMPREHEN METABOLIC PANEL: CPT

## 2024-06-14 PROCEDURE — 2500000003 HC RX 250 WO HCPCS: Performed by: PHYSICIAN ASSISTANT

## 2024-06-14 PROCEDURE — 94761 N-INVAS EAR/PLS OXIMETRY MLT: CPT

## 2024-06-14 PROCEDURE — 02HV33Z INSERTION OF INFUSION DEVICE INTO SUPERIOR VENA CAVA, PERCUTANEOUS APPROACH: ICD-10-PCS | Performed by: SURGERY

## 2024-06-14 RX ORDER — IODIXANOL 320 MG/ML
INJECTION, SOLUTION INTRAVASCULAR
Status: DISPENSED
Start: 2024-06-14 | End: 2024-06-14

## 2024-06-14 RX ORDER — HEPARIN SODIUM 200 [USP'U]/100ML
INJECTION, SOLUTION INTRAVENOUS CONTINUOUS PRN
Status: COMPLETED | OUTPATIENT
Start: 2024-06-14 | End: 2024-06-14

## 2024-06-14 RX ORDER — PROPOFOL 10 MG/ML
INJECTION, EMULSION INTRAVENOUS PRN
Status: DISCONTINUED | OUTPATIENT
Start: 2024-06-14 | End: 2024-06-14 | Stop reason: SDUPTHER

## 2024-06-14 RX ORDER — SODIUM CHLORIDE 0.9 % (FLUSH) 0.9 %
5-40 SYRINGE (ML) INJECTION PRN
Status: DISCONTINUED | OUTPATIENT
Start: 2024-06-14 | End: 2024-06-14 | Stop reason: HOSPADM

## 2024-06-14 RX ORDER — DEXTROSE MONOHYDRATE 100 MG/ML
INJECTION, SOLUTION INTRAVENOUS CONTINUOUS PRN
Status: DISCONTINUED | OUTPATIENT
Start: 2024-06-14 | End: 2024-06-14 | Stop reason: HOSPADM

## 2024-06-14 RX ORDER — LIDOCAINE HYDROCHLORIDE 20 MG/ML
INJECTION, SOLUTION EPIDURAL; INFILTRATION; INTRACAUDAL; PERINEURAL PRN
Status: DISCONTINUED | OUTPATIENT
Start: 2024-06-14 | End: 2024-06-14 | Stop reason: SDUPTHER

## 2024-06-14 RX ORDER — HEPARIN SODIUM 5000 [USP'U]/ML
INJECTION, SOLUTION INTRAVENOUS; SUBCUTANEOUS PRN
Status: DISCONTINUED | OUTPATIENT
Start: 2024-06-14 | End: 2024-06-14 | Stop reason: ALTCHOICE

## 2024-06-14 RX ORDER — NALOXONE HYDROCHLORIDE 0.4 MG/ML
INJECTION, SOLUTION INTRAMUSCULAR; INTRAVENOUS; SUBCUTANEOUS PRN
Status: DISCONTINUED | OUTPATIENT
Start: 2024-06-14 | End: 2024-06-14 | Stop reason: HOSPADM

## 2024-06-14 RX ORDER — BUPIVACAINE HYDROCHLORIDE 2.5 MG/ML
INJECTION, SOLUTION EPIDURAL; INFILTRATION; INTRACAUDAL
Status: DISPENSED
Start: 2024-06-14 | End: 2024-06-14

## 2024-06-14 RX ORDER — FENTANYL CITRATE 50 UG/ML
25 INJECTION, SOLUTION INTRAMUSCULAR; INTRAVENOUS EVERY 5 MIN PRN
Status: COMPLETED | OUTPATIENT
Start: 2024-06-14 | End: 2024-06-14

## 2024-06-14 RX ORDER — HEPARIN SODIUM 200 [USP'U]/100ML
INJECTION, SOLUTION INTRAVENOUS
Status: DISPENSED
Start: 2024-06-14 | End: 2024-06-14

## 2024-06-14 RX ORDER — FENTANYL CITRATE 50 UG/ML
INJECTION, SOLUTION INTRAMUSCULAR; INTRAVENOUS
Status: COMPLETED
Start: 2024-06-14 | End: 2024-06-14

## 2024-06-14 RX ORDER — HEPARIN SODIUM 5000 [USP'U]/ML
INJECTION, SOLUTION INTRAVENOUS; SUBCUTANEOUS
Status: DISPENSED
Start: 2024-06-14 | End: 2024-06-14

## 2024-06-14 RX ORDER — CEFAZOLIN SODIUM 1 G/3ML
INJECTION, POWDER, FOR SOLUTION INTRAMUSCULAR; INTRAVENOUS PRN
Status: DISCONTINUED | OUTPATIENT
Start: 2024-06-14 | End: 2024-06-14 | Stop reason: SDUPTHER

## 2024-06-14 RX ORDER — ONDANSETRON 2 MG/ML
4 INJECTION INTRAMUSCULAR; INTRAVENOUS
Status: DISCONTINUED | OUTPATIENT
Start: 2024-06-14 | End: 2024-06-14 | Stop reason: HOSPADM

## 2024-06-14 RX ORDER — SODIUM CHLORIDE 0.9 % (FLUSH) 0.9 %
5-40 SYRINGE (ML) INJECTION EVERY 12 HOURS SCHEDULED
Status: DISCONTINUED | OUTPATIENT
Start: 2024-06-14 | End: 2024-06-14 | Stop reason: HOSPADM

## 2024-06-14 RX ORDER — GLUCAGON 1 MG
1 KIT INJECTION PRN
Status: DISCONTINUED | OUTPATIENT
Start: 2024-06-14 | End: 2024-06-14 | Stop reason: HOSPADM

## 2024-06-14 RX ORDER — SODIUM CHLORIDE 9 MG/ML
INJECTION, SOLUTION INTRAVENOUS PRN
Status: DISCONTINUED | OUTPATIENT
Start: 2024-06-14 | End: 2024-06-14 | Stop reason: HOSPADM

## 2024-06-14 RX ORDER — SODIUM CHLORIDE 9 MG/ML
INJECTION, SOLUTION INTRAVENOUS CONTINUOUS PRN
Status: DISCONTINUED | OUTPATIENT
Start: 2024-06-14 | End: 2024-06-14 | Stop reason: SDUPTHER

## 2024-06-14 RX ORDER — EPHEDRINE SULFATE/0.9% NACL/PF 25 MG/5 ML
SYRINGE (ML) INTRAVENOUS PRN
Status: DISCONTINUED | OUTPATIENT
Start: 2024-06-14 | End: 2024-06-14 | Stop reason: SDUPTHER

## 2024-06-14 RX ADMIN — PROPOFOL 20 MG: 10 INJECTION, EMULSION INTRAVENOUS at 09:15

## 2024-06-14 RX ADMIN — FENTANYL CITRATE 25 MCG: 50 INJECTION, SOLUTION INTRAMUSCULAR; INTRAVENOUS at 10:25

## 2024-06-14 RX ADMIN — SODIUM CHLORIDE: 9 INJECTION, SOLUTION INTRAVENOUS at 08:52

## 2024-06-14 RX ADMIN — LIDOCAINE HYDROCHLORIDE 40 MG: 20 INJECTION, SOLUTION EPIDURAL; INFILTRATION; INTRACAUDAL; PERINEURAL at 08:59

## 2024-06-14 RX ADMIN — PROPOFOL 30 MG: 10 INJECTION, EMULSION INTRAVENOUS at 09:03

## 2024-06-14 RX ADMIN — PROPOFOL 20 MG: 10 INJECTION, EMULSION INTRAVENOUS at 09:29

## 2024-06-14 RX ADMIN — CALCIUM ACETATE 667 MG: 667 CAPSULE ORAL at 18:25

## 2024-06-14 RX ADMIN — CEFAZOLIN 2 G: 1 INJECTION, POWDER, FOR SOLUTION INTRAMUSCULAR; INTRAVENOUS at 09:14

## 2024-06-14 RX ADMIN — EPHEDRINE SULFATE 5 MG: 5 INJECTION INTRAVENOUS at 09:31

## 2024-06-14 RX ADMIN — EPHEDRINE SULFATE 5 MG: 5 INJECTION INTRAVENOUS at 09:35

## 2024-06-14 RX ADMIN — TRAMADOL HYDROCHLORIDE 50 MG: 50 TABLET ORAL at 18:25

## 2024-06-14 RX ADMIN — PROPOFOL 30 MG: 10 INJECTION, EMULSION INTRAVENOUS at 09:13

## 2024-06-14 RX ADMIN — PROPOFOL 30 MG: 10 INJECTION, EMULSION INTRAVENOUS at 09:11

## 2024-06-14 RX ADMIN — EPHEDRINE SULFATE 5 MG: 5 INJECTION INTRAVENOUS at 09:26

## 2024-06-14 RX ADMIN — PROPOFOL 20 MG: 10 INJECTION, EMULSION INTRAVENOUS at 09:17

## 2024-06-14 RX ADMIN — PROPOFOL 20 MG: 10 INJECTION, EMULSION INTRAVENOUS at 09:22

## 2024-06-14 ASSESSMENT — ENCOUNTER SYMPTOMS: DYSPNEA ACTIVITY LEVEL: AFTER AMBULATING 1 FLIGHT OF STAIRS

## 2024-06-14 ASSESSMENT — PAIN SCALES - GENERAL
PAINLEVEL_OUTOF10: 4
PAINLEVEL_OUTOF10: 0
PAINLEVEL_OUTOF10: 6
PAINLEVEL_OUTOF10: 0

## 2024-06-14 ASSESSMENT — PAIN DESCRIPTION - DESCRIPTORS
DESCRIPTORS: SORE
DESCRIPTORS: SORE

## 2024-06-14 ASSESSMENT — PAIN DESCRIPTION - ORIENTATION
ORIENTATION: RIGHT
ORIENTATION: RIGHT

## 2024-06-14 ASSESSMENT — PAIN DESCRIPTION - LOCATION
LOCATION: LEG
LOCATION: LEG

## 2024-06-14 NOTE — ANESTHESIA POSTPROCEDURE EVALUATION
Department of Anesthesiology  Postprocedure Note    Patient: Aniceto Interiano Sr  MRN: [id number]  YOB: 1945  Date of evaluation: 6/14/2024    Procedure Summary       Date: 06/14/24 Room / Location: [facility] 02 / [facility] CARDIAC SURGERY    Anesthesia Start: 0852 Anesthesia Stop: 1018    Procedure: TUNNELED DIALYSIS CATHETER PLACEMENT, REMOVAL OF CURRENT TEMPORARY DIALYSIS CATHETER (Groin) Diagnosis:       ESRD (end stage renal disease) ([facility])      AV fistula occlusion, initial encounter ([facility])      (ESRD (end stage renal disease) ([facility]) [N18.6])      (AV fistula occlusion, initial encounter ([facility]) [T82.898A])    Surgeons: Alejandro Agee MD Responsible Provider: Faraz Bradshaw MD    Anesthesia Type: MAC ASA Status: 4            Anesthesia Type: MAC    Harry Phase I: Harry Score: 9    Harry Phase II:      Anesthesia Post Evaluation    Patient location during evaluation: PACU  Patient participation: complete - patient participated  Level of consciousness: awake  Airway patency: patent  Nausea & Vomiting: no nausea  Cardiovascular status: hemodynamically stable  Respiratory status: acceptable  Hydration status: euvolemic  Pain management: adequate    No notable events documented.

## 2024-06-14 NOTE — DIALYSIS
HD Care plan  Time: 3 hrs  Dialysate: 2 K+  2.5 Ca++  Bath  Net UF: 1 L  Access: Aseptic care for RT fEMORAL TDC  Hemodynamic stability: Maintain BP WNL     Pre Dialysis:  Patient received from Zuleyka Garcia RN . Patient arrived on a bed, A+O x 4, on RA, no s/s of acute distress noted. RT Femoral TDC assessed, no abnormalities noted. TDC accessed per protocol without any difficulty, line patent with good flow.     Intra Dialysis:  Time out / safety process performed per policy. Tx initiated at 1430.    TDC flowing with ease. For hemodynamic stability UF goal set at 1000 ml as tolerated.  Pt offered assistance with repositioning every 2 hours/prn    Vascular access visible and line connections remained intact throughout entire duration of treatment.   Vital signs checked every 15 mins. WNL     Post Dialysis:  Tx completed at 1730,   Tolerated well, 1 L  removed. De-accessed per protocol.    Dialysis catheter locked accordingly with Heparin 2.3 ml in arterial port, and 2.3 ml in venous port. Catheter dressing clean, dry and intact.  Post Dialysis report given to ORI Weems

## 2024-06-14 NOTE — PROGRESS NOTES
Vascular Surgery    Plan for tunneled dialysis catheter placement and removal of current temporary dialysis catheter. Risks, benefits and alternatives were discussed with the patient including but not limited to bleeding, infection, injury to surrounding structures including nerves and/or other organs, scar tissue accumulation, need for surveillance, need for further procedures, venous fibrosis/scarring, DVT, pneumothorax with need for chest tube placement, revision/replacement of the catheter to maintain function for dialysis.  The patient expressed understanding after the opportunity to ask questions, and consented to the procedure.     Alejandro Agee MD  [facility]  Vascular Surgeon

## 2024-06-14 NOTE — PROGRESS NOTES
Cardiology Progress Note    Admit Date: 6/13/2024  Attending Cardiologist: Dr. Roslyn MACIAS  Preoperative risk stratification, due for left upper extremity thrombectomy with possible fistulogram  Atrial fibrillation  Hypertension - normotensive  Coronary risk equivalent end-stage renal disease on hemodialysis  History of DVT  Chronic anticoagulation     PLAN  Patient is at least medium to high risk due to history of congestive heart failure as well as end-stage renal disease on hemodialysis, 2D echo is pending.  Consideration for stress test on Monday if patient is cooperative.    Monitor blood pressure, adjust antihypertensive medications as necessary.  Continue Norvasc 10 mg p.o. daily  Statin if can tolerate prior to discharge.  Eliquis 2.5 mg p.o. twice daily for primary prevention of stroke in context of atrial fibrillation prior to discharge     Thank you for this consultation and for allowing us to participate in this patient's care.     Primary Cardiologist Dr. ALEXANDRU Martinez    Subjective:     Denies chest pain  Denies shortness of breath  Denies abdominal pain    Objective:      Patient Vitals for the past 8 hrs:   Temp Pulse Resp BP SpO2   06/14/24 1800 97.6 °F (36.4 °C) 55 16 117/76 94 %   06/14/24 1745 99 °F (37.2 °C) 84 18 (!) 100/53 92 %   06/14/24 1730 98.6 °F (37 °C) 54 18 122/76 --   06/14/24 1715 -- 60 -- 117/70 --   06/14/24 1700 -- 63 -- 122/79 --   06/14/24 1645 -- 74 -- 122/79 --   06/14/24 1630 -- 67 -- 121/77 --   06/14/24 1615 -- 60 -- 112/70 --   06/14/24 1600 -- 58 -- 121/79 --   06/14/24 1545 -- 53 -- 118/68 --   06/14/24 1530 -- 56 -- 106/70 --   06/14/24 1515 -- 60 -- 113/76 --   06/14/24 1500 -- 54 -- 112/77 --   06/14/24 1445 -- 57 -- 110/80 --   06/14/24 1430 -- 54 -- 125/78 --   06/14/24 1415 97.6 °F (36.4 °C) 59 18 (!) 140/89 --   06/14/24 1101 97.5 °F (36.4 °C) 55 14 120/73 94 %   06/14/24 1046 -- 55 13 118/78 --   06/14/24 1033 -- 57 12 120/71 100 %   06/14/24 1023 -- 53 16 (!)  \"TBIL\", \"AP\", \"SGOT\", \"GPT\", \"DBIL\"   Thyroid Studies No results found for: \"T4\", \"T3RU\", \"TSH\"       Signed By: ACE SALAS MD     June 14, 2024

## 2024-06-14 NOTE — PROGRESS NOTES
0725- Bedside and Verbal shift change report given to ORI Gardiner (oncoming nurse) by ORI Burgos (offgoing nurse). Report included the following information SBAR, Intake/Output, MAR, Recent Results, and Cardiac Rhythm AFIB/González .     0737- Shift assessment performed per flowsheets. PREOP checklist filled out.    0750- Patient transported off unit via bed to PREOP.    1100- Patient transported back to unit via bed from PACU. Reassessment performed per flowsheets.    1400- Patient transported off unit via bed to dialysis.    1800- Patient transported back to unit via bed from dialysis. Assessment performed per flowsheets.    1920- Bedside and Verbal shift change report given to ORI Mckeon (oncoming nurse) by ORI Gardiner (offgoing nurse). Report included the following information SBAR, Intake/Output, MAR, Recent Results, and Cardiac Rhythm AFIB/González .

## 2024-06-14 NOTE — PLAN OF CARE
Problem: Pain  Goal: Verbalizes/displays adequate comfort level or baseline comfort level  6/13/2024 2205 by Loretta Méndez RN  Outcome: Progressing  6/13/2024 1807 by Zuleyka Garcia RN  Outcome: Progressing     Problem: Discharge Planning  Goal: Discharge to home or other facility with appropriate resources  6/13/2024 2205 by Loretta Méndez RN  Outcome: Progressing  Flowsheets (Taken 6/13/2024 2006)  Discharge to home or other facility with appropriate resources:   Identify barriers to discharge with patient and caregiver   Arrange for needed discharge resources and transportation as appropriate   Identify discharge learning needs (meds, wound care, etc)   Refer to discharge planning if patient needs post-hospital services based on physician order or complex needs related to functional status, cognitive ability or social support system  6/13/2024 1807 by Zuleyka Garcia RN  Outcome: Progressing  Flowsheets (Taken 6/13/2024 1300)  Discharge to home or other facility with appropriate resources: Identify barriers to discharge with patient and caregiver     Problem: Chronic Conditions and Co-morbidities  Goal: Patient's chronic conditions and co-morbidity symptoms are monitored and maintained or improved  6/13/2024 2205 by Loretta Méndez RN  Outcome: Progressing  Flowsheets (Taken 6/13/2024 2006)  Care Plan - Patient's Chronic Conditions and Co-Morbidity Symptoms are Monitored and Maintained or Improved:   Monitor and assess patient's chronic conditions and comorbid symptoms for stability, deterioration, or improvement   Collaborate with multidisciplinary team to address chronic and comorbid conditions and prevent exacerbation or deterioration   Update acute care plan with appropriate goals if chronic or comorbid symptoms are exacerbated and prevent overall improvement and discharge  6/13/2024 1807 by Zuleyka Garcia RN  Outcome: Progressing  6/13/2024 1407 by Alma Guzman RN  Outcome:  Progressing  Flowsheets  Taken 6/13/2024 1407 by Alma Guzman RN  Care Plan - Patient's Chronic Conditions and Co-Morbidity Symptoms are Monitored and Maintained or Improved:   Monitor and assess patient's chronic conditions and comorbid symptoms for stability, deterioration, or improvement   Collaborate with multidisciplinary team to address chronic and comorbid conditions and prevent exacerbation or deterioration   Update acute care plan with appropriate goals if chronic or comorbid symptoms are exacerbated and prevent overall improvement and discharge  Taken 6/13/2024 1300 by Zuleyka Garcia, RN  Care Plan - Patient's Chronic Conditions and Co-Morbidity Symptoms are Monitored and Maintained or Improved:   Monitor and assess patient's chronic conditions and comorbid symptoms for stability, deterioration, or improvement   Collaborate with multidisciplinary team to address chronic and comorbid conditions and prevent exacerbation or deterioration     Problem: Safety - Adult  Goal: Free from fall injury  6/13/2024 2205 by Loretta Méndez, RN  Outcome: Progressing  6/13/2024 1807 by Zuleyka Garcia, RN  Outcome: Progressing

## 2024-06-14 NOTE — PLAN OF CARE
Problem: Pain  Goal: Verbalizes/displays adequate comfort level or baseline comfort level  Outcome: Progressing     Problem: Discharge Planning  Goal: Discharge to home or other facility with appropriate resources  Outcome: Progressing  Flowsheets (Taken 6/14/2024 0737)  Discharge to home or other facility with appropriate resources: Identify barriers to discharge with patient and caregiver     Problem: Chronic Conditions and Co-morbidities  Goal: Patient's chronic conditions and co-morbidity symptoms are monitored and maintained or improved  Outcome: Progressing  Flowsheets (Taken 6/14/2024 0737)  Care Plan - Patient's Chronic Conditions and Co-Morbidity Symptoms are Monitored and Maintained or Improved:   Monitor and assess patient's chronic conditions and comorbid symptoms for stability, deterioration, or improvement   Collaborate with multidisciplinary team to address chronic and comorbid conditions and prevent exacerbation or deterioration     Problem: Safety - Adult  Goal: Free from fall injury  Outcome: Progressing

## 2024-06-14 NOTE — BRIEF OP NOTE
Brief Postoperative Note      Patient: Aniceto Interiano Sr  YOB: 1945  MRN: [id number]    Date of Procedure: 6/14/2024    Pre-Op Diagnosis Codes:     * ESRD (end stage renal disease) ([facility]) [N18.6]     * AV fistula occlusion, initial encounter ([facility]) [T82.898A]  Current temporary HD catheter in place. Need for tunneled catheter    Post-Op Diagnosis: Same       Procedure:   U/s guided access of the R common femoral vein  Placement of a 33cm Palindrome tunneled dialysis catheter in the R CFV  Removal of L groin Trialysis catheter      Surgeon(s):  Alejandro Agee MD    Assistant:  Surgical Assistant: Dada Ferraro    Anesthesia: Monitor Anesthesia Care    Estimated Blood Loss (mL): 2mL    Complications: None    Specimens:   * No specimens in log *    Implants:  * No implants in log *      Drains: * No LDAs found *    Findings:  Infection Present At Time Of Surgery (PATOS) (choose all levels that have infection present):  No infection present  Other Findings: Brisk flush and aspiration from both R groin TDC ports.     Disposition: to recovery in stable condition.     Electronically signed by Alejandro Agee MD on 6/14/2024 at 9:40 AM

## 2024-06-14 NOTE — PROGRESS NOTES
Bedside shift change report given to ORI Burgos (oncoming nurse) by ORI Gardiner (offgoing nurse). Report included the following information Nurse Handoff Report, Cardiac Rhythm Afib slow vent response, and Alarm Parameters.      2000: A&O x 3, denies any chest pain or discomfort, on room air, skin warm and dry, comfortable in bed, all needs within reach.    2108: c/o pain in both lower extremities.  > due meds given    2330: reassessment done, VSS, call bell @ bedside.  > reminded to kept NPO.  0100: HR 33's to 40's not sustaining, pt is asymptomatic, vital signs BP-109/67.  0110:called cardio on call 2x- no response  0400:VSS, reassessment done.  > CHG bath rendered, gown and linen changed.    0515: spoken to cardio on call  DR. Laureano and said they will see the patient in the morning.    Bedside shift change report given to ORI Gardiner (oncoming nurse) by ORI Burgos (offgoing nurse). Report included the following information Nurse Handoff Report, Cardiac Rhythm Afib with slow vent response, and Alarm Parameters.

## 2024-06-14 NOTE — PROGRESS NOTES
Vascular Surgery    Spoke w/ cardiology. Will plan to keep pt inpatient over the weekend and obtain nuclear stress test on Monday, in order to risk stratify for LUE AVG revision. Appreciate cardiology and nephrology involvement and assistance.     MD Demarcus Heller [facility]  Vascular Surgeon

## 2024-06-14 NOTE — ANESTHESIA PRE PROCEDURE
Department of Anesthesiology  Preprocedure Note       Name:  Aniceto Interiano Sr   Age:  78 y.o.  :  1945                                          MRN:  [id number]         Date:  2024      Surgeon: Surgeon(s):  Alejandro Agee MD    Procedure: Procedure(s):  TUNNELED DIALYSIS CATHETER PLACEMENT    Medications prior to admission:   Prior to Admission medications    Medication Sig Start Date End Date Taking? Authorizing Provider   VELPHORO 500 MG CHEW chewable tablet CHEW AND SWALLOW 1 TABLET BY MOUTH THREE TIMES DAILY WITH MEALS 24   Guillermina Dumont DO   apixaban (ELIQUIS) 2.5 MG TABS tablet Take 1 tablet by mouth 2 times daily 24   Neeraj Martinez MD   NONFORMULARY     ProviderYasmine MD   traMADol (ULTRAM ER) 100 MG extended release tablet Take 1 tablet by mouth daily as needed for Pain for up to 30 days. Max Daily Amount: 100 mg  Patient not taking: Reported on 2024  Guillermina Dumont DO   naloxone 4 MG/0.1ML LIQD nasal spray 1 spray by Nasal route as needed for Opioid Reversal  Patient not taking: Reported on 2024   Guillermina Dumont DO   Ascorbic Acid (VITAMIN C ER PO) Take 500 mg by mouth daily    ProviderYasmine MD   Cholecalciferol (VITAMIN D-3 PO) Take 1,000 Units by mouth daily    ProviderYasmine MD   Cyanocobalamin (VITAMIN B-12 CR PO) Take 1,000 mcg by mouth daily    ProviderYasmine MD   acetaminophen (TYLENOL 8 HOUR ARTHRITIS PAIN) 650 MG extended release tablet Take 1 tablet by mouth every 8 hours as needed for Pain 23   Guillermina Dumont DO   amLODIPine (NORVASC) 10 MG tablet Take 1 tablet by mouth daily 23   Guillermina Dumont DO   promethazine (PHENERGAN) 12.5 MG tablet Take 1 tablet by mouth every 6 hours as needed for Nausea 23   Guillermina Dumont DO       Current medications:    Current Facility-Administered Medications   Medication Dose Route Frequency Provider Last Rate Last Admin    BUPivacaine (PF) (MARCAINE) 0.25 %

## 2024-06-15 PROCEDURE — 94761 N-INVAS EAR/PLS OXIMETRY MLT: CPT

## 2024-06-15 PROCEDURE — 99232 SBSQ HOSP IP/OBS MODERATE 35: CPT | Performed by: INTERNAL MEDICINE

## 2024-06-15 PROCEDURE — 2500000003 HC RX 250 WO HCPCS: Performed by: PHYSICIAN ASSISTANT

## 2024-06-15 PROCEDURE — 6370000000 HC RX 637 (ALT 250 FOR IP): Performed by: PHYSICIAN ASSISTANT

## 2024-06-15 PROCEDURE — 6370000000 HC RX 637 (ALT 250 FOR IP): Performed by: SURGERY

## 2024-06-15 PROCEDURE — G0378 HOSPITAL OBSERVATION PER HR: HCPCS

## 2024-06-15 RX ORDER — TRAMADOL HYDROCHLORIDE 50 MG/1
50 TABLET ORAL 2 TIMES DAILY
Status: DISCONTINUED | OUTPATIENT
Start: 2024-06-15 | End: 2024-06-21 | Stop reason: HOSPADM

## 2024-06-15 RX ADMIN — TRAMADOL HYDROCHLORIDE 50 MG: 50 TABLET ORAL at 20:36

## 2024-06-15 RX ADMIN — CALCIUM ACETATE 667 MG: 667 CAPSULE ORAL at 13:09

## 2024-06-15 RX ADMIN — CHOLECALCIFEROL TAB 25 MCG (1000 UNIT) 1000 UNITS: 25 TAB at 09:31

## 2024-06-15 RX ADMIN — TRAMADOL HYDROCHLORIDE 50 MG: 50 TABLET ORAL at 13:09

## 2024-06-15 RX ADMIN — Medication 500 MG: at 09:31

## 2024-06-15 RX ADMIN — AMLODIPINE BESYLATE 10 MG: 10 TABLET ORAL at 09:31

## 2024-06-15 RX ADMIN — CALCIUM ACETATE 667 MG: 667 CAPSULE ORAL at 09:31

## 2024-06-15 RX ADMIN — CALCIUM ACETATE 667 MG: 667 CAPSULE ORAL at 17:01

## 2024-06-15 RX ADMIN — CYANOCOBALAMIN TAB 1000 MCG 1000 MCG: 1000 TAB at 09:31

## 2024-06-15 RX ADMIN — TRAMADOL HYDROCHLORIDE 50 MG: 50 TABLET ORAL at 09:31

## 2024-06-15 ASSESSMENT — PAIN SCALES - GENERAL
PAINLEVEL_OUTOF10: 0
PAINLEVEL_OUTOF10: 2
PAINLEVEL_OUTOF10: 0

## 2024-06-15 ASSESSMENT — PAIN DESCRIPTION - LOCATION: LOCATION: GENERALIZED;LEG

## 2024-06-15 ASSESSMENT — PAIN DESCRIPTION - ORIENTATION: ORIENTATION: LEFT;RIGHT

## 2024-06-15 ASSESSMENT — PAIN DESCRIPTION - DESCRIPTORS: DESCRIPTORS: ACHING

## 2024-06-15 ASSESSMENT — PAIN - FUNCTIONAL ASSESSMENT: PAIN_FUNCTIONAL_ASSESSMENT: ACTIVITIES ARE NOT PREVENTED

## 2024-06-15 NOTE — PROGRESS NOTES
Doing well without acute issues  Cardiac testing planned for Monday  Continue as is through weekend.

## 2024-06-15 NOTE — CARE COORDINATION
06/15/24 1353   IMM Letter   Observation Status Letter date given: 06/15/24   Observation Status Letter time given: 1330   Observation Status Letter given to Patient/Family/Significant other/Guardian/POA/by: Yoana Pastrana: Provided to pt at bedside     Medicare Observation letter was provided to pt at bedside, however pt is blind in right eye and just placed a rica inside the signature box. SW called pt daughter Karen Interiano [phone] to verbally explain the Medicare Observation letter to her as well. Signed copy placed in pt chart and CM office.    Yoana Pastrana BSW  Case Management

## 2024-06-15 NOTE — PROGRESS NOTES
Bedside and Verbal shift change report given to Linda Oneil RN (oncoming nurse) by Zuleyka Garcia (offgoing nurse). Report included the following information Nurse Handoff Report, Adult Overview, and Cardiac Rhythm Atrial Fibrillation .

## 2024-06-15 NOTE — PLAN OF CARE
Problem: Pain  Goal: Verbalizes/displays adequate comfort level or baseline comfort level  6/14/2024 2031 by Zuleyka Garcia RN  Outcome: Progressing  6/14/2024 2031 by Zuleyka Garcia RN  Outcome: Progressing  6/14/2024 1608 by Zuleyka Garcia RN  Outcome: Progressing     Problem: Discharge Planning  Goal: Discharge to home or other facility with appropriate resources  6/14/2024 2031 by Zuleyka Garcia RN  Outcome: Progressing  6/14/2024 2031 by Zuleyka Garcia RN  Outcome: Progressing  6/14/2024 1608 by Zuleyka Garcia RN  Outcome: Progressing  Flowsheets (Taken 6/14/2024 0737)  Discharge to home or other facility with appropriate resources: Identify barriers to discharge with patient and caregiver     Problem: Chronic Conditions and Co-morbidities  Goal: Patient's chronic conditions and co-morbidity symptoms are monitored and maintained or improved  6/14/2024 2031 by Zuleyka Garcia RN  Outcome: Progressing  6/14/2024 2031 by Zuleyka Garcia RN  Outcome: Progressing  6/14/2024 1608 by Zuleyka Garcia RN  Outcome: Progressing  Flowsheets (Taken 6/14/2024 0737)  Care Plan - Patient's Chronic Conditions and Co-Morbidity Symptoms are Monitored and Maintained or Improved:   Monitor and assess patient's chronic conditions and comorbid symptoms for stability, deterioration, or improvement   Collaborate with multidisciplinary team to address chronic and comorbid conditions and prevent exacerbation or deterioration     Problem: Safety - Adult  Goal: Free from fall injury  6/14/2024 2031 by Zuleyka Garcia RN  Outcome: Progressing  6/14/2024 2031 by Zuleyka Garcia RN  Outcome: Progressing  6/14/2024 1608 by Zuleyka Garcia RN  Outcome: Progressing

## 2024-06-15 NOTE — PLAN OF CARE
Problem: Pain  Goal: Verbalizes/displays adequate comfort level or baseline comfort level  Outcome: HH/HSPC Not Progressing     Problem: Discharge Planning  Goal: Discharge to home or other facility with appropriate resources  Outcome: HH/HSPC Not Progressing  Flowsheets (Taken 6/15/2024 0930)  Discharge to home or other facility with appropriate resources: Identify barriers to discharge with patient and caregiver     Problem: Chronic Conditions and Co-morbidities  Goal: Patient's chronic conditions and co-morbidity symptoms are monitored and maintained or improved  Outcome: HH/HSPC Not Progressing  Flowsheets (Taken 6/15/2024 0930)  Care Plan - Patient's Chronic Conditions and Co-Morbidity Symptoms are Monitored and Maintained or Improved:   Monitor and assess patient's chronic conditions and comorbid symptoms for stability, deterioration, or improvement   Collaborate with multidisciplinary team to address chronic and comorbid conditions and prevent exacerbation or deterioration     Problem: Safety - Adult  Goal: Free from fall injury  Outcome: HH/HSPC Not Progressing

## 2024-06-15 NOTE — CARE COORDINATION
06/15/24 1358   Service Assessment   Patient Orientation Alert and Oriented;Person;Situation;Self   Cognition Alert   History Provided By Child/Family   Primary Caregiver Family   Accompanied By/Relationship None   Support Systems Children   Patient's Healthcare Decision Maker is: Legal Next of Kin   PCP Verified by CM Yes   Last Visit to PCP Within last 3 months   Prior Functional Level Assistance with the following:;Bathing;Dressing;Toileting;Feeding;Cooking;Mobility   Current Functional Level Assistance with the following:;Dressing;Toileting;Cooking;Housework;Feeding;Mobility   Can patient return to prior living arrangement Yes   Ability to make needs known: Good   Family able to assist with home care needs: Yes   Would you like for me to discuss the discharge plan with any other family members/significant others, and if so, who? No   Financial Resources Medicare   Community Resources Transportation   CM/SW Referral Abuse or neglect concerns   Social/Functional History   Lives With Daughter   Type of Home Apartment   Home Layout One level   Home Access Level entry   Bathroom Shower/Tub Tub/Shower unit   Bathroom Toilet Standard   Bathroom Equipment Shower chair   Bathroom Accessibility Accessible   Home Equipment Walker - Standard;Rollator   Receives Help From Family   ADL Assistance Needs assistance   Bath Moderate assistance   Dressing Moderate assistance   Grooming Moderate assistance   Feeding Moderate assistance   Toileting Needs assistance   Homemaking Assistance Needs assistance   Meal Prep Moderate   Driving Maximal   Shopping Moderate   Homemaking Responsibilities No   Ambulation Assistance Needs assistance   Transfer Assistance Needs assistance   Active  No   Patient's  Info Family   Mode of Transportation Family   Occupation Retired   Discharge Planning   Type of Residence Other (Comment)   Living Arrangements Children   Current Services Prior To Admission None   Potential Assistance

## 2024-06-15 NOTE — PROGRESS NOTES
[facility] Renal Dosing Services      Previous Regimen Tramadol 50mg QID   Serum Creatinine No results found for: \"HELENE\", \"CREAPOC\"   Creatinine Clearance Estimated Creatinine Clearance: 8 mL/min (A) (based on SCr of 7.94 mg/dL (H)).   BUN Lab Results   Component Value Date/Time    BUN 35 06/14/2024 03:36 AM           The following medication: tramadol was automatically dose-adjusted per [facility] P&T Committee Protocol, with respect to renal function.      Dosage changed to:  50mg BID    Additional notes:    Pharmacy to continue to monitor patient daily.   Will make dosage adjustments based upon changing renal function.  Signed XIN NGUYEN RPH.

## 2024-06-15 NOTE — PROGRESS NOTES
0725- Bedside and Verbal shift change report given to ORI Gardiner (oncoming nurse) by ORI Mckeon (offgoing nurse). Report included the following information SBAR, Intake/Output, MAR, Recent Results, and Cardiac Rhythm AFIB/González .     0830- Informed Dr. Mcgowan of Patient's HR as low as 30bpm overnight. Patient drops to mid-low 40bpm during daytime. Dr. Mcgowan acknowledged.     1100- Spoke with  regarding Patient's request for senior living facility.  to discuss with Patient.    1330-  requests OT/PT consult to assist with transition to SNF.    1935- Bedside and Verbal shift change report given to ORI Connelly (oncoming nurse) by ORI Gardiner (offgoing nurse). Report included the following information SBAR, Intake/Output, MAR, Recent Results, and Cardiac Rhythm AFIB/González .

## 2024-06-15 NOTE — PROGRESS NOTES
Cardiology Progress Note    Admit Date: 6/13/2024  Attending Cardiologist: Dr. Mcgowan    IMPRESSION  Preoperative risk stratification, due for left upper extremity thrombectomy with possible fistulogram  Atrial fibrillation  Bradycardia asymptomatic  Hypertension - normotensive  Coronary risk equivalent end-stage renal disease on hemodialysis  History of DVT  Chronic anticoagulation     PLAN  Patient is at least medium to high risk due to history of congestive heart failure as well as end-stage renal disease on hemodialysis, 2D echo is pending.  Consideration for stress test on Monday if patient is cooperative, n.p.o. midnight on Sunday  Monitor blood pressure, adjust antihypertensive medications as necessary.  Continue Norvasc 10 mg p.o. daily  Statin if can tolerate prior to discharge.  Eliquis 2.5 mg p.o. twice daily for primary prevention of stroke in context of atrial fibrillation prior to discharge  We will follow peripherally please call for questions     Thank you for this consultation and for allowing us to participate in this patient's care.     Primary Cardiologist Dr. ALEXANDRU Martinez    Subjective:     Denies chest pain  Denies shortness of breath  Denies abdominal pain    Objective:      Patient Vitals for the past 8 hrs:   Temp Pulse Resp BP SpO2   06/15/24 0800 97.1 °F (36.2 °C) 56 -- 117/67 100 %   06/15/24 0406 97.9 °F (36.6 °C) 57 14 115/72 92 %         Patient Vitals for the past 96 hrs:   Weight   06/15/24 0406 72.8 kg (160 lb 9.6 oz)   06/13/24 1659 75.2 kg (165 lb 12.8 oz)   06/13/24 1346 79.9 kg (176 lb 1.6 oz)           Intake/Output Summary (Last 24 hours) at 6/15/2024 0828  Last data filed at 6/14/2024 1919  Gross per 24 hour   Intake 1050 ml   Output 1500 ml   Net -450 ml       EXAMINATION:  General:         Alert, cooperative, no distress  Head:  Normocephalic, without obvious abnormality, atraumatic.  Eyes:   Conjunctivae/corneas clear  Lungs:            Clear to auscultation bilaterally, no

## 2024-06-15 NOTE — CARE COORDINATION
13:15 SW spoke with patient at bedside along with pt nurse Lexie, pt expressed how he not being feed properly at home and would like to go to a nursing home.     Per pt, his daughter only feeds him \"Cheerios\" and \"Oatmeal\" on the weekends.    Per pt ,his daughter  Karen Interiano charge him 1000 dollars monthly for rent and food (500-rent) (500-food).     Per pt, his family leaves food out on counters uncovered and ants get into his food.     Per pt, if he is asleep after 10:00 pm his family will not allow him to eat until the next-day.     Per pt, family would not allow him to take public transportation dialysis he attend on M/W/F at [facility]. He stated \"they insist on taking me but make me miss my days; they treat me like a child.\"     14:00 SW called pt daughter Karen Interiano to discuss pt verbalizing wanting to go nursing home when discharged. Ms. Interiano was agreeable, per Ms. Interiano \"pt is in his right state of mind and choose wherever he would like to go.\"    JABARI asked if Ms. Interiano would like for SW to email a [facility] rated list of SNF, she decline wanted the list emailed.    JABARI asked Ms. Interiano who transport pt to his dialysis; she stated \"herself or her daughter Nicolás when she has to work.\"    Yoana Pastrana BSW  Case Management

## 2024-06-16 PROCEDURE — 6370000000 HC RX 637 (ALT 250 FOR IP): Performed by: PHYSICIAN ASSISTANT

## 2024-06-16 PROCEDURE — 2500000003 HC RX 250 WO HCPCS: Performed by: PHYSICIAN ASSISTANT

## 2024-06-16 PROCEDURE — G0378 HOSPITAL OBSERVATION PER HR: HCPCS

## 2024-06-16 PROCEDURE — 6370000000 HC RX 637 (ALT 250 FOR IP): Performed by: SURGERY

## 2024-06-16 PROCEDURE — 94761 N-INVAS EAR/PLS OXIMETRY MLT: CPT

## 2024-06-16 RX ADMIN — CYANOCOBALAMIN TAB 1000 MCG 1000 MCG: 1000 TAB at 08:43

## 2024-06-16 RX ADMIN — AMLODIPINE BESYLATE 10 MG: 10 TABLET ORAL at 08:42

## 2024-06-16 RX ADMIN — TRAMADOL HYDROCHLORIDE 50 MG: 50 TABLET ORAL at 08:43

## 2024-06-16 RX ADMIN — CHOLECALCIFEROL TAB 25 MCG (1000 UNIT) 1000 UNITS: 25 TAB at 08:43

## 2024-06-16 RX ADMIN — CALCIUM ACETATE 667 MG: 667 CAPSULE ORAL at 12:20

## 2024-06-16 RX ADMIN — Medication 500 MG: at 08:43

## 2024-06-16 RX ADMIN — ACETAMINOPHEN 325MG 650 MG: 325 TABLET ORAL at 20:02

## 2024-06-16 RX ADMIN — CALCIUM ACETATE 667 MG: 667 CAPSULE ORAL at 08:43

## 2024-06-16 RX ADMIN — CALCIUM ACETATE 667 MG: 667 CAPSULE ORAL at 16:48

## 2024-06-16 ASSESSMENT — PAIN SCALES - GENERAL
PAINLEVEL_OUTOF10: 0
PAINLEVEL_OUTOF10: 6

## 2024-06-16 ASSESSMENT — PAIN DESCRIPTION - ONSET: ONSET: ON-GOING

## 2024-06-16 ASSESSMENT — PAIN - FUNCTIONAL ASSESSMENT: PAIN_FUNCTIONAL_ASSESSMENT: ACTIVITIES ARE NOT PREVENTED

## 2024-06-16 ASSESSMENT — PAIN DESCRIPTION - FREQUENCY: FREQUENCY: CONTINUOUS

## 2024-06-16 ASSESSMENT — PAIN DESCRIPTION - LOCATION: LOCATION: GENERALIZED;LEG

## 2024-06-16 ASSESSMENT — PAIN DESCRIPTION - PAIN TYPE: TYPE: CHRONIC PAIN

## 2024-06-16 ASSESSMENT — PAIN DESCRIPTION - ORIENTATION: ORIENTATION: RIGHT;LEFT

## 2024-06-16 ASSESSMENT — PAIN DESCRIPTION - DESCRIPTORS: DESCRIPTORS: ACHING

## 2024-06-16 ASSESSMENT — PAIN SCALES - WONG BAKER: WONGBAKER_NUMERICALRESPONSE: NO HURT

## 2024-06-16 NOTE — PROGRESS NOTES
Advance Care Planning   Healthcare Decision Maker:    Primary Decision Maker: KISHA MENON - Child - [phone]    Today we documented Decision Maker(s) consistent with Legal Next of Kin hierarchy.      conducted an initial consultation and Spiritual Assessment for Aniceto Menon Sr, who is a 78 y.o.,male. Patient's Primary Language is: English.   According to the patient's EMR Islam Affiliation is: Mormon.     The reason the Patient came to the hospital is:   Patient Active Problem List    Diagnosis Date Noted    Preoperative clearance 06/13/2024    Chronic anticoagulation 06/13/2024    Atrial fibrillation (HCC) 06/06/2024    Typical atrial flutter 06/06/2024    Thrombocytopenia, unspecified (HCC) 06/05/2024    Chronic deep vein thrombosis (DVT) of proximal vein of right lower extremity (HCC) 11/16/2023    ESRD (end stage renal disease) ([facility]) 11/09/2023    Hypertension 11/09/2023    History of DVT (deep vein thrombosis) 11/09/2023    [place] (hard of hearing) 11/09/2023        The  provided the following Interventions:  Initiated a relationship of care and support.   Listened empathically.  Provided information about Spiritual Care Services.  Chart reviewed.    The following outcomes where achieved:  Patient expressed gratitude for 's visit.    Assessment:  Patient does not have any Anabaptism/cultural needs that will affect patient's preferences in health care.    Plan:  Chaplains will continue to follow and will provide pastoral care on an as needed/requested basis.   recommends bedside caregivers page  on duty if patient shows signs of acute spiritual or emotional distress.    pily Jiang  Spiritual Care   [phone]

## 2024-06-16 NOTE — PLAN OF CARE
Problem: Pain  Goal: Verbalizes/displays adequate comfort level or baseline comfort level  Outcome: Progressing     Problem: Discharge Planning  Goal: Discharge to home or other facility with appropriate resources  Outcome: Progressing     Problem: Chronic Conditions and Co-morbidities  Goal: Patient's chronic conditions and co-morbidity symptoms are monitored and maintained or improved  Outcome: Progressing     Problem: Safety - Adult  Goal: Free from fall injury  Outcome: Progressing

## 2024-06-16 NOTE — CARE COORDINATION
JABARI Agee asked to order PT/OT for pt. Patient and pt family would like for pt dispo to an SNF.      Yoana Pastrana BSW  Case Management

## 2024-06-16 NOTE — H&P
ESRD on HD MWF  Plan for HD tomorrow    Please call with questions    Angel Magallanes MD FASN  Cell [phone]  Pager: [phone]  Fax   [phone]

## 2024-06-17 ENCOUNTER — APPOINTMENT (OUTPATIENT)
Facility: HOSPITAL | Age: 79
DRG: 673 | End: 2024-06-17
Attending: SURGERY
Payer: MEDICARE

## 2024-06-17 PROBLEM — I73.9 PAD (PERIPHERAL ARTERY DISEASE) (HCC): Status: ACTIVE | Noted: 2024-06-17

## 2024-06-17 PROBLEM — R06.02 SHORTNESS OF BREATH: Status: ACTIVE | Noted: 2024-06-17

## 2024-06-17 LAB
ECHO AO ASC DIAM: 4.2 CM
ECHO AO ASCENDING AORTA INDEX: 2.1 CM/M2
ECHO AO ROOT DIAM: 4 CM
ECHO AO ROOT INDEX: 2 CM/M2
ECHO AV AREA PEAK VELOCITY: 2.7 CM2
ECHO AV AREA VTI: 3 CM2
ECHO AV AREA/BSA PEAK VELOCITY: 1.4 CM2/M2
ECHO AV AREA/BSA VTI: 1.5 CM2/M2
ECHO AV MEAN GRADIENT: 3 MMHG
ECHO AV MEAN VELOCITY: 0.7 M/S
ECHO AV PEAK GRADIENT: 5 MMHG
ECHO AV PEAK VELOCITY: 1.1 M/S
ECHO AV VELOCITY RATIO: 0.82
ECHO AV VTI: 20.4 CM
ECHO BSA: 1.96 M2
ECHO BSA: 1.97 M2
ECHO EST RA PRESSURE: 3 MMHG
ECHO LA DIAMETER INDEX: 2.6 CM/M2
ECHO LA DIAMETER: 5.2 CM
ECHO LA TO AORTIC ROOT RATIO: 1.3
ECHO LA VOL A-L A2C: 185 ML (ref 18–58)
ECHO LA VOL A-L A4C: 291 ML (ref 18–58)
ECHO LA VOL BP: 216 ML (ref 18–58)
ECHO LA VOL MOD A2C: 179 ML (ref 18–58)
ECHO LA VOL MOD A4C: 258 ML (ref 18–58)
ECHO LA VOL/BSA BIPLANE: 108 ML/M2 (ref 16–34)
ECHO LA VOLUME AREA LENGTH: 233 ML
ECHO LA VOLUME INDEX A-L A2C: 93 ML/M2 (ref 16–34)
ECHO LA VOLUME INDEX A-L A4C: 146 ML/M2 (ref 16–34)
ECHO LA VOLUME INDEX AREA LENGTH: 117 ML/M2 (ref 16–34)
ECHO LA VOLUME INDEX MOD A2C: 90 ML/M2 (ref 16–34)
ECHO LA VOLUME INDEX MOD A4C: 129 ML/M2 (ref 16–34)
ECHO LV E' LATERAL VELOCITY: 9 CM/S
ECHO LV E' SEPTAL VELOCITY: 12 CM/S
ECHO LV EDV A2C: 63 ML
ECHO LV EDV A4C: 106 ML
ECHO LV EDV BP: 83 ML (ref 67–155)
ECHO LV EDV INDEX A4C: 53 ML/M2
ECHO LV EDV INDEX BP: 42 ML/M2
ECHO LV EDV NDEX A2C: 32 ML/M2
ECHO LV EJECTION FRACTION A2C: 59 %
ECHO LV EJECTION FRACTION A4C: 74 %
ECHO LV EJECTION FRACTION BIPLANE: 66 % (ref 55–100)
ECHO LV ESV A2C: 26 ML
ECHO LV ESV A4C: 28 ML
ECHO LV ESV BP: 28 ML (ref 22–58)
ECHO LV ESV INDEX A2C: 13 ML/M2
ECHO LV ESV INDEX A4C: 14 ML/M2
ECHO LV ESV INDEX BP: 14 ML/M2
ECHO LV FRACTIONAL SHORTENING: 32 % (ref 28–44)
ECHO LV INTERNAL DIMENSION DIASTOLE INDEX: 2.2 CM/M2
ECHO LV INTERNAL DIMENSION DIASTOLIC: 4.4 CM (ref 4.2–5.9)
ECHO LV INTERNAL DIMENSION SYSTOLIC INDEX: 1.5 CM/M2
ECHO LV INTERNAL DIMENSION SYSTOLIC: 3 CM
ECHO LV IVSD: 1.3 CM (ref 0.6–1)
ECHO LV MASS 2D: 203 G (ref 88–224)
ECHO LV MASS INDEX 2D: 101.5 G/M2 (ref 49–115)
ECHO LV POSTERIOR WALL DIASTOLIC: 1.2 CM (ref 0.6–1)
ECHO LV RELATIVE WALL THICKNESS RATIO: 0.55
ECHO LVOT AREA: 3.5 CM2
ECHO LVOT AV VTI INDEX: 0.85
ECHO LVOT DIAM: 2.1 CM
ECHO LVOT MEAN GRADIENT: 2 MMHG
ECHO LVOT PEAK GRADIENT: 3 MMHG
ECHO LVOT PEAK VELOCITY: 0.9 M/S
ECHO LVOT STROKE VOLUME INDEX: 29.9 ML/M2
ECHO LVOT SV: 59.9 ML
ECHO LVOT VTI: 17.3 CM
ECHO MV E VELOCITY: 0.94 M/S
ECHO MV E/E' LATERAL: 10.44
ECHO MV E/E' RATIO (AVERAGED): 9.14
ECHO MV E/E' SEPTAL: 7.83
ECHO RA VOLUME: 72 ML
ECHO RA VOLUME: 79 ML
ECHO RIGHT VENTRICULAR SYSTOLIC PRESSURE (RVSP): 32 MMHG
ECHO RV BASAL DIMENSION: 4.9 CM
ECHO RV FREE WALL PEAK S': 11 CM/S
ECHO RV TAPSE: 1.6 CM (ref 1.7–?)
ECHO TV REGURGITANT MAX VELOCITY: 2.68 M/S
ECHO TV REGURGITANT PEAK GRADIENT: 29 MMHG
NUC STRESS EJECTION FRACTION: 67 %
STRESS BASELINE DIAS BP: 83 MMHG
STRESS BASELINE HR: 59 BPM
STRESS BASELINE SYS BP: 167 MMHG
STRESS ESTIMATED WORKLOAD: 1 METS
STRESS EXERCISE DUR MIN: 4 MIN
STRESS EXERCISE DUR SEC: 0 SEC
STRESS PEAK DIAS BP: 83 MMHG
STRESS PEAK SYS BP: 167 MMHG
STRESS PERCENT HR ACHIEVED: 49 %
STRESS POST PEAK HR: 68 BPM
STRESS RATE PRESSURE PRODUCT: NORMAL BPM*MMHG
STRESS ST DEPRESSION: 0 MM
STRESS TARGET HR: 140 BPM
TID: 0.95

## 2024-06-17 PROCEDURE — 2500000003 HC RX 250 WO HCPCS: Performed by: PHYSICIAN ASSISTANT

## 2024-06-17 PROCEDURE — 2140000001 HC CVICU INTERMEDIATE R&B

## 2024-06-17 PROCEDURE — 97166 OT EVAL MOD COMPLEX 45 MIN: CPT

## 2024-06-17 PROCEDURE — 93306 TTE W/DOPPLER COMPLETE: CPT

## 2024-06-17 PROCEDURE — 93017 CV STRESS TEST TRACING ONLY: CPT

## 2024-06-17 PROCEDURE — 93306 TTE W/DOPPLER COMPLETE: CPT | Performed by: INTERNAL MEDICINE

## 2024-06-17 PROCEDURE — 6370000000 HC RX 637 (ALT 250 FOR IP): Performed by: SURGERY

## 2024-06-17 PROCEDURE — 2580000003 HC RX 258: Performed by: PHYSICIAN ASSISTANT

## 2024-06-17 PROCEDURE — 97535 SELF CARE MNGMENT TRAINING: CPT

## 2024-06-17 PROCEDURE — A9502 TC99M TETROFOSMIN: HCPCS | Performed by: INTERNAL MEDICINE

## 2024-06-17 PROCEDURE — 97530 THERAPEUTIC ACTIVITIES: CPT

## 2024-06-17 PROCEDURE — 90935 HEMODIALYSIS ONE EVALUATION: CPT

## 2024-06-17 PROCEDURE — 3430000000 HC RX DIAGNOSTIC RADIOPHARMACEUTICAL: Performed by: INTERNAL MEDICINE

## 2024-06-17 PROCEDURE — 94761 N-INVAS EAR/PLS OXIMETRY MLT: CPT

## 2024-06-17 PROCEDURE — 97162 PT EVAL MOD COMPLEX 30 MIN: CPT

## 2024-06-17 PROCEDURE — 2580000003 HC RX 258: Performed by: INTERNAL MEDICINE

## 2024-06-17 PROCEDURE — 6370000000 HC RX 637 (ALT 250 FOR IP): Performed by: PHYSICIAN ASSISTANT

## 2024-06-17 PROCEDURE — 6360000002 HC RX W HCPCS: Performed by: INTERNAL MEDICINE

## 2024-06-17 PROCEDURE — 6360000002 HC RX W HCPCS: Performed by: PHYSICIAN ASSISTANT

## 2024-06-17 PROCEDURE — 99232 SBSQ HOSP IP/OBS MODERATE 35: CPT | Performed by: INTERNAL MEDICINE

## 2024-06-17 RX ORDER — SODIUM CHLORIDE 0.9 % (FLUSH) 0.9 %
5-40 SYRINGE (ML) INJECTION PRN
Status: DISCONTINUED | OUTPATIENT
Start: 2024-06-17 | End: 2024-06-21 | Stop reason: HOSPADM

## 2024-06-17 RX ORDER — SODIUM CHLORIDE 0.9 % (FLUSH) 0.9 %
5-40 SYRINGE (ML) INJECTION EVERY 12 HOURS SCHEDULED
Status: DISCONTINUED | OUTPATIENT
Start: 2024-06-17 | End: 2024-06-21 | Stop reason: HOSPADM

## 2024-06-17 RX ORDER — ACETAMINOPHEN 325 MG/1
650 TABLET ORAL EVERY 6 HOURS PRN
Status: DISCONTINUED | OUTPATIENT
Start: 2024-06-17 | End: 2024-06-21 | Stop reason: HOSPADM

## 2024-06-17 RX ORDER — POLYETHYLENE GLYCOL 3350 17 G/17G
17 POWDER, FOR SOLUTION ORAL DAILY PRN
Status: DISCONTINUED | OUTPATIENT
Start: 2024-06-17 | End: 2024-06-21 | Stop reason: HOSPADM

## 2024-06-17 RX ORDER — ONDANSETRON 2 MG/ML
4 INJECTION INTRAMUSCULAR; INTRAVENOUS EVERY 6 HOURS PRN
Status: DISCONTINUED | OUTPATIENT
Start: 2024-06-17 | End: 2024-06-21 | Stop reason: HOSPADM

## 2024-06-17 RX ORDER — ONDANSETRON 4 MG/1
4 TABLET, ORALLY DISINTEGRATING ORAL EVERY 8 HOURS PRN
Status: DISCONTINUED | OUTPATIENT
Start: 2024-06-17 | End: 2024-06-21 | Stop reason: HOSPADM

## 2024-06-17 RX ORDER — HEPARIN SODIUM 5000 [USP'U]/ML
5000 INJECTION, SOLUTION INTRAVENOUS; SUBCUTANEOUS EVERY 8 HOURS SCHEDULED
Status: DISCONTINUED | OUTPATIENT
Start: 2024-06-17 | End: 2024-06-21

## 2024-06-17 RX ORDER — REGADENOSON 0.08 MG/ML
0.4 INJECTION, SOLUTION INTRAVENOUS
Status: COMPLETED | OUTPATIENT
Start: 2024-06-17 | End: 2024-06-17

## 2024-06-17 RX ORDER — SODIUM CHLORIDE 9 MG/ML
INJECTION, SOLUTION INTRAVENOUS PRN
Status: DISCONTINUED | OUTPATIENT
Start: 2024-06-17 | End: 2024-06-21 | Stop reason: HOSPADM

## 2024-06-17 RX ORDER — ACETAMINOPHEN 650 MG/1
650 SUPPOSITORY RECTAL EVERY 6 HOURS PRN
Status: DISCONTINUED | OUTPATIENT
Start: 2024-06-17 | End: 2024-06-21 | Stop reason: HOSPADM

## 2024-06-17 RX ORDER — 0.9 % SODIUM CHLORIDE 0.9 %
250 INTRAVENOUS SOLUTION INTRAVENOUS ONCE
Status: COMPLETED | OUTPATIENT
Start: 2024-06-17 | End: 2024-06-17

## 2024-06-17 RX ADMIN — CYANOCOBALAMIN TAB 1000 MCG 1000 MCG: 1000 TAB at 12:11

## 2024-06-17 RX ADMIN — TETROFOSMIN 33 MILLICURIE: 1.38 INJECTION, POWDER, LYOPHILIZED, FOR SOLUTION INTRAVENOUS at 10:12

## 2024-06-17 RX ADMIN — TRAMADOL HYDROCHLORIDE 50 MG: 50 TABLET ORAL at 12:11

## 2024-06-17 RX ADMIN — CALCIUM ACETATE 667 MG: 667 CAPSULE ORAL at 12:11

## 2024-06-17 RX ADMIN — SODIUM CHLORIDE 250 ML: 9 INJECTION, SOLUTION INTRAVENOUS at 10:12

## 2024-06-17 RX ADMIN — HEPARIN SODIUM 5000 UNITS: 5000 INJECTION INTRAVENOUS; SUBCUTANEOUS at 14:07

## 2024-06-17 RX ADMIN — SODIUM CHLORIDE, PRESERVATIVE FREE 10 ML: 5 INJECTION INTRAVENOUS at 12:11

## 2024-06-17 RX ADMIN — CALCIUM ACETATE 667 MG: 667 CAPSULE ORAL at 19:09

## 2024-06-17 RX ADMIN — REGADENOSON 0.4 MG: 0.4 INJECTION INTRAVENOUS at 10:12

## 2024-06-17 RX ADMIN — Medication 500 MG: at 12:11

## 2024-06-17 RX ADMIN — TETROFOSMIN 11 MILLICURIE: 1.38 INJECTION, POWDER, LYOPHILIZED, FOR SOLUTION INTRAVENOUS at 07:30

## 2024-06-17 RX ADMIN — CHOLECALCIFEROL TAB 25 MCG (1000 UNIT) 1000 UNITS: 25 TAB at 12:11

## 2024-06-17 RX ADMIN — ACETAMINOPHEN 325MG 650 MG: 325 TABLET ORAL at 04:07

## 2024-06-17 ASSESSMENT — PAIN SCALES - GENERAL
PAINLEVEL_OUTOF10: 8
PAINLEVEL_OUTOF10: 0

## 2024-06-17 ASSESSMENT — PAIN DESCRIPTION - LOCATION: LOCATION: GENERALIZED;LEG

## 2024-06-17 ASSESSMENT — PAIN DESCRIPTION - ORIENTATION: ORIENTATION: RIGHT;LEFT

## 2024-06-17 ASSESSMENT — PAIN DESCRIPTION - DESCRIPTORS: DESCRIPTORS: ACHING

## 2024-06-17 NOTE — CARE COORDINATION
CM spoke with patient, CM explained that PT and OT have been ordered and will see  patient for discharge recommendations for SNF or Home with Home Health.   Patient gave preference for [facility] if Home Health is recommended.   Patient said he makes too much money for Medicaid, and refused [facility] to call his daughter for a Medicaid Screening.   CM explained to patient if SNF is recommended, that CM will need to look at SNF with [facility] and that SNF will just be for rehab, and then patient will need to return home with his daughter, due to no Medicaid for LTC facility.   Patient does not remember which Dialysis Center he goes to Outpatient, and CM let patient know that CM would reach out to his daughter.         CM called patient's daughter Karen Interiano [phone], CM left voicemail, asking for a return call to discuss discharge planning, and CM needs to know Dialysis Chair time, and Dialysis Center patient uses.   CM left phone number for a return call.             Lucretia Leung, RN  Case Management [phone]

## 2024-06-17 NOTE — PROGRESS NOTES
Admit Date: 2024    POD 3 Days Post-Op    Procedure:  Procedure(s):  TUNNELED DIALYSIS CATHETER PLACEMENT, REMOVAL OF CURRENT TEMPORARY DIALYSIS CATHETER    Subjective:     Patient seen briefly before being taken down for nuclear med.  Patient awake alert and answers all questions appropriately.  Moves all extremities to command.  Patient states that he rested comfortably and is hoping to go home soon.  Denies any pain or discomfort on this visit.  Patient was able to receive dialysis with no issues via his dialysis groin catheter.  Patient denies any chest pain or shortness of breath.  Denies any dyspnea on exertion.  Reports his appetite is good if we feed him, states that they are always making him nothing by mouth for procedures.  Patient reports that he is spoken with his family is agreeable to proceeding with skilled nursing facility posthospitalization.  Patient and family are on board.    Objective:     Blood pressure (!) 162/68, pulse 63, temperature (!) 96.6 °F (35.9 °C), temperature source Axillary, resp. rate 18, height 1.905 m (6' 3\"), weight 72.6 kg (160 lb), SpO2 96 %.    Temp (24hrs), Av.7 °F (36.5 °C), Min:96.6 °F (35.9 °C), Max:98.4 °F (36.9 °C)      Physical Exam:    General:         Alert, cooperative, no distress  Head:  Normocephalic, without obvious abnormality, atraumatic.  Eyes:   Conjunctivae/corneas clear  Lungs:            Clear to auscultation bilaterally, no wheezes, no rales, no rhonchi  Heart:  Regular rate and rhythm, S1, S2 normal, no murmur, click, rub or gallop.  Abdomen:      Soft, non-tender. Bowel sounds normal.   Extremities:   Extremities normal, trace edema le bl  Skin:    No rashes or lesions visible extremities  Neurologic:     Normal strength, sensation  Labs:   Recent Results (from the past 24 hour(s))   Nuclear stress test with myocardial perfusion    Collection Time: 24 11:44 AM   Result Value Ref Range    Baseline Systolic  mmHg    Baseline

## 2024-06-17 NOTE — OP NOTE
Operative Note      Patient: Aniceto Interiano Sr  YOB: 1945  MRN: [id number]     Date of Procedure: 6/14/2024     Pre-Op Diagnosis Codes:     * ESRD (end stage renal disease) ([facility]) [N18.6]     * AV fistula occlusion, initial encounter ([facility]) [T82.898A]  Current temporary HD catheter in place. Need for tunneled catheter     Post-Op Diagnosis: Same       Procedure:   U/s guided access of the R common femoral vein  Placement of a 33cm Palindrome tunneled dialysis catheter in the R CFV  Removal of L groin Trialysis catheter        Surgeon(s):  Alejandro Agee MD     Assistant:  Surgical Assistant: Dada Ferraro     Anesthesia: Monitor Anesthesia Care     Estimated Blood Loss (mL): 2mL     Complications: None     Specimens:   * No specimens in log *     Implants:  * No implants in log *      Drains: * No LDAs found *     Findings:  Infection Present At Time Of Surgery (PATOS) (choose all levels that have infection present):  No infection present  Other Findings: Brisk flush and aspiration from both R groin TDC ports.      Disposition: to recovery in stable condition.        INDICATIONS: 79 y/o M with ESRD s/p PTA of a L innominate vein stenosis yesterday, with a failed attempt at LUE AVG thrombectomy. L groin temporary HD catheter was placed. Today, plan for tunneled dialysis catheter placement and removal of current temporary dialysis catheter. Risks, benefits and alternatives were discussed with the patient including but not limited to bleeding, infection, injury to surrounding structures including nerves and/or other organs, scar tissue accumulation, need for surveillance, need for further procedures, venous fibrosis/scarring, DVT, pneumothorax with need for chest tube placement, revision/replacement of the catheter to maintain function for dialysis.  The patient expressed understanding after the opportunity to ask questions, and consented to the procedure.     PROCEDURE: The pt was brought to

## 2024-06-17 NOTE — PROGRESS NOTES
Pt not seen for skilled PT due to:    []  RN, MD/DO, NP/PA Hold   []  Refusal  []  Dialysis   []  Medically inappropriate  [x]  Off Unit (Per discussion with ORI Gardiner, patient off unit with nuclear medicine and plans for echo following. Unavailable for PT evaluation at this time.)  Other:     Will f/u later as schedule allows. Thank you.  Beth Mckeon, PT

## 2024-06-17 NOTE — OP NOTE
Operative Note      Patient: Aniceto Interiano Sr  YOB: 1945  MRN: [id number]     Date of Procedure: 6/13/2024     Pre-Op Diagnosis Codes:     * ESRD (end stage renal disease) ([facility]) [N18.6]  Thrombosed LUE brachial axillary AVG  Malfunctioning LIJ TDC     Post-Op Diagnosis: Same         Procedures:   - U/s guided access of the L CFV and placement of a 4 F microcatheter for IV access  - U/s guided access of the LUE AVG (two separate access points)  - Balloon angioplasty of the AVG with a 4mm and 7mm [place] balloon  - Balloon thrombectomy of the LUE AVG with a 4mm PTA balloon  - Complete fistulogram with central venogram  - Balloon angioplasty of L innominate vein stenosis with a 7 x 40 PTA balloon, 9 x 40 Athletis balloon, 10 x 40 Athletis balloon, and 12 x 20 Athletis balloon.   - Removal of tunneled dialysis catheter from the LIJ vein  - U/s guided access of the R CFV  - Intravascular ultrasound of the L axillary, subclavian, and innominate vein  - Placement of a temporary HD Trialysis catheter in the L CFV     Surgeon(s):  Alejandro Agee MD     Assistant:  * No surgical staff found *     Anesthesia: Local and IV sedation     Estimated Blood Loss (mL): 250mL     Complications: None     Specimens:   * No specimens in log *     Implants:  * No implants in log *      Drains: * No LDAs found *     Findings:  Infection Present At Time Of Surgery (PATOS) (choose all levels that have infection present):  No infection present  Other Findings: 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 Improved w/ PTA to approx 40-50%  Occluded LUE AVG. Apparent stenosis within the graft in the central segment, which was present on imaging at the time of access. Patent arterial anastomosis without stenosis. Patent venous anastomosis without apparent stenosis. Unable to clear all of the thrombus from within the graft.   L groin temp catheter with brisk flush and aspiration from all ports.      Disposition: to

## 2024-06-17 NOTE — PROGRESS NOTES
Bedside and Verbal shift change report given to ORI Tovar (oncoming nurse) by ORI Stubbs (offgoing nurse). Report included the following information Nurse Handoff Report, Intake/Output, MAR, Recent Results, Med Rec Status, and Cardiac Rhythm A fib .      > Pt is A&O x4.  > VS: T 97.4; /79; HR 54; O2Sat 91%  > He complains of pain on right & left leg    2328: Reassessment. No changed from previous assessment.      0407: Pt is given a CHG bath. Complete bedding changed and gown changed.     Bedside and Verbal shift change report given to ORI Gardiner (oncoming nurse) by ORI Tovar (offgoing nurse). Report included the following information Nurse Handoff Report, Intake/Output, MAR, Recent Results, Med Rec Status, and Cardiac Rhythm A fib with SVR .

## 2024-06-17 NOTE — PLAN OF CARE
breakfast.”    OBJECTIVE DATA SUMMARY:     Past Medical History:   Diagnosis Date    Atrial fibrillation (HCC)     DVT (deep venous thrombosis) (HCC)     ESRD (end stage renal disease) (HCC)     Hemodialysis patient (HCC)     [place] (hard of hearing)     Hx of blood clots     Hypertension      Past Surgical History:   Procedure Laterality Date    DIALYSIS FISTULA CREATION Left 4/19/2024    LEFT UPPER EXTREMITY BRACHIAL-AXILLARY ARTERIOVENOUS GRAFT CREATION performed by Alejandro Agee MD at [facility] CARDIAC SURGERY    EYE SURGERY      INVASIVE VASCULAR N/A 1/30/2024    Ultrasound guided vascular access performed by Serjio Anaya MD at [facility] CARDIAC CATH LAB    INVASIVE VASCULAR N/A 4/25/2024    Tunnel dialysis catheter exchange performed by Ricky Martinez MD at [facility] CARDIAC CATH LAB    LEG SURGERY Left 12/15/2023    LEFT LEG WOUND DEBRIDEMENT WITH SKIN GRAFT APPLICATION WITH KERECIS performed by Serjio Anaya MD at [facility] CARDIAC SURGERY    PORT SURGERY N/A 4/19/2024    TUNNELED DIALYSIS CATHETER EXCHANGE; C-ARM performed by Alejandro Agee MD at [facility] CARDIAC SURGERY    PORT SURGERY N/A 6/14/2024    TUNNELED DIALYSIS CATHETER PLACEMENT, REMOVAL OF CURRENT TEMPORARY DIALYSIS CATHETER performed by Alejandro Agee MD at [facility] CARDIAC SURGERY    VASCULAR SURGERY         Home Situation:  Social/Functional History  Lives With: Daughter  Type of Home: Apartment  Home Layout: One level  Home Access: Level entry  Bathroom Shower/Tub: Tub/Shower unit  Bathroom Toilet: Standard  Bathroom Equipment: Shower chair  Bathroom Accessibility: Accessible  Home Equipment: Rollator, Wheelchair - Manual, Cane  Receives Help From: Family  ADL Assistance: Needs assistance  Bath: Moderate assistance  Dressing: Moderate assistance  Grooming: Moderate assistance  Feeding: Moderate assistance  Toileting: Needs assistance  Homemaking Assistance: Needs assistance  Meal Prep: Moderate  Driving: Maximal  Shopping:  Moderate  Homemaking Responsibilities: No  Ambulation Assistance: Independent  Transfer Assistance: Independent  Active : No  Patient's  Info: Family  Mode of Transportation: Family  Occupation: Retired  Critical Behavior:  Orientation  Overall Orientation Status: Within Functional Limits  Cognition  Overall Cognitive Status: WFL    Strength:    Strength: Generally decreased, functional    Tone & Sensation:   Tone: Normal  Sensation: Intact    Range Of Motion:  AROM: Within functional limits       Functional Mobility:  Bed Mobility:     Bed Mobility Training  Bed Mobility Training: Yes  Supine to Sit: Stand-by assistance  Scooting: Stand-by assistance  Transfers:     Transfer Training  Transfer Training: Yes  Interventions: Verbal cues;Tactile cues (cues for hand placement, forward weight shift)  Sit to Stand: Contact-guard assistance  Stand to Sit: Contact-guard assistance  Stand Pivot Transfers: Contact-guard assistance  Balance:         Balance  Sitting: Intact  Standing: Impaired  Standing - Static: Good  Standing - Dynamic: Fair    Ambulation/Gait Training:     Gait  Gait Training: Yes  Overall Level of Assistance: Contact-guard assistance  Distance (ft): 15 Feet  Assistive Device: Walker, rolling  Base of Support: Narrowed  Speed/Janice: Pace decreased (< 100 feet/min)  Gait Abnormalities: Step to gait    Pain:  Intensity Pre-treatment: 0/10   Intensity Post-treatment: 0/10  Scale: Numeric Rating Scale    Activity Tolerance:   Activity Tolerance: Patient tolerated evaluation without incident  Please refer to the flowsheet for vital signs taken during this treatment.    After treatment:   [x]         Patient left in no apparent distress sitting up in chair  []         Patient left in no apparent distress in bed  [x]         Call bell left within reach  [x]         Nursing notified  [x]         OT present  [x]         chair alarm activated  []         SCDs applied    COMMUNICATION/EDUCATION:

## 2024-06-17 NOTE — PROGRESS NOTES
[facility] - Progress Note    Admit Date: 6/13/2024  Attending Cardiologist: Dr. Fernando    Assessment:     -Afib, controlled/slow ventricular response, on low-dose Eliquis for anticoagulation as outpatient, not on AV анна blocking agents  -ESRD, on HD  -HTN, on Amlodipine as outpatient  -Hx DVT    Primary cardiologist is Dr. Neeraj Martinez, recently established    Plan:     -Will proceed with nuclear stress test as ordered.  -Will order Echocardiogram for completeness.  -Continued on Amlodipine for HTN.  -Further recommendations based on test results.    Subjective:     No new complaints.     Objective:      Patient Vitals for the past 8 hrs:   Temp Pulse Resp BP SpO2   06/17/24 1022 -- 55 -- (!) 167/83 --   06/17/24 0750 97.8 °F (36.6 °C) 50 18 (!) 151/82 98 %   06/17/24 0345 97.6 °F (36.4 °C) (!) 46 19 124/64 98 %         Patient Vitals for the past 96 hrs:   Weight   06/17/24 1022 72.6 kg (160 lb)   06/15/24 0406 72.8 kg (160 lb 9.6 oz)   06/13/24 1659 75.2 kg (165 lb 12.8 oz)   06/13/24 1346 79.9 kg (176 lb 1.6 oz)          Current Facility-Administered Medications   Medication Dose Route Frequency    sodium chloride 0.9 % bolus 250 mL  250 mL IntraVENous Once    perflutren lipid microspheres (DEFINITY) injection 2 mL  2 mL IntraVENous Once    sodium chloride flush 0.9 % injection 5-40 mL  5-40 mL IntraVENous 2 times per day    sodium chloride flush 0.9 % injection 5-40 mL  5-40 mL IntraVENous PRN    0.9 % sodium chloride infusion   IntraVENous PRN    heparin (porcine) injection 5,000 Units  5,000 Units SubCUTAneous 3 times per day    ondansetron (ZOFRAN-ODT) disintegrating tablet 4 mg  4 mg Oral Q8H PRN    Or    ondansetron (ZOFRAN) injection 4 mg  4 mg IntraVENous Q6H PRN    polyethylene glycol (GLYCOLAX) packet 17 g  17 g Oral Daily PRN    acetaminophen (TYLENOL) tablet 650 mg  650 mg Oral Q6H PRN    Or    acetaminophen (TYLENOL) suppository 650 mg  650 mg Rectal Q6H PRN    traMADol (ULTRAM) tablet

## 2024-06-17 NOTE — PLAN OF CARE
Problem: Pain  Goal: Verbalizes/displays adequate comfort level or baseline comfort level  6/17/2024 0037 by La Andino RN  Outcome: Progressing  Flowsheets  Taken 6/16/2024 2328  Verbalizes/displays adequate comfort level or baseline comfort level:   Encourage patient to monitor pain and request assistance   Assess pain using appropriate pain scale   Implement non-pharmacological measures as appropriate and evaluate response  Taken 6/16/2024 1945  Verbalizes/displays adequate comfort level or baseline comfort level:   Encourage patient to monitor pain and request assistance   Assess pain using appropriate pain scale   Implement non-pharmacological measures as appropriate and evaluate response  6/16/2024 1829 by Evelyne Brewer RN  Outcome: Progressing     Problem: Discharge Planning  Goal: Discharge to home or other facility with appropriate resources  6/17/2024 0037 by La Andino RN  Outcome: Progressing  Flowsheets (Taken 6/16/2024 1945)  Discharge to home or other facility with appropriate resources:   Identify barriers to discharge with patient and caregiver   Arrange for needed discharge resources and transportation as appropriate   Identify discharge learning needs (meds, wound care, etc)  6/16/2024 1829 by Evelyne Brewer RN  Outcome: Progressing     Problem: Chronic Conditions and Co-morbidities  Goal: Patient's chronic conditions and co-morbidity symptoms are monitored and maintained or improved  Outcome: Progressing  Flowsheets (Taken 6/16/2024 1945)  Care Plan - Patient's Chronic Conditions and Co-Morbidity Symptoms are Monitored and Maintained or Improved:   Monitor and assess patient's chronic conditions and comorbid symptoms for stability, deterioration, or improvement   Collaborate with multidisciplinary team to address chronic and comorbid conditions and prevent exacerbation or deterioration   Update acute care plan with appropriate goals if chronic or comorbid symptoms are

## 2024-06-17 NOTE — PLAN OF CARE
Problem: Pain  Goal: Verbalizes/displays adequate comfort level or baseline comfort level  Outcome: Progressing  Flowsheets (Taken 6/17/2024 0345 by La Andino RN)  Verbalizes/displays adequate comfort level or baseline comfort level:   Encourage patient to monitor pain and request assistance   Assess pain using appropriate pain scale   Implement non-pharmacological measures as appropriate and evaluate response     Problem: Discharge Planning  Goal: Discharge to home or other facility with appropriate resources  Outcome: Progressing  Flowsheets (Taken 6/17/2024 0730)  Discharge to home or other facility with appropriate resources: Identify barriers to discharge with patient and caregiver     Problem: Chronic Conditions and Co-morbidities  Goal: Patient's chronic conditions and co-morbidity symptoms are monitored and maintained or improved  Outcome: Progressing  Flowsheets (Taken 6/17/2024 0730)  Care Plan - Patient's Chronic Conditions and Co-Morbidity Symptoms are Monitored and Maintained or Improved:   Monitor and assess patient's chronic conditions and comorbid symptoms for stability, deterioration, or improvement   Collaborate with multidisciplinary team to address chronic and comorbid conditions and prevent exacerbation or deterioration     Problem: Safety - Adult  Goal: Free from fall injury  Outcome: Progressing     Problem: Physical Therapy - Adult  Goal: By Discharge: Performs mobility at highest level of function for planned discharge setting.  See evaluation for individualized goals.  Description: Physical Therapy Goals:  Initiated 6/17/2024 to be met within 7-10 days.    1.  Patient will move from supine to sit and sit to supine , scoot up and down, and roll side to side in bed with independence in order to safely get into/out of bed.    2.  Patient will transfer from bed to chair and chair to bed with supervision/set-up using RW in order to safely partake in OOB mobility.  3.  Patient will

## 2024-06-17 NOTE — PLAN OF CARE
INTERVENTION:  HEMODYNAMIC STABILIZATION  MAINTAIN BP WNL WHILE ON HD.    INTERVENTION:  FLUID MANAGEMENT  WILL ATTEMPT 1500 ML TOTAL FLUID REMOVAL AS TOLERATED.    INTERVENTION:  METABOLIC/ELECTROLYTE MANAGEMENT  2.0 POTASSIUM 2.5 CALCIUM DIALYSATE USED WITH HD TODAY.    INTERVENTION:  HEMODIALYSIS ACCESS SITE MANAGEMENT  RIGHT FEMORAL CVC ACCESSED USING ASEPTIC TECHNIQUE.    GOAL:  SIGNS AND SYMPTOMS OF LISTED POTENTIAL PROBLEMS WILL BE ABSENT OR MANAGEABLE.    OUTCOME:  PROGRESSING.    HD PLANNED FOR 3 HOURS TODAY.      Problem: Chronic Conditions and Co-morbidities  Goal: Patient's chronic conditions and co-morbidity symptoms are monitored and maintained or improved  6/17/2024 1518 by Alma Guzman RN  Outcome: Progressing  Flowsheets (Taken 6/17/2024 1518)  Care Plan - Patient's Chronic Conditions and Co-Morbidity Symptoms are Monitored and Maintained or Improved:   Collaborate with multidisciplinary team to address chronic and comorbid conditions and prevent exacerbation or deterioration   Monitor and assess patient's chronic conditions and comorbid symptoms for stability, deterioration, or improvement   Update acute care plan with appropriate goals if chronic or comorbid symptoms are exacerbated and prevent overall improvement and discharge

## 2024-06-17 NOTE — CARE COORDINATION
PT and OT recommending Home with Home Health.       CM called and spoke with patient's daughter Karen Interiano [phone], and updated that PT and OT are recommending Home with Home Health.   Patient's daughter is agreeable to patient returning home at time of discharge, and said she will be transporting patient home at time of discharge.         CM will need Home Health orders for patient when possible.         CM spoke with patient at the bedside and updated that PT and OT are recommending Home with Home Health for patient.   Patient previously gave FOC verbal consent for [facility].   Patient said he will see if his Cousin will let patient stay with him.   CM let patient know that patient will need to discharge when medically stable to discharge, whether it is back to his daughter's home, or to his cousin's home.           Lucretia Leung, RN  Case Management [phone]

## 2024-06-17 NOTE — PROGRESS NOTES
In Patient Progress note      Admit Date: 6/13/2024      Impression:      1) ESRD HD MWF schedule , access AVF which is thrombosed , has a TDC for hd     2) AVF thrombosed, needs fistulogram  and thrombectomy    3) Anemia of CKD    4) thrombocytopenia     5) sec hyperpara    Plan:    1) HD MWF schedule  2) no RAKAN   3) BP : avoid too tight BP control , goal 130-140s systolic   4) fluid restriction 40 oz/day  5) check pth and phos    Discussed with nursing    Please call with questions    Angel Magallanes MD FASN  Cell [phone]  Pager: [phone]  Fax   [phone]       Subjective:     - No acute over night events.  - respiratory - stable  - hemodynamics - stable, no pressrs  - UOP-poor  - Nutrition -FTT    Objective:     BP (!) 151/82   Pulse 63   Temp (!) 96.6 °F (35.9 °C) (Axillary)   Resp 18   Ht 1.905 m (6' 3\")   Wt 73 kg (161 lb)   SpO2 96%   BMI 20.12 kg/m²       Intake/Output Summary (Last 24 hours) at 6/17/2024 1402  Last data filed at 6/17/2024 1242  Gross per 24 hour   Intake 1040 ml   Output --   Net 1040 ml       Physical Exam:     Gen NAD  HENT mmm  RS AEBE   CVS s1s2 wnl no JVD  Ext edema +  Skin no rashes  Neuro oriented X 3     Data Review:    No results for input(s): \"WBC\", \"RBC\", \"HCT\", \"MCV\", \"MCH\", \"MCHC\", \"RDW\", \"PLATELET\", \"MPV\" in the last 72 hours.    Invalid input(s): \"HEMOGLOBIN\"  No results for input(s): \"BUN\", \"K\", \"NA\", \"CL\", \"CO2\", \"PHOS\", \"GLU\" in the last 72 hours.    Invalid input(s): \"CREA\", \"CA\", \"ALB\"    Angel Magallanes MD

## 2024-06-17 NOTE — PLAN OF CARE
Info: Family  Mode of Transportation: Family  Occupation: Retired  [x]  Right hand dominant   []  Left hand dominant    Cognitive/Behavioral Status:  Orientation  Overall Orientation Status: Within Functional Limits  Orientation Level: Oriented to place;Oriented to situation;Oriented to person  Cognition  Overall Cognitive Status: WFL    Skin: Intact  Edema: None noted    Vision/Perceptual:    Vision  Vision: Impaired  Vision Exceptions: Legally blind (Blind, R eye)        Coordination: BUE  Coordination: Within functional limits        Balance:     Balance  Sitting: Intact  Standing: Impaired  Standing - Static: Good  Standing - Dynamic: Fair    Strength: BUE  Strength: Generally decreased, functional    Tone & Sensation: BUE  Tone: Normal  Sensation: Intact    Range of Motion: BUE  AROM: Within functional limits    Functional Mobility and Transfers for ADLs:  Bed Mobility:  Bed Mobility Training  Bed Mobility Training: No      Transfers:  Transfer Training  Transfer Training: Yes  Interventions: Verbal cues;Tactile cues (cues for hand placement, forward weight shift)  Sit to Stand: Contact-guard assistance  Stand to Sit: Stand-by assistance    ADL Assessment:   Feeding: Independent  Grooming: Modified independent ;Contact guard assistance ([facility] standing)  UE Bathing: Modified independent   LE Bathing: Contact guard assistance ([facility] standing)  UE Dressing: Modified independent   LE Dressing: Contact guard assistance ([facility] standing)  Toileting: Contact guard assistance    Pain:  Intensity Pre-treatment: 0/10   Intensity Post-treatment: 0/10    Activity Tolerance:   Activity Tolerance: Patient Tolerated treatment well  Please refer to the flowsheet for vital signs taken during this treatment.    After treatment:   [x] Patient left in no apparent distress sitting up in chair  [] Patient left in no apparent distress in bed  [x] Call bell left within reach  [x] Nursing notified  [] Caregiver present  [] Bed alarm  activated    COMMUNICATION/EDUCATION:   Patient Education  Education Given To: Patient  Education Provided: Role of Therapy;Plan of Care;Fall Prevention Strategies;Precautions;ADL Adaptive Strategies;Equipment;Transfer Training;Orientation  Education Method: Demonstration;Verbal;Teach Back  Barriers to Learning: Hearing  Education Outcome: Verbalized understanding;Continued education needed    Thank you for this referral.  Robert Walls OTR/L  Minutes: 16    Eval Complexity: Decision Making: Medium Complexity

## 2024-06-17 NOTE — PROGRESS NOTES
Received pre HD report from  TAMARA Garcia, RN.      Pt in bed, A+O x3 has some confusion, no s/s of distress noted.      PCT accessed right femoral CVC w/o complications.  Tx initiated at 1506.      CVC flowing with ease.  For hemodynamic stability UF goal 1500 ml.  Offered assistance with repositioning every 2 hours.  Vascular access visible at all times during treatment, line connections intact at all times.      Tx completed at 1808, tolerated well 1L removed.  De-accessed per protocol.    Heparin indwell 2.3ml in arterial, and 2.2ml in venous catheter.      Patient returned to in no acute distress, unit nurse TAMARA Garcia, RN given report.

## 2024-06-17 NOTE — CARE COORDINATION
CM uploaded clinicals to [facility] for possible SNF placement.   CM will send out SNF referrals in [facility] once PT and OT have made their recommendations.         YUVAL Perfect Served [place] Manager of Therapy, that new PT and OT orders are in for patient, and to see if they can patient today for discharge planning needs.               Lucretia Leung, RN  Case Management [phone]

## 2024-06-17 NOTE — CARE COORDINATION
Yoana BARBOZA CM updated CM that patient wants SNF if possible.         UYVAL Teran Served Dr Agee and updated, and that patient will need PT and OT orders, and patient changed to Inpatient status if possible for SNF.       If PT and OT recommend SNF, patient will need auth for SNF.                 Lucretia Leung, RN  Case Management [phone]

## 2024-06-17 NOTE — PROGRESS NOTES
0725- Bedside and Verbal shift change report given to ORI Gardiner (oncoming nurse) by ORI Tovar (offgoing nurse). Report included the following information SBAR, Intake/Output, MAR, Recent Results, and Cardiac Rhythm AFIB .     0825- Patient transported off unit via wheelchair to nuclear medicine.    1030- Patient transported back to unit via wheelchair from nuclear medicine.     1130- Patient transported off unit via wheelchair to nuclear medicine.    1200- Patient transported back to unit via wheelchair from nuclear medicine.     1355- Report given to Alma REHMAN in dialysis. Transport to be placed.    1445- Patient transported off floor via bed to dialysis.    1819- Report received from Alma REHMAN in dialysis- 1L removed, /64, HR 55, 97.1 temp. Transport request in progress.    1850- Patient transported back to unit via bed from dialysis.     1915- Patient removed Right Femoral TDC dressing, new dressing applied per flowsheets.    1920- Bedside and Verbal shift change report given to ORI Mckeon (oncoming nurse) by ORI Gardiner (offgoing nurse). Report included the following information SBAR, Intake/Output, MAR, Recent Results, and Cardiac Rhythm AFIB.

## 2024-06-17 NOTE — CARE COORDINATION
Patient's daughter Karen Interiano [phone] called CM back, said patient goes to Dialysis at [facility] on [address] in [place] [facility] M/W/F, Chair time at 11 am, patient's daughter or patient's granddaughter transports patient.   CM explained that PT and OT to see patient for discharge recommendations, SNF or Home with Home Health.   Patient's daughter said patient can return home if Home Health is recommended.   Patient's daughter updated that patient refused Medicaid Screening due to patient said he makes too much money.   Patient's daughter said patient makes $ 1700 a month.   Patient's daughter said someone from [facility] APS came to patient's daughter's home on Friday to visit.   Patient's daughter said she is unaware if an APS case is open for patient or not, but Friday is the first time someone has come out to the home.   Patient's daughter let CM know that patient's daughter works full time, and patient does receive 3 meals a day, and that patient is taken care of at home, and that patient has 4 other children who also participate in patient's care at home.             Lucretia Leung, RN  Case Management [phone]

## 2024-06-18 LAB
ABO + RH BLD: NORMAL
ALBUMIN SERPL-MCNC: 3.3 G/DL (ref 3.4–5)
ALBUMIN/GLOB SERPL: 0.6 (ref 0.8–1.7)
ALP SERPL-CCNC: 104 U/L (ref 45–117)
ALT SERPL-CCNC: <6 U/L (ref 16–61)
AMMONIA PLAS-SCNC: 12 UMOL/L (ref 11–32)
ANION GAP SERPL CALC-SCNC: 9 MMOL/L (ref 3–18)
AST SERPL-CCNC: 18 U/L (ref 10–38)
BASE EXCESS BLD CALC-SCNC: 2.5 MMOL/L
BASOPHILS # BLD: 0 K/UL (ref 0–0.1)
BASOPHILS NFR BLD: 1 % (ref 0–2)
BDY SITE: ABNORMAL
BILIRUB SERPL-MCNC: 0.7 MG/DL (ref 0.2–1)
BLD PROD TYP BPU: NORMAL
BLD PROD TYP BPU: NORMAL
BLOOD BANK DISPENSE STATUS: NORMAL
BLOOD BANK DISPENSE STATUS: NORMAL
BLOOD GROUP ANTIBODIES SERPL: NORMAL
BODY TEMPERATURE: 98.1
BPU ID: NORMAL
BPU ID: NORMAL
BUN SERPL-MCNC: 34 MG/DL (ref 7–18)
BUN/CREAT SERPL: 4 (ref 12–20)
CALCIUM SERPL-MCNC: 10.4 MG/DL (ref 8.5–10.1)
CHLORIDE SERPL-SCNC: 98 MMOL/L (ref 100–111)
CO2 SERPL-SCNC: 27 MMOL/L (ref 21–32)
CREAT SERPL-MCNC: 7.85 MG/DL (ref 0.6–1.3)
CROSSMATCH RESULT: NORMAL
CROSSMATCH RESULT: NORMAL
DIFFERENTIAL METHOD BLD: ABNORMAL
EOSINOPHIL # BLD: 0.4 K/UL (ref 0–0.4)
EOSINOPHIL NFR BLD: 5 % (ref 0–5)
ERYTHROCYTE [DISTWIDTH] IN BLOOD BY AUTOMATED COUNT: 13.2 % (ref 11.6–14.5)
GAS FLOW.O2 O2 DELIVERY SYS: ABNORMAL
GLOBULIN SER CALC-MCNC: 5.2 G/DL (ref 2–4)
GLUCOSE BLD STRIP.AUTO-MCNC: 80 MG/DL (ref 70–110)
GLUCOSE SERPL-MCNC: 103 MG/DL (ref 74–99)
HCO3 BLD-SCNC: 25.7 MMOL/L (ref 22–26)
HCT VFR BLD AUTO: 35.5 % (ref 36–48)
HGB BLD-MCNC: 11.4 G/DL (ref 13–16)
IMM GRANULOCYTES # BLD AUTO: 0 K/UL (ref 0–0.04)
IMM GRANULOCYTES NFR BLD AUTO: 0 % (ref 0–0.5)
LACTATE BLD-SCNC: 0.78 MMOL/L (ref 0.4–2)
LACTATE SERPL-SCNC: 1.6 MMOL/L (ref 0.4–2)
LYMPHOCYTES # BLD: 4.6 K/UL (ref 0.9–3.6)
LYMPHOCYTES NFR BLD: 54 % (ref 21–52)
MCH RBC QN AUTO: 30.8 PG (ref 24–34)
MCHC RBC AUTO-ENTMCNC: 32.1 G/DL (ref 31–37)
MCV RBC AUTO: 95.9 FL (ref 78–100)
MONOCYTES # BLD: 0.7 K/UL (ref 0.05–1.2)
MONOCYTES NFR BLD: 8 % (ref 3–10)
NEUTS SEG # BLD: 2.7 K/UL (ref 1.8–8)
NEUTS SEG NFR BLD: 32 % (ref 40–73)
NRBC # BLD: 0 K/UL (ref 0–0.01)
NRBC BLD-RTO: 0 PER 100 WBC
O2/TOTAL GAS SETTING VFR VENT: 32 %
PCO2 BLD: 33.2 MMHG (ref 35–45)
PH BLD: 7.5 (ref 7.35–7.45)
PLATELET # BLD AUTO: 132 K/UL (ref 135–420)
PMV BLD AUTO: 11.9 FL (ref 9.2–11.8)
PO2 BLD: 133 MMHG (ref 80–100)
POTASSIUM SERPL-SCNC: 4.7 MMOL/L (ref 3.5–5.5)
PROT SERPL-MCNC: 8.5 G/DL (ref 6.4–8.2)
RBC # BLD AUTO: 3.7 M/UL (ref 4.35–5.65)
RESPIRATORY RATE, POC: 17 (ref 5–40)
SAO2 % BLD: 99.3 % (ref 92–97)
SERVICE CMNT-IMP: ABNORMAL
SODIUM SERPL-SCNC: 134 MMOL/L (ref 136–145)
SPECIMEN EXP DATE BLD: NORMAL
SPECIMEN TYPE: ABNORMAL
UNIT DIVISION: 0
UNIT DIVISION: 0
WBC # BLD AUTO: 8.4 K/UL (ref 4.6–13.2)

## 2024-06-18 PROCEDURE — 93005 ELECTROCARDIOGRAM TRACING: CPT | Performed by: STUDENT IN AN ORGANIZED HEALTH CARE EDUCATION/TRAINING PROGRAM

## 2024-06-18 PROCEDURE — 2500000003 HC RX 250 WO HCPCS: Performed by: PHYSICIAN ASSISTANT

## 2024-06-18 PROCEDURE — 6360000002 HC RX W HCPCS: Performed by: PHYSICIAN ASSISTANT

## 2024-06-18 PROCEDURE — 85025 COMPLETE CBC W/AUTO DIFF WBC: CPT

## 2024-06-18 PROCEDURE — 80053 COMPREHEN METABOLIC PANEL: CPT

## 2024-06-18 PROCEDURE — 6370000000 HC RX 637 (ALT 250 FOR IP): Performed by: SURGERY

## 2024-06-18 PROCEDURE — 82140 ASSAY OF AMMONIA: CPT

## 2024-06-18 PROCEDURE — 6370000000 HC RX 637 (ALT 250 FOR IP): Performed by: PHYSICIAN ASSISTANT

## 2024-06-18 PROCEDURE — 2140000001 HC CVICU INTERMEDIATE R&B

## 2024-06-18 PROCEDURE — 83605 ASSAY OF LACTIC ACID: CPT

## 2024-06-18 PROCEDURE — 82803 BLOOD GASES ANY COMBINATION: CPT

## 2024-06-18 PROCEDURE — 94761 N-INVAS EAR/PLS OXIMETRY MLT: CPT

## 2024-06-18 PROCEDURE — 82962 GLUCOSE BLOOD TEST: CPT

## 2024-06-18 PROCEDURE — 2580000003 HC RX 258: Performed by: PHYSICIAN ASSISTANT

## 2024-06-18 PROCEDURE — 36415 COLL VENOUS BLD VENIPUNCTURE: CPT

## 2024-06-18 RX ORDER — ATROPINE SULFATE 1 MG/ML
1 INJECTION, SOLUTION INTRAMUSCULAR; INTRAVENOUS; SUBCUTANEOUS EVERY 5 MIN PRN
Status: DISCONTINUED | OUTPATIENT
Start: 2024-06-18 | End: 2024-06-21 | Stop reason: HOSPADM

## 2024-06-18 RX ADMIN — CHOLECALCIFEROL TAB 25 MCG (1000 UNIT) 1000 UNITS: 25 TAB at 09:03

## 2024-06-18 RX ADMIN — SODIUM CHLORIDE, PRESERVATIVE FREE 10 ML: 5 INJECTION INTRAVENOUS at 00:18

## 2024-06-18 RX ADMIN — Medication 500 MG: at 09:02

## 2024-06-18 RX ADMIN — CALCIUM ACETATE 667 MG: 667 CAPSULE ORAL at 11:59

## 2024-06-18 RX ADMIN — HEPARIN SODIUM 5000 UNITS: 5000 INJECTION INTRAVENOUS; SUBCUTANEOUS at 15:37

## 2024-06-18 RX ADMIN — CALCIUM ACETATE 667 MG: 667 CAPSULE ORAL at 09:02

## 2024-06-18 RX ADMIN — CALCIUM ACETATE 667 MG: 667 CAPSULE ORAL at 17:15

## 2024-06-18 RX ADMIN — HEPARIN SODIUM 5000 UNITS: 5000 INJECTION INTRAVENOUS; SUBCUTANEOUS at 06:47

## 2024-06-18 RX ADMIN — SODIUM CHLORIDE, PRESERVATIVE FREE 10 ML: 5 INJECTION INTRAVENOUS at 20:00

## 2024-06-18 RX ADMIN — APIXABAN 2.5 MG: 2.5 TABLET, FILM COATED ORAL at 09:02

## 2024-06-18 RX ADMIN — TRAMADOL HYDROCHLORIDE 50 MG: 50 TABLET ORAL at 09:02

## 2024-06-18 RX ADMIN — CYANOCOBALAMIN TAB 1000 MCG 1000 MCG: 1000 TAB at 09:03

## 2024-06-18 RX ADMIN — AMLODIPINE BESYLATE 10 MG: 10 TABLET ORAL at 09:02

## 2024-06-18 RX ADMIN — APIXABAN 2.5 MG: 2.5 TABLET, FILM COATED ORAL at 22:03

## 2024-06-18 RX ADMIN — SODIUM CHLORIDE, PRESERVATIVE FREE 10 ML: 5 INJECTION INTRAVENOUS at 09:03

## 2024-06-18 ASSESSMENT — PAIN SCALES - GENERAL
PAINLEVEL_OUTOF10: 0
PAINLEVEL_OUTOF10: 8

## 2024-06-18 NOTE — PLAN OF CARE
Problem: Pain  Goal: Verbalizes/displays adequate comfort level or baseline comfort level  6/18/2024 1053 by Viviana Nath RN  Outcome: Progressing  6/18/2024 0613 by Linda Oneil RN  Outcome: Progressing  6/18/2024 0611 by Linda Oneil RN  Outcome: Progressing     Problem: Discharge Planning  Goal: Discharge to home or other facility with appropriate resources  6/18/2024 1053 by Viviana Nath RN  Outcome: Progressing  Flowsheets (Taken 6/18/2024 0726)  Discharge to home or other facility with appropriate resources:   Identify barriers to discharge with patient and caregiver   Arrange for needed discharge resources and transportation as appropriate   Identify discharge learning needs (meds, wound care, etc)   Arrange for interpreters to assist at discharge as needed  6/18/2024 0613 by Lidna Oneil RN  Outcome: Progressing  6/18/2024 0611 by Linda Oneil RN  Outcome: Progressing     Problem: Chronic Conditions and Co-morbidities  Goal: Patient's chronic conditions and co-morbidity symptoms are monitored and maintained or improved  6/18/2024 1053 by Viviana Nath RN  Outcome: Progressing  Flowsheets (Taken 6/18/2024 0726)  Care Plan - Patient's Chronic Conditions and Co-Morbidity Symptoms are Monitored and Maintained or Improved:   Monitor and assess patient's chronic conditions and comorbid symptoms for stability, deterioration, or improvement   Collaborate with multidisciplinary team to address chronic and comorbid conditions and prevent exacerbation or deterioration   Update acute care plan with appropriate goals if chronic or comorbid symptoms are exacerbated and prevent overall improvement and discharge  6/18/2024 0613 by Linda Oneil RN  Outcome: Progressing  6/18/2024 0611 by Linda Oneil RN  Outcome: Progressing     Problem: Safety - Adult  Goal: Free from fall injury  6/18/2024 1053 by Viviana Nath RN  Outcome: Progressing  6/18/2024 0613 by Linda Oneil RN  Outcome: Progressing  6/18/2024 0611 by

## 2024-06-18 NOTE — PROGRESS NOTES
[facility] Vein and Vascular Surgery      No events overnight.        PE:   Blood pressure 121/70, pulse (!) 49, temperature 97.9 °F (36.6 °C), temperature source Oral, resp. rate 21, height 1.905 m (6' 3\"), weight 78.8 kg (173 lb 12.8 oz), SpO2 94 %.      Intake/Output Summary (Last 24 hours) at 6/18/2024 0804  Last data filed at 6/18/2024 0441  Gross per 24 hour   Intake 700 ml   Output 1700 ml   Net -1000 ml       NAD  Breathing comfortably  Alert and oriented however seemed to become confused intermittently as I was speaking to him  BLE warm  L chest TDC access site suture c/d/I  LUE graft without thrill. Sutures intact. There remains quite a bit of local edema around the graft  L groin access site c/d/I. Suture intact      Lab Results   Component Value Date    WBC 5.2 06/14/2024    HGB 11.3 (L) 06/14/2024    HCT 35.3 (L) 06/14/2024    MCV 96.2 06/14/2024    PLT 85 (L) 06/14/2024     Lab Results   Component Value Date/Time     06/14/2024 03:36 AM    K 4.8 06/14/2024 03:36 AM     06/14/2024 03:36 AM    CO2 27 06/14/2024 03:36 AM    BUN 35 06/14/2024 03:36 AM    CREATININE 7.94 06/14/2024 03:36 AM    GLUCOSE 91 06/14/2024 03:36 AM    GLUCOSE 103 07/14/2023 01:05 AM    CALCIUM 9.5 06/14/2024 03:36 AM    LABGLOM 6 06/14/2024 03:36 AM    LABGLOM 8 04/19/2024 06:20 AM      Nuclear stress test yesterday: low risk for cardiac events      IMPRESSION:   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   Currently dialyzing without issue via a R groin TDC  Family and pt requesting SNF upon discharge  Afib, holding eliquis due to possible surgical procedure  Chronic thrombocytopenia      PLAN:   Discharge planning at this point. We will schedule AVG revision in the next 1-2 weeks  Restart eliquis today for afib  Will speak w/ his nephrologist regarding thrombocytopenia and whether or not hematology should be consulted.   Appreciate cardiology eval and recommendations.   Continue HD via

## 2024-06-18 NOTE — SIGNIFICANT EVENT
[facility] Medicine  Rapid/Medical Response Team Note      Patient:    Aniceto Interiano Sr 78 y.o. male, : 1945  Patient MRN: [id number]    Admission Date: 2024   Admission Diagnosis: ESRD (end stage renal disease) ([facility]) [N18.6]  PAD (peripheral artery disease) ([facility]) [I73.9]      RAPID RESPONSE  Rapid response called for: Altered Mental Status    Pt was admitted 2 days ago for ESRD with TDC problems. He has reportedly had some intermittent confusion during his hospital stay so far, but this afternoon his nurse noticed that he seemed significantly more confused than before. Per nursing, he is disoriented to location and time and has been trying to get up out of bed.     On our arrival, pt with stable vitals. Can state his name after a significant delay. When asked where he is, he reports \"I thought I was at [facility], but it seems that that is not the case.\" He denies any pain at this time. He continually talks about his daughter and requests ice to eat.    Initially unable to obtain accurate pulse ox reading due to cold fingers. Eventually placed pulse ox reader on ear which showed sats at 93-94%    OBJECTIVE    RRT/MRT start time:  14:34          RRT/MRT end time: ~15:00  Vitals:    24 1608   BP: 130/81   Pulse:    Resp:    Temp: 98.2 °F (36.8 °C)   SpO2: 96%        Physical Exam:  Gen: NAD, appears comfortable, intermittently trying to get up out of bed  HEENT: normocephalic, atraumatic, MMM; right eye deviated to right at neutral but EOMI  CV: normal rate, irregular rhythm, S1/S2 present without M/R/G  Pulm: CTAB, no wheezes, no crackles; normal work of breathing  Abd: soft, non-distended   MSK: no edema  Skin: warm, dry, intact; chronic venous stasis dermatitis  Neuro: PERRL, EOMI, Speech clear without slurring. Pt unable to cooperate with motor or sensory neuro exam    Psych: confused, alert, oriented to self only, intermittently agitated with people touching him for lab draws but largely

## 2024-06-18 NOTE — PROGRESS NOTES
0727: Bedside and Verbal shift change report given to ORI Ragland (oncoming nurse) by ORI Mckeon (offgoing nurse). Report included the following information Nurse Handoff Report, Adult Overview, Surgery Report, Intake/Output, MAR, Recent Results, and Cardiac Rhythm Afib .      1418: Client observed restless, confused, and attempting to exit bed and leave facility X3. MD contacted and made aware of new onset of confusion.     1420: RRT called for new onset of confusion per oncall MD request.    1421: Vitals assessed as follows /86, P 63, T 98.1, R 20. O2 saturation unreadable.     1422: MD at bedside.    1429: Glucose 80    1430: 2L NC applied.     1431: O2 titrated to 5L. O2 saturations at 93%.    1449: RT at bedside. ABG completed.    1500: RRT completed. No new orders at this time.     1905: Bedside and Verbal shift change report given to ORI Mckeon (oncoming nurse) by ORI Ragland (offgoing nurse). Report included the following information Nurse Handoff Report, Adult Overview, Surgery Report, Intake/Output, MAR, Recent Results, and Cardiac Rhythm Afib.

## 2024-06-18 NOTE — PLAN OF CARE
Problem: Safety - Adult  Goal: Free from fall injury  6/18/2024 0613 by Linda Oneil RN  Outcome: Progressing  6/18/2024 0611 by Linda Oneil RN  Outcome: Progressing     Problem: Chronic Conditions and Co-morbidities  Goal: Patient's chronic conditions and co-morbidity symptoms are monitored and maintained or improved  6/18/2024 0613 by Linda Oneil RN  Outcome: Progressing  6/18/2024 0611 by Linda Oneil RN  Outcome: Progressing     Problem: Discharge Planning  Goal: Discharge to home or other facility with appropriate resources  6/18/2024 0613 by Linda Oneil RN  Outcome: Progressing  6/18/2024 0611 by Linda Oneil RN  Outcome: Progressing     Problem: Pain  Goal: Verbalizes/displays adequate comfort level or baseline comfort level  6/18/2024 0613 by Linda Oneil RN  Outcome: Progressing  6/18/2024 0611 by Linda Oneil RN  Outcome: Progressing

## 2024-06-18 NOTE — PROGRESS NOTES
Bedside and Verbal shift change report given to Linda Oneil RN (oncoming nurse) by Zuleyka Garcia RN (offgoing nurse). Report included the following information Nurse Handoff Report, Adult Overview, and Cardiac Rhythm A fib .     2100:  Patient moved closer to the nurses station d/t restlessness.    0000:  Patient's HR dips into the high 30s at times and stays in the low 40s during sleep.

## 2024-06-18 NOTE — CARE COORDINATION
YUVAL spoke with Karel Aguilar with [facility], said they can take patient.   YUVAL let Karel Aguilar know that patient is a possible discharge for today, and patient is a Dialysis patient M/W/F.   CM put patient in the queue for [facility].         Patient's daughter to transport patient home when medically stable for discharge.             Lucretia Leung RN  Case Management [phone]

## 2024-06-18 NOTE — PROGRESS NOTES
In Patient Progress note      Admit Date: 6/13/2024      Impression:       1) ESRD HD MWF schedule , access AVF which is thrombosed , has a TDC for hd     2) AVF thrombosed, needs fistulogram  and thrombectomy    3) Anemia of CKD    4) thrombocytopenia     5) sec hyperpara    Plan:    1) HD MWF schedule  2) no RKAAN   3) BP : avoid too tight BP control , goal 130-140s systolic   4) fluid restriction 40 oz/day  5) communicate with DR Baker regarding thrombocytopenia     Discussed with nursing    Please call with questions    Angel Magallanes MD FASN  Cell [phone]  Pager: [phone]  Fax   [phone]       Subjective:     - No acute over night events.  - respiratory - stable  - hemodynamics - stable, no pressrs  - UOP-poor  - Nutrition -FTT    Objective:     /74   Pulse 50   Temp 98 °F (36.7 °C) (Oral)   Resp 18   Ht 1.905 m (6' 3\")   Wt 78.8 kg (173 lb 12.8 oz)   SpO2 94%   BMI 21.72 kg/m²       Intake/Output Summary (Last 24 hours) at 6/18/2024 1413  Last data filed at 6/18/2024 1333  Gross per 24 hour   Intake 740 ml   Output 1700 ml   Net -960 ml         Physical Exam:     Gen NAD  HENT mmm  RS AEBE   CVS s1s2 wnl no JVD  Ext edema +  Skin no rashes  Neuro oriented X 3     Data Review:    No results for input(s): \"WBC\", \"RBC\", \"HCT\", \"MCV\", \"MCH\", \"MCHC\", \"RDW\", \"PLATELET\", \"MPV\" in the last 72 hours.    Invalid input(s): \"HEMOGLOBIN\"  No results for input(s): \"BUN\", \"K\", \"NA\", \"CL\", \"CO2\", \"PHOS\", \"GLU\" in the last 72 hours.    Invalid input(s): \"CREA\", \"CA\", \"ALB\"    Angel Magallanes MD

## 2024-06-19 PROBLEM — Z02.79 ENCOUNTER FOR EVALUATION OF ABILITY TO MAKE DECISIONS REGARDING CARE: Status: ACTIVE | Noted: 2024-06-19

## 2024-06-19 LAB
EKG ATRIAL RATE: 326 BPM
EKG DIAGNOSIS: NORMAL
EKG Q-T INTERVAL: 542 MS
EKG QRS DURATION: 100 MS
EKG QTC CALCULATION (BAZETT): 452 MS
EKG R AXIS: -31 DEGREES
EKG T AXIS: 176 DEGREES
EKG VENTRICULAR RATE: 42 BPM

## 2024-06-19 PROCEDURE — 99221 1ST HOSP IP/OBS SF/LOW 40: CPT | Performed by: PSYCHIATRY & NEUROLOGY

## 2024-06-19 PROCEDURE — 2500000003 HC RX 250 WO HCPCS: Performed by: PHYSICIAN ASSISTANT

## 2024-06-19 PROCEDURE — 6360000002 HC RX W HCPCS: Performed by: PHYSICIAN ASSISTANT

## 2024-06-19 PROCEDURE — 2140000001 HC CVICU INTERMEDIATE R&B

## 2024-06-19 PROCEDURE — 6370000000 HC RX 637 (ALT 250 FOR IP): Performed by: PHYSICIAN ASSISTANT

## 2024-06-19 PROCEDURE — 2580000003 HC RX 258: Performed by: PHYSICIAN ASSISTANT

## 2024-06-19 PROCEDURE — 90935 HEMODIALYSIS ONE EVALUATION: CPT

## 2024-06-19 PROCEDURE — 99232 SBSQ HOSP IP/OBS MODERATE 35: CPT | Performed by: INTERNAL MEDICINE

## 2024-06-19 PROCEDURE — 97530 THERAPEUTIC ACTIVITIES: CPT

## 2024-06-19 PROCEDURE — 97535 SELF CARE MNGMENT TRAINING: CPT

## 2024-06-19 PROCEDURE — 6370000000 HC RX 637 (ALT 250 FOR IP): Performed by: SURGERY

## 2024-06-19 PROCEDURE — 93010 ELECTROCARDIOGRAM REPORT: CPT | Performed by: INTERNAL MEDICINE

## 2024-06-19 PROCEDURE — 94761 N-INVAS EAR/PLS OXIMETRY MLT: CPT

## 2024-06-19 RX ADMIN — HEPARIN SODIUM 5000 UNITS: 5000 INJECTION INTRAVENOUS; SUBCUTANEOUS at 21:50

## 2024-06-19 RX ADMIN — CHOLECALCIFEROL TAB 25 MCG (1000 UNIT) 1000 UNITS: 25 TAB at 09:07

## 2024-06-19 RX ADMIN — TRAMADOL HYDROCHLORIDE 50 MG: 50 TABLET ORAL at 09:07

## 2024-06-19 RX ADMIN — APIXABAN 2.5 MG: 2.5 TABLET, FILM COATED ORAL at 21:48

## 2024-06-19 RX ADMIN — TRAMADOL HYDROCHLORIDE 50 MG: 50 TABLET ORAL at 21:50

## 2024-06-19 RX ADMIN — CYANOCOBALAMIN TAB 1000 MCG 1000 MCG: 1000 TAB at 09:07

## 2024-06-19 RX ADMIN — ACETAMINOPHEN 325MG 650 MG: 325 TABLET ORAL at 04:44

## 2024-06-19 RX ADMIN — SODIUM CHLORIDE, PRESERVATIVE FREE 10 ML: 5 INJECTION INTRAVENOUS at 21:52

## 2024-06-19 RX ADMIN — HEPARIN SODIUM 5000 UNITS: 5000 INJECTION INTRAVENOUS; SUBCUTANEOUS at 06:19

## 2024-06-19 RX ADMIN — Medication 500 MG: at 09:07

## 2024-06-19 RX ADMIN — CALCIUM ACETATE 667 MG: 667 CAPSULE ORAL at 09:07

## 2024-06-19 RX ADMIN — APIXABAN 2.5 MG: 2.5 TABLET, FILM COATED ORAL at 09:07

## 2024-06-19 RX ADMIN — SODIUM CHLORIDE, PRESERVATIVE FREE 10 ML: 5 INJECTION INTRAVENOUS at 09:07

## 2024-06-19 ASSESSMENT — PAIN SCALES - GENERAL
PAINLEVEL_OUTOF10: 0
PAINLEVEL_OUTOF10: 6
PAINLEVEL_OUTOF10: 0
PAINLEVEL_OUTOF10: 0
PAINLEVEL_OUTOF10: 3
PAINLEVEL_OUTOF10: 0

## 2024-06-19 ASSESSMENT — PAIN SCALES - WONG BAKER
WONGBAKER_NUMERICALRESPONSE: HURTS A LITTLE BIT
WONGBAKER_NUMERICALRESPONSE: HURTS A LITTLE BIT

## 2024-06-19 ASSESSMENT — PAIN DESCRIPTION - ONSET
ONSET: ON-GOING
ONSET: ON-GOING

## 2024-06-19 ASSESSMENT — PAIN DESCRIPTION - PAIN TYPE
TYPE: CHRONIC PAIN
TYPE: CHRONIC PAIN

## 2024-06-19 ASSESSMENT — PAIN DESCRIPTION - DESCRIPTORS
DESCRIPTORS: ACHING
DESCRIPTORS: ACHING

## 2024-06-19 ASSESSMENT — PAIN DESCRIPTION - LOCATION
LOCATION: FOOT

## 2024-06-19 ASSESSMENT — PAIN DESCRIPTION - ORIENTATION
ORIENTATION: LEFT

## 2024-06-19 ASSESSMENT — PAIN DESCRIPTION - FREQUENCY
FREQUENCY: CONTINUOUS
FREQUENCY: CONTINUOUS

## 2024-06-19 ASSESSMENT — PAIN - FUNCTIONAL ASSESSMENT
PAIN_FUNCTIONAL_ASSESSMENT: ACTIVITIES ARE NOT PREVENTED
PAIN_FUNCTIONAL_ASSESSMENT: PREVENTS OR INTERFERES SOME ACTIVE ACTIVITIES AND ADLS

## 2024-06-19 NOTE — PROGRESS NOTES
In Patient Progress note      Admit Date: 6/13/2024      Impression:       1) ESRD HD MWF schedule , access AVF which is thrombosed , has a TDC for hd     2) AVG thrombosed , needs  revision     3) Anemia of CKD    4) thrombocytopenia     5) sec hyperpara    6) AMS     Plan:    1) HD MWF schedule  2) no RAKAN   3) BP : avoid too tight BP control , goal 130-140s systolic   4) fluid restriction 40 oz/day    Discussed with nursing    Please call with questions    Angel Magallanes MD FASN  Cell [phone]  Pager: [phone]  Fax   [phone]       Subjective:     - intermittent confusion   - respiratory - stable  - hemodynamics - stable, no pressrs  - UOP-poor  - Nutrition -FTT    Objective:     /74   Pulse 53   Temp 97.2 °F (36.2 °C)   Resp 18   Ht 1.905 m (6' 3\")   Wt 78.8 kg (173 lb 12.8 oz)   SpO2 98%   BMI 21.72 kg/m²       Intake/Output Summary (Last 24 hours) at 6/19/2024 1627  Last data filed at 6/19/2024 1320  Gross per 24 hour   Intake 1000 ml   Output 100 ml   Net 900 ml         Physical Exam:     Gen NAD  HENT mmm  RS AEBE   CVS s1s2 wnl no JVD  Ext edema +  Skin no rashes  Neuro oriented X 3     Data Review:    Recent Labs     06/18/24  1442   WBC 8.4   RBC 3.70*   HCT 35.5*   MCV 95.9   MCH 30.8   MCHC 32.1   RDW 13.2   MPV 11.9*     Recent Labs     06/18/24  1442   BUN 34*   K 4.7   *   CL 98*   CO2 27       Angel Magallanes MD

## 2024-06-19 NOTE — PLAN OF CARE
Problem: Pain  Goal: Verbalizes/displays adequate comfort level or baseline comfort level  Outcome: Progressing     Problem: Discharge Planning  Goal: Discharge to home or other facility with appropriate resources  Outcome: Progressing     Problem: Chronic Conditions and Co-morbidities  Goal: Patient's chronic conditions and co-morbidity symptoms are monitored and maintained or improved  Outcome: Progressing     Problem: Safety - Adult  Goal: Free from fall injury  Outcome: Progressing     Problem: Occupational Therapy - Adult  Goal: By Discharge: Performs self-care activities at highest level of function for planned discharge setting.  See evaluation for individualized goals.  Description: Occupational Therapy Goals:  Initiated 6/17/2024 to be met within 7-10 days.    1.  Patient will perform grooming task standing with modified independence, F+ balance.   2.  Patient will perform lower body dressing with modified independence.  3.  Patient will perform toilet transfers with modified independence.  4.  Patient will perform all aspects of toileting with modified independence.      PLOF: Pt was independent with self-care and used a rollator for functional mobility.  6/19/2024 1353 by Marialuisa Meredith OTA  Outcome: Progressing

## 2024-06-19 NOTE — CONSULTS
Hospitalist Consult Note    Patient: Aniceto Interiano Sr MRN: [id number]  CSN: [id number]    YOB: 1945  Age: 78 y.o.  Sex: male    DOA: 6/13/2024 LOS:  LOS: 1 day        Requesting Physician:  Dr. Alejandro Agee  Reason for Consultation:  Medical Management, Bradycardia    Chief Complaint:  Occluded LUE Brachial-Axillary AV Graft    Assessment/Plan     Patient Active Problem List   Diagnosis    ESRD (end stage renal disease) (HCC)    Hypertension    History of DVT (deep vein thrombosis)    [place] (hard of hearing)    Chronic deep vein thrombosis (DVT) of proximal vein of right lower extremity (HCC)    Thrombocytopenia, unspecified (HCC)    Atrial fibrillation (HCC)    Typical atrial flutter    Preoperative clearance    Chronic anticoagulation    PAD (peripheral artery disease) ([facility])    Shortness of breath       A/P:  ?Symptomatic Bradycardia  Atrial Fibrillation w/ Slow Ventricular Response  - Cont telemetry; attempt to correlate symptom-rhythm correlation w/ next bradycardic episode  - Evidence of chronotropic competence, patient able to increase HR sustained into 60s w/ movement/getting out of bed  - Encephalopathy is sustained w/ fluctuating levels of consciousness not correlating to HR/BP - more consistent w/ delirium  - Avoid AV blocking agents   - Atropine 1mg IV Q5min PRN for HR <50 and Hypotension (MAP <65 or worsening AMS)  - Cardiology consult in AM to discuss slow Afib  - Cont. eliquis 2.5mg BID    Hospital Acquired Delirium  Hx of Dementia   Functional Disability ? Requiring SNF placement  - Cont. 1:1 Sitter  - Delerium precautions  - Qtc >550; avoid QT prolonging agents including antipsychotics  - PT/OT consulted    Chronic/Stable:  Thrombosis of LUE Brach-Axillary AV Graft s/p Attempted Thrombectomy  L Innominate Vein Stenosis   - Initial attempt at L AVG thrombectomy on 6/13 w/o success underwent PTA of L innominante vein   - AVG Revision on hold per Vascular; being considered for

## 2024-06-19 NOTE — PLAN OF CARE
Problem: Occupational Therapy - Adult  Goal: By Discharge: Performs self-care activities at highest level of function for planned discharge setting.  See evaluation for individualized goals.  Description: Occupational Therapy Goals:  Initiated 6/17/2024 to be met within 7-10 days.    1.  Patient will perform grooming task standing with modified independence, F+ balance.   2.  Patient will perform lower body dressing with modified independence.  3.  Patient will perform toilet transfers with modified independence.  4.  Patient will perform all aspects of toileting with modified independence.      PLOF: Pt was independent with self-care and used a rollator for functional mobility.  Outcome: Progressing   OCCUPATIONAL THERAPY TREATMENT    Patient: Aniceto Interiano Sr (78 y.o. male)  Date: 6/19/2024  Diagnosis: ESRD (end stage renal disease) ([facility]) [N18.6]  PAD (peripheral artery disease) ([facility]) [I73.9] ESRD (end stage renal disease) ([facility])  Procedure(s) (LRB):  TUNNELED DIALYSIS CATHETER PLACEMENT, REMOVAL OF CURRENT TEMPORARY DIALYSIS CATHETER (N/A) 5 Days Post-Op  Precautions: General Precautions, Fall Risk ([place]),  ,  ,  ,  ,  ,  ,      Chart, occupational therapy assessment, plan of care, and goals were reviewed.  ASSESSMENT:  Pt presented supine in bed upon entry, on 3.5L O2NC, sitter present, and agreeable to work with OT. He came to EOB SBA in prep for functional tasks. Reviewed importance of performing PLB technique to prevent SOB and for optimal oxygenation. STS transfer CGA with support and was found to be soiled from BM, pt unaware. He required MAX A for umang area hygiene while in stance 2/2 decreased dyn standing balance and for thoroughness. Once finished, pt performed SPT to reclining chair CGA simulating BSC transfer. Pt was positioned for comfort and left with all needs within reach. RN made aware.  Progression toward goals:  []          Improving appropriately and progressing toward goals  [x]

## 2024-06-19 NOTE — PROGRESS NOTES
[facility]  Progress Note    Patient: Aniceto Interiano Sr Age: 78 y.o. : 1945 MR#: [id number] SSN: [id number]  Date/Time: 2024     C/C: AMS       Subjective:   HPI : consult from vascular regarding  medica issues               Review of Systems:  positive responses in bold type   Constitutional: Negative for fever, chills, diaphoresis and unexpected weight change.   HENT: Negative for ear pain, congestion, sore throat, rhinorrhea, drooling, trouble swallowing, neck pain and tinnitus.   Eyes: Negative for photophobia, pain, redness and visual disturbance.   Respiratory: negative for shortness of breath, cough, choking, chest tightness, wheezing or stridor.   Cardiovascular: Negative for chest pain, palpitations and leg swelling.   Gastrointestinal: Negative for nausea, vomiting, abdominal pain, diarrhea, constipation, blood in stool, abdominal distention and anal bleeding.   Genitourinary: Negative for dysuria, urgency, frequency, hematuria, flank pain and difficulty urinating.   Musculoskeletal: Negative for back pain and arthralgias.   Skin: Negative for color change, rash and wound.   Neurological: Negative for dizziness, seizures, syncope, speech difficulty, light-headedness or headaches.   Hematological: Does not bruise/bleed easily.   Psychiatric/Behavioral: Negative for suicidal ideas, hallucinations, behavioral problems, self-injury or agitation     Assessment/Plan:     1. Persistent atrial fibrillation - continue holding Eliquis - till cleared by vascular   2 ESRD on HD   3 AMS- ? Encephalopathy Vs mental status change on baseline dementia     PLAN   - Labs reviewed   - HR controlled   - placement to SNIF if patient agrees and if he is not able to make decisions then patient family will make decision. Psych consulted - for capacity of patient to make decision   - Low stable h/H     Dispo Plan:     Objective:       General:  awake ?  orientation  HEENT: No

## 2024-06-19 NOTE — CONSULTS
[facility]    Psychiatric Consult Note      Reason for consult:  Capacity evaluation.    Requesting provider: Dr. Lai.    HISTORY OF PRESENT ILLNESS:      The patient is a 78 y.o. male with no past psychiatric history per record review, and past medical history of ESRD on HD, A-fib, HTN, hx of DVT, who was admitted medically for difficulties with his dialysis access. Psychiatry consulted for a capacity evaluation for potential SNF placement, per Dr. Lai, who states that if the patient does not have capacity, his daughter could make decisions for him. When I went to his assigned room for the assessment he was not there, and I was told by the nurse he had gone to dialysis, so I went to dialysis and interviewed him there. The patient was asleep when I first approached but he did wake up, though I noted he is hard of hearing. In the beginning he would struggle to stay alert and would appear somnolent, but after a few minutes he remained awake and looked at me with a fixed gaze. This fixed gaze was accompanied with confusion and a somewhat suspicious mindset, as after I introduced myself and explained in a clear manner the purpose of my assessment, he would continuously ask me why I was asking these questions. He did agree to answer basic orientation questions, and was only oriented to his name and date of birth. He answered \"[facility]\" for current place and could not identify the place as a hospital, and as for the name of the building he again said \"[facility].\" When asked the year, he said \"1991\" and did not wish to answer month or date. When asked who the current [place] President says he said \"Millan.\" He was unable to discuss or articulate the reason for his admission, or details of his current care. He does not answer any questions regarding safety, including suicidal questions, or questions about any history of depression or other psychiatric conditions. He has  (end stage renal disease) (HCC)     Hemodialysis patient (HCC)     [place] (hard of hearing)     Hx of blood clots     Hypertension        Past Surgical History:        Procedure Laterality Date    CARDIAC PROCEDURE N/A 6/13/2024    Intravascular ultrasound performed by Alejandro Agee MD at [facility] CARDIAC CATH LAB    DIALYSIS FISTULA CREATION Left 4/19/2024    LEFT UPPER EXTREMITY BRACHIAL-AXILLARY ARTERIOVENOUS GRAFT CREATION performed by Alejandro Agee MD at [facility] CARDIAC SURGERY    EYE SURGERY      INVASIVE VASCULAR N/A 1/30/2024    Ultrasound guided vascular access performed by Serjio Anaya MD at [facility] CARDIAC CATH LAB    INVASIVE VASCULAR N/A 4/25/2024    Tunnel dialysis catheter exchange performed by Ricky Martinez MD at [facility] CARDIAC CATH LAB    INVASIVE VASCULAR N/A 6/13/2024    Fistulogram left performed by Alejandro Agee MD at [facility] CARDIAC CATH LAB    INVASIVE VASCULAR N/A 6/13/2024    Angioplasty fistula/dialysis circuit performed by Alejandro Agee MD at [facility] CARDIAC CATH LAB    INVASIVE VASCULAR N/A 6/13/2024    Thrombectomy peripheral artery performed by Alejandro Agee MD at [facility] CARDIAC CATH LAB    INVASIVE VASCULAR N/A 6/13/2024    Tunnel dialysis catheter removal performed by Alejandro Agee MD at [facility] CARDIAC CATH LAB    INVASIVE VASCULAR N/A 6/13/2024    Tunnel dialysis catheter insertion performed by Alejandro Agee MD at [facility] CARDIAC CATH LAB    LEG SURGERY Left 12/15/2023    LEFT LEG WOUND DEBRIDEMENT WITH SKIN GRAFT APPLICATION WITH KERECIS performed by Serjio Anaya MD at [facility] CARDIAC SURGERY    PORT SURGERY N/A 4/19/2024    TUNNELED DIALYSIS CATHETER EXCHANGE; C-ARM performed by Alejandro Agee MD at [facility] CARDIAC SURGERY    PORT SURGERY N/A 6/14/2024    TUNNELED DIALYSIS CATHETER PLACEMENT, REMOVAL OF CURRENT TEMPORARY DIALYSIS CATHETER performed by Alejandro Agee MD at [facility] CARDIAC SURGERY

## 2024-06-19 NOTE — DIALYSIS
HD Care plan  Time: 3 hrs  Dialysate: 2 K+  2 Ca++  Bath ( serum ca++ level 10.4)  Net UF: 1 L as tolerated  Access: Aseptic care for RT Femoral TDC  Hemodynamic stability: Maintain BP WNL     Pre Dialysis:  Patient received from Daryc Gunn RN . Patient arrived on a bed, A+O x 4, on RA, no s/s of acute distress noted. Rt Femoral TDC assessed, no abnormalities noted. TDC accessed per protocol without any difficulty, line patent with good flow.     Intra Dialysis:  Time out / safety process performed per policy. Tx initiated at 1425.    TDC flowing with ease. For hemodynamic stability UF goal set at 1000 ml as tolerated.  Pt offered assistance with repositioning every 2 hours/prn    Vascular access visible and line connections remained intact throughout entire duration of treatment.   Vital signs checked every 15 mins. WNL     Post Dialysis:  Tx completed at 1726,   Tolerated well, 1 L  removed. De-accessed per protocol.    Dialysis catheter locked accordingly with Heparin 2.3 ml in arterial port, and 2.3 ml in venous port. Catheter dressing clean, dry and intact.  Post Dialysis report given to ORI Taveras

## 2024-06-19 NOTE — PROGRESS NOTES
[facility]      Bradycardic overnight. IM team consulted. PRN atropine initiated, sitter ordered, delirium precautions ordered.       PE:   Blood pressure (!) 151/89, pulse 51, temperature 97.6 °F (36.4 °C), temperature source Oral, resp. rate 17, height 1.905 m (6' 3\"), weight 78.8 kg (173 lb 12.8 oz), SpO2 97 %.      Intake/Output Summary (Last 24 hours) at 6/19/2024 0949  Last data filed at 6/19/2024 0935  Gross per 24 hour   Intake 360 ml   Output 100 ml   Net 260 ml       NAD  Breathing comfortably  Alert  Not oriented  LUE warm.   R thigh TDC in place       Lab Results   Component Value Date    WBC 8.4 06/18/2024    HGB 11.4 (L) 06/18/2024    HCT 35.5 (L) 06/18/2024    MCV 95.9 06/18/2024     (L) 06/18/2024     Lab Results   Component Value Date/Time     06/18/2024 02:42 PM    K 4.7 06/18/2024 02:42 PM    CL 98 06/18/2024 02:42 PM    CO2 27 06/18/2024 02:42 PM    BUN 34 06/18/2024 02:42 PM    CREATININE 7.85 06/18/2024 02:42 PM    GLUCOSE 103 06/18/2024 02:42 PM    GLUCOSE 103 07/14/2023 01:05 AM    CALCIUM 10.4 06/18/2024 02:42 PM    LABGLOM 6 06/18/2024 02:42 PM    LABGLOM 8 04/19/2024 06:20 AM            IMPRESSION:   ESRD currently dialyzing without issue with a R thigh TDC  Occluded LUE AVG. Cleared from cardiology for revision in the OR. Currently working on scheduling the operation.   Intermittent mental status changes attributed to metabolic encephalopathy and/or delirium.   Possible dementia  Asymptomatic bradycardia. Cardiology has been consulted.       PLAN:   I spoke with his daughter today. She is concerned about him coming home with home health due to previous noncompliance. She was at work and we weren't able to complete the conversation.   Will schedule LUE AVG revision in the near future. It will not happen on this admission.   I will discuss with the medicine team about possible dementia evaluation. This will be important moving forward when it comes to  obtaining consent for procedure and also regarding determining the best possible living situation for the patient. Very much appreciate medical team involvement.       Alejandro Agee MD  Vascular Surgeon  [facility]

## 2024-06-19 NOTE — PROGRESS NOTES
Bedside and Verbal shift change report given to Linda Oneil RN (oncoming nurse) by Viviana Nath RN (offgoing nurse). Report included the following information Nurse Handoff Report, Adult Overview, and Cardiac Rhythm A fib .       2054:  Dr. Acosta paged for HR down to 27 bpm.    2134:  Dr. Tejada consulted and EKG completed. Atropine order in and Dr. Tejada called for clarification on hypotensive parameters.      2200:  Atropine only to be given in setting of HR <50 and MAP < 65 or with symptoms.      0445:  Patient very restless this morning. Tylenol given for foot pain. Patient reassured and remains restless and crying out for help. Patient given ice chips and restlessness decreased.

## 2024-06-20 PROBLEM — F03.B0 MODERATE DEMENTIA (HCC): Status: ACTIVE | Noted: 2024-06-20

## 2024-06-20 PROBLEM — Z51.5 ENCOUNTER FOR PALLIATIVE CARE: Status: ACTIVE | Noted: 2024-06-20

## 2024-06-20 PROBLEM — R53.81 DEBILITY: Status: ACTIVE | Noted: 2024-06-20

## 2024-06-20 PROBLEM — N18.6 ESRD (END STAGE RENAL DISEASE) ON DIALYSIS (HCC): Status: ACTIVE | Noted: 2024-06-20

## 2024-06-20 PROBLEM — Z99.2 ESRD (END STAGE RENAL DISEASE) ON DIALYSIS (HCC): Status: ACTIVE | Noted: 2024-06-20

## 2024-06-20 PROBLEM — Z71.89 GOALS OF CARE, COUNSELING/DISCUSSION: Status: ACTIVE | Noted: 2024-06-20

## 2024-06-20 PROCEDURE — 2140000001 HC CVICU INTERMEDIATE R&B

## 2024-06-20 PROCEDURE — 6370000000 HC RX 637 (ALT 250 FOR IP): Performed by: SURGERY

## 2024-06-20 PROCEDURE — 2500000003 HC RX 250 WO HCPCS: Performed by: PHYSICIAN ASSISTANT

## 2024-06-20 PROCEDURE — 2580000003 HC RX 258: Performed by: PHYSICIAN ASSISTANT

## 2024-06-20 PROCEDURE — 99222 1ST HOSP IP/OBS MODERATE 55: CPT

## 2024-06-20 PROCEDURE — 6360000002 HC RX W HCPCS: Performed by: PHYSICIAN ASSISTANT

## 2024-06-20 PROCEDURE — 97530 THERAPEUTIC ACTIVITIES: CPT

## 2024-06-20 PROCEDURE — 6370000000 HC RX 637 (ALT 250 FOR IP): Performed by: PHYSICIAN ASSISTANT

## 2024-06-20 PROCEDURE — 94761 N-INVAS EAR/PLS OXIMETRY MLT: CPT

## 2024-06-20 PROCEDURE — 99232 SBSQ HOSP IP/OBS MODERATE 35: CPT | Performed by: INTERNAL MEDICINE

## 2024-06-20 RX ADMIN — CALCIUM ACETATE 667 MG: 667 CAPSULE ORAL at 11:46

## 2024-06-20 RX ADMIN — APIXABAN 2.5 MG: 2.5 TABLET, FILM COATED ORAL at 21:00

## 2024-06-20 RX ADMIN — TRAMADOL HYDROCHLORIDE 50 MG: 50 TABLET ORAL at 08:19

## 2024-06-20 RX ADMIN — CALCIUM ACETATE 667 MG: 667 CAPSULE ORAL at 17:31

## 2024-06-20 RX ADMIN — CHOLECALCIFEROL TAB 25 MCG (1000 UNIT) 1000 UNITS: 25 TAB at 08:19

## 2024-06-20 RX ADMIN — HEPARIN SODIUM 5000 UNITS: 5000 INJECTION INTRAVENOUS; SUBCUTANEOUS at 15:02

## 2024-06-20 RX ADMIN — HEPARIN SODIUM 5000 UNITS: 5000 INJECTION INTRAVENOUS; SUBCUTANEOUS at 22:00

## 2024-06-20 RX ADMIN — Medication 500 MG: at 08:19

## 2024-06-20 RX ADMIN — CYANOCOBALAMIN TAB 1000 MCG 1000 MCG: 1000 TAB at 08:19

## 2024-06-20 RX ADMIN — HEPARIN SODIUM 5000 UNITS: 5000 INJECTION INTRAVENOUS; SUBCUTANEOUS at 06:41

## 2024-06-20 RX ADMIN — SODIUM CHLORIDE, PRESERVATIVE FREE 10 ML: 5 INJECTION INTRAVENOUS at 21:01

## 2024-06-20 RX ADMIN — APIXABAN 2.5 MG: 2.5 TABLET, FILM COATED ORAL at 08:19

## 2024-06-20 RX ADMIN — AMLODIPINE BESYLATE 10 MG: 10 TABLET ORAL at 08:19

## 2024-06-20 RX ADMIN — CALCIUM ACETATE 667 MG: 667 CAPSULE ORAL at 08:18

## 2024-06-20 RX ADMIN — TRAMADOL HYDROCHLORIDE 50 MG: 50 TABLET ORAL at 21:00

## 2024-06-20 ASSESSMENT — PAIN SCALES - WONG BAKER
WONGBAKER_NUMERICALRESPONSE: HURTS A LITTLE BIT
WONGBAKER_NUMERICALRESPONSE: NO HURT
WONGBAKER_NUMERICALRESPONSE: HURTS A LITTLE BIT
WONGBAKER_NUMERICALRESPONSE: HURTS A LITTLE BIT

## 2024-06-20 ASSESSMENT — PAIN SCALES - GENERAL
PAINLEVEL_OUTOF10: 0
PAINLEVEL_OUTOF10: 2
PAINLEVEL_OUTOF10: 0
PAINLEVEL_OUTOF10: 0
PAINLEVEL_OUTOF10: 5
PAINLEVEL_OUTOF10: 0

## 2024-06-20 NOTE — ACP (ADVANCE CARE PLANNING)
Advance Care Planning     Palliative Team Advance Care Planning (ACP) Conversation    Date of Conversation: 06/20/24     ACP documents on file prior to discussion:  -None    Healthcare Decision Maker:    Primary Decision Maker: KISHA INTERIANO - Child - [phone]     Conversation Summary:    Visited patient for new consult along with Palliative team member ANIKET Malin NP. Patient is sitting up in bed finishing his breakfast. Consumed 100% of his meal. Patient is talkative and pleasantly confused. States,\"I don't know why I keep coughing, I must have a cold\".   He pointed towards the door and asked our team if we were \"part of this group\", then stated, \"good doctors are dead\".     Patient is unable to participate in his health care conversations or make complex medica decisions. Appreciate input from psychiatry.  Monitor alarming with heart rate 42-55. Patient remained alert and continued to talk with our team.    He does not have an Advanced Medical Directive on file. He is not able to complete one. Unable to determine any additional family members other rj Jones listed on contacts.    Call placed to patient's daughter Kisha at [phone]. Received voice mail and able to leave a message to please return our call.     Palliative team remains available to provide support to Mr Interiano and his family during this hospital stay.    Resuscitation Status:   Code Status: Full Code     Documentation Completed:  -No new documents completed.    Fatimah Leonardo RN  Palliative Medicine Inpatient RN  Palliative COPE Line: [phone]

## 2024-06-20 NOTE — CARE COORDINATION
Discharge plan continues to be Home with [facility] at time of discharge, and patient to return home with his daughter.   Patient's daughter and granddaughter transport patient to and from Dialysis.   Patient's daughter to transport patient home when medically stable for discharge.       PT and OT are recommending home health for patient for discharge plan.       Patient refused Medicaid screening, patient said he is Over resourced.           Lucretia Leung RN  Case Management [phone]

## 2024-06-20 NOTE — PROGRESS NOTES
Bedside and Verbal shift change report given  by RAMIRO Jones RN (offgoing nurse). Report included the following information Nurse Handoff Report, Index, Intake/Output, Recent Results, and Cardiac Rhythm Afib .

## 2024-06-20 NOTE — PROGRESS NOTES
[facility]      No events overnight.  Evaluated by psych and deemed to not have capacity to make his own decisions.       PE:   Blood pressure 120/61, pulse (!) 36, temperature 97.3 °F (36.3 °C), temperature source Temporal, resp. rate 10, height 1.905 m (6' 3\"), weight 78.8 kg (173 lb 12.8 oz), SpO2 93 %.      Intake/Output Summary (Last 24 hours) at 6/20/2024 0956  Last data filed at 6/19/2024 1858  Gross per 24 hour   Intake 1180 ml   Output 1500 ml   Net -320 ml       NAD  Breathing comfortably  Alert  Tearful  He didn't express what was making him feel sad  BLE warm  BUE warm. No thrill over the graft      Lab Results   Component Value Date    WBC 8.4 06/18/2024    HGB 11.4 (L) 06/18/2024    HCT 35.5 (L) 06/18/2024    MCV 95.9 06/18/2024     (L) 06/18/2024     Lab Results   Component Value Date/Time     06/18/2024 02:42 PM    K 4.7 06/18/2024 02:42 PM    CL 98 06/18/2024 02:42 PM    CO2 27 06/18/2024 02:42 PM    BUN 34 06/18/2024 02:42 PM    CREATININE 7.85 06/18/2024 02:42 PM    GLUCOSE 103 06/18/2024 02:42 PM    GLUCOSE 103 07/14/2023 01:05 AM    CALCIUM 10.4 06/18/2024 02:42 PM    LABGLOM 6 06/18/2024 02:42 PM    LABGLOM 8 04/19/2024 06:20 AM            IMPRESSION:   ESRD undergoing HD via R groin TDC  Occluded LUE AVG s/p treatment of L innominate vein stenosis  Recently deemed to not have capacity to make decisions by psychiatry. Question was raised about possible dementia  Afib w/ bradycardia 2 days ago        PLAN:   Will reach back out to cardiology regarding bradycardic episodes  Very much appreciate psychiatry eval and medical team involvement.   I reached out to his daughter today to discuss disposition plans.  Specifically, if a nursing facility still remains a possible option. She had previously expressed that this might be a good option for the pt.  Will reach out to neurology about a possible inpatient demential eval and/or to schedule short term follow up

## 2024-06-20 NOTE — PROGRESS NOTES
[facility] Hospitalist Group  Progress Note    Patient: Aniceto Interiano Sr Age: 78 y.o. : 1945 MR#: [id number] SSN: [id number]  Date/Time: 2024     C/C: AMS       Subjective:   HPI : consult from vascular regarding  medica issues               Review of Systems:  Today patient is sitting in bed  Patient is alert awake orientation x 1  No behavioral issue    Answer to some simple questions like no shortness of breath no chest pain no nausea vomiting      Assessment/Plan:     1. Persistent atrial fibrillation - continue holding Eliquis - till cleared by vascular   2 ESRD on HD   3 AMS- ? Encephalopathy Vs mental status change on baseline dementia   4 secondary hyperparathyroid   5 thrombosed arteriovenous graft-needs revision defer to vascular surgery  6 seen by psych-patient has no capacity to make decisions.      PLAN   2024  -Patient is alert awake oriented x1  -Continue hemodialysis per renal  -Discussed with case management-patient will need O2 evaluation, patient can be placed home under care of his daughter      2024  - Labs reviewed   - HR controlled   - placement to SNIF if patient agrees and if he is not able to make decisions then patient family will make decision. Psych consulted - for capacity of patient to make decision   - Low stable h/H     Dispo Plan: Per  today Home with home health    Objective:       General: Awake but orientation x 1  HEENT: No facial asymmetry, THELMA Armando, External ears - WNL    Cardiovascular: S1S2 - regular , No Murmur   Pulmonary: Equal expansion , No Use of accessory muscles , No Rales No Rhonchi    GI:  +BS in all four quadrants, soft, non-tender  Extremities:  No edema; 2+ dorsalis pedis pulses bilaterally  Neuro: no focal deficit       DVT Prophylaxis:  []Lovenox  []Hep SQ  []SCDs  []Coumadin   []On Heparin gtt    [] Eliquis [] Xarelto     Vitals:         VS: BP (!) 105/58   Pulse (!) 42   Temp (!) 96.1 °F (35.6 °C)  Problem: Pressure Injury - Risk of  Goal: *Prevention of pressure injury  Description: Document Alvin Scale and appropriate interventions in the flowsheet. Outcome: Progressing Towards Goal  Note: Pressure Injury Interventions:  Sensory Interventions: Assess changes in LOC    Moisture Interventions: Absorbent underpads    Activity Interventions: Pressure redistribution bed/mattress(bed type)    Mobility Interventions: HOB 30 degrees or less    Nutrition Interventions: Document food/fluid/supplement intake    Friction and Shear Interventions: Lift team/patient mobility team                Problem: Falls - Risk of  Goal: *Absence of Falls  Description: Document Lanette Fall Risk and appropriate interventions in the flowsheet. Outcome: Progressing Towards Goal  Note: Fall Risk Interventions:       Mentation Interventions: Adequate sleep, hydration, pain control    Medication Interventions: Evaluate medications/consider consulting pharmacy    Elimination Interventions:  Toileting schedule/hourly rounds              Problem: Nutrition Deficit  Goal: *Optimize nutritional status  Outcome: Progressing Towards Goal

## 2024-06-20 NOTE — CARE COORDINATION
CM spoke with patient's daughter Karen Interiano [phone], updated that pateint does not have capacity per Psych note.   Patient's daughter is agreeable to [employer] to call her for Medicaid Screening for patient for possible Personal Care Aide Services, and LTC needs for future if needed.   Patient's daughter is still agreeable for patient to return home with [facility] at this time.   Patient's daughter said she will be transporting patient home at time of discharge.           CM emailed via [employer] Chelle Garcia, Chelle Wagoner, and Chelle Jean with [employer] asked for Medicaid Screening for patient, and to please reach out to patient's daughter Karen Interiano, phone number given.             Lucretia Leung, RN  Case Management [phone]

## 2024-06-20 NOTE — CONSULTS
Palliative Medicine Initial Consult  Patient Name: Aniceto Interiano Sr  YOB: 1945  MRN: [id number]  Age: 78 y.o.  Gender: male    Date of Initial Consult: 6/19/2024  Date of Service: 6/20/2024  Time: 1:33 PM  Provider: REYNOLD Garcia  Hospital Day: 8  Admit Date: 6/13/2024  Referring Provider: Dr. Lai       Reasons for Consultation:  Goals of Care    HISTORY OF PRESENT ILLNESS (HPI):   Aniceto Interiano Sr is a 78 y.o. male with a past medical history of ESRD on dialysis, dementia with cognitive impairment, DVT, atrial fibrillation, and HTN, who was admitted on 6/13/2024 from home for a dialysis graft revision. Per chart review, there was an attempted revision of the recently placed LUE AV graft by vascular surgery.  And then an intermittent TDC was placed and used for hemodialysis in the interim.  The hospitalist team was consulted on June 18 due to increasing complications with the patient's comorbidities during this hospitalization, particularly symptomatic bradycardia and worsening encephalopathy.  On June 18 he had a rapid response team called for altered mental status, and at that time when he was evaluated he did not have insight into his hospital stay per chart review.  On June 19 Psychiatry saw the patient to determine capacity.  At that time it was determined the patient does not have capacity to make his own medical decisions.  Chart review indicates that at baseline he has dementia.  Palliative care was consulted for goals of care.    Psychosocial: Lives with daughter Karen in [place].  Per notes the patient also has 4 other children and grand daughter. Chart review indicates there is APS involvement.     6/20/2024 Patient seen in cardiac stepdown unit.  He had just finished breakfast.  He complained of \"having a cold.\"  He was alert to self but confused to place and situation.  He was pleasant and calm.    PALLIATIVE DIAGNOSES:    Goals of care  Encounter for palliative

## 2024-06-20 NOTE — PLAN OF CARE
Problem: Pain  Goal: Verbalizes/displays adequate comfort level or baseline comfort level  Outcome: Progressing  Flowsheets  Taken 6/20/2024 1200 by Evelyne Brewer RN  Verbalizes/displays adequate comfort level or baseline comfort level: Encourage patient to monitor pain and request assistance  Taken 6/20/2024 0820 by Evelyne Brewer RN  Verbalizes/displays adequate comfort level or baseline comfort level: Encourage patient to monitor pain and request assistance  Taken 6/20/2024 0300 by Burt Lanier RN  Verbalizes/displays adequate comfort level or baseline comfort level:   Encourage patient to monitor pain and request assistance   Assess pain using appropriate pain scale   Administer analgesics based on type and severity of pain and evaluate response     Problem: Discharge Planning  Goal: Discharge to home or other facility with appropriate resources  Outcome: Progressing  Flowsheets (Taken 6/20/2024 0820)  Discharge to home or other facility with appropriate resources: Identify barriers to discharge with patient and caregiver     Problem: Safety - Adult  Goal: Free from fall injury  Outcome: Progressing     Problem: Chronic Conditions and Co-morbidities  Goal: Patient's chronic conditions and co-morbidity symptoms are monitored and maintained or improved  Outcome: Progressing  Flowsheets (Taken 6/20/2024 0820)  Care Plan - Patient's Chronic Conditions and Co-Morbidity Symptoms are Monitored and Maintained or Improved: Monitor and assess patient's chronic conditions and comorbid symptoms for stability, deterioration, or improvement

## 2024-06-20 NOTE — PROGRESS NOTES
In Patient Progress note      Admit Date: 6/13/2024      Impression:       1) ESRD HD MWF schedule , access AVF which is thrombosed , has a TDC for hd in place    2) AVG thrombosed , needs revision     3) Anemia of CKD    4) thrombocytopenia     5) sec hyperpara    6) AMS     7) bradycardia     Plan:    1) HD MWF schedule  2) no RAKAN   3) BP : avoid too tight BP control , goal 130-140s systolic   4) fluid restriction 40 oz/day  5) cards consulted for bradycardia     Discussed with nursing    Please call with questions.    Angel Magallanes MD FASN  Cell [phone]  Pager: [phone]  Fax   [phone]       Subjective:     - intermittent confusion   - respiratory - stable  - hemodynamics - bradycardia , asymptomatic , no pressrs  - UOP-poor  - Nutrition -FTT    Objective:     BP (!) 105/58   Pulse (!) 42   Temp (!) 96.1 °F (35.6 °C) (Oral) Comment: nurse notified  Resp 13   Ht 1.905 m (6' 3\")   Wt 78.8 kg (173 lb 12.8 oz)   SpO2 100%   BMI 21.72 kg/m²       Intake/Output Summary (Last 24 hours) at 6/20/2024 1228  Last data filed at 6/20/2024 0820  Gross per 24 hour   Intake 1100 ml   Output 1500 ml   Net -400 ml         Physical Exam:     Gen NAD  HENT mmm  RS AEBE   CVS s1s2 wnl no JVD  Ext edema +  Skin no rashes  Neuro oriented X 3     Data Review:    Recent Labs     06/18/24  1442   WBC 8.4   RBC 3.70*   HCT 35.5*   MCV 95.9   MCH 30.8   MCHC 32.1   RDW 13.2   MPV 11.9*       Recent Labs     06/18/24  1442   BUN 34*   K 4.7   *   CL 98*   CO2 27         Angel Magallanes MD

## 2024-06-20 NOTE — PROGRESS NOTES
Physician Progress Note      PATIENT:               LUISA MENON SR  CSN #:                  [id number]  :                       1945  ADMIT DATE:       2024 6:32 AM  DISCH DATE:  RESPONDING  PROVIDER #:        Alejandro Oviedo MD          QUERY TEXT:    Patient admitted with ESRD, noted to have atrial fibrillation and is   maintained on Eliquis. If possible, please document in progress notes and   discharge summary if you are evaluating and/or treating any of the following:?  ?  The medical record reflects the following:  Risk Factors: a fib    Clinical Indicators:  card   Afib, controlled/slow ventricular response, on low-dose Eliquis for   anticoagulation as outpatient, not on AV анна blocking agents      Treatment:  apixaban (ELIQUIS) tablet 2.5 mg    Thank you,  Lisseth Villela RN [facility]  CRCR  Clinical Documentation  [phone] or via [employer]  [email or web address]  Options provided:  -- Secondary hypercoagulable state in a patient with atrial fibrillation  -- Other - I will add my own diagnosis  -- Disagree - Not applicable / Not valid  -- Disagree - Clinically unable to determine / Unknown  -- Refer to Clinical Documentation Reviewer    PROVIDER RESPONSE TEXT:    This patient has secondary hypercoagulable state in a patient with atrial   fibrillation.    Query created by: Lisseth Villela on 2024 3:05 PM      Electronically signed by:  Alejandro Oviedo MD 2024   3:22 PM

## 2024-06-20 NOTE — DISCHARGE INSTRUCTIONS
[facility]      Procedures Performed:   Access of the left arm AV graft  Balloon angioplasty of narrowing in the left innominate vein  Placement of a right groin temporary dialysis catheter    Surgeon: Dr. Alejandro Agee MD      Discharge Instructions:  Catheter care per your dialysis catheter  Do not apply cream or ointment to the access sites on the left arm Keep a dry gauze over the site(s) if there is mild drainage.   No lifting > 10 lbs for 2 weeks  Take tylenol as needed for pain.   If the access site(s) become red or have worsening drainage, call the vascular clinic or seek emergency care immediately.   If rapid swelling or bleeding occurs at the access site(s), apply firm and direct pressure and seek emergency care immediately.   Follow up in vascular surgery clinic with Dr. Agee as scheduled.   Call our clinic and/or seek emergency care if you develop a fever, chills, shortness of breath, rapid swelling, severe worsening pain, wound drainage, wound redness, or any other concerns.   You will be scheduled for left arm graft revision as soon as possible, within the next 1-4 weeks. Our office will call you to schedule.     Vascular Surgery Clinic Phone Number: [phone]

## 2024-06-20 NOTE — CARE COORDINATION
Dr Lai requesting Walk Test for patient, for possible Home Oxygen need.         YUVAL spoke with Evelyne REHMAN, and updated that patient needs a Walk Test  for possible Home Oxygen need.             Lucretia Leung RN  Case Management [phone]

## 2024-06-20 NOTE — PLAN OF CARE
Problem: Physical Therapy - Adult  Goal: By Discharge: Performs mobility at highest level of function for planned discharge setting.  See evaluation for individualized goals.  Description: Physical Therapy Goals:  Initiated 6/17/2024 to be met within 7-10 days.  1.  Patient will move from supine to sit and sit to supine , scoot up and down, and roll side to side in bed with independence in order to safely get into/out of bed.    2.  Patient will transfer from bed to chair and chair to bed with supervision/set-up using RW in order to safely partake in OOB mobility.  3.  Patient will perform sit to stand with supervision/set-up in order to safely partake in OOB mobility.  4.  Patient will ambulate with supervision/set-up for 50 feet using RW in order to safely navigate household distances.     PLOF: lives with daughter in 1 level apartment, independent at baseline using rollator    6/20/2024 1121 by Cynthia Campo, HERIBERTO  Outcome: Progressing    PHYSICAL THERAPY TREATMENT    Patient: Aniceto Interiano  (78 y.o. male)  Date: 6/20/2024  Diagnosis: ESRD (end stage renal disease) ([facility]) [N18.6]  PAD (peripheral artery disease) ([facility]) [I73.9] ESRD (end stage renal disease) ([facility])  Procedure(s) (LRB):  TUNNELED DIALYSIS CATHETER PLACEMENT, REMOVAL OF CURRENT TEMPORARY DIALYSIS CATHETER (N/A) 6 Days Post-Op  Precautions: General Precautions, Fall Risk ([place]),    ASSESSMENT:  Patient received in bed and agreed to PT. However, after discussing sitting in recliner or EOB pt refused. Pt educated on importance of mobility to improve strength, endurance, and for discharging. Attempted AROM of BLE. Pt continued to refuse therapy. Pt left in bed at end of session, RN notified of pt reluctance to participate in PT. Educated on need for RN assistance with mobility; verbalized understanding. Call bell in reach.     Progression toward goals:   []      Improving appropriately and progressing toward goals  [x]      Improving slowly and progressing  SPT  Minutes: 18

## 2024-06-21 ENCOUNTER — PREP FOR PROCEDURE (OUTPATIENT)
Age: 79
End: 2024-06-21

## 2024-06-21 ENCOUNTER — HOME HEALTH ADMISSION (OUTPATIENT)
Age: 79
End: 2024-06-21
Payer: MEDICARE

## 2024-06-21 VITALS
SYSTOLIC BLOOD PRESSURE: 130 MMHG | DIASTOLIC BLOOD PRESSURE: 71 MMHG | HEART RATE: 56 BPM | RESPIRATION RATE: 16 BRPM | WEIGHT: 174 LBS | TEMPERATURE: 98.9 F | HEIGHT: 75 IN | OXYGEN SATURATION: 97 % | BODY MASS INDEX: 21.64 KG/M2

## 2024-06-21 DIAGNOSIS — N18.6 END STAGE RENAL DISEASE (HCC): ICD-10-CM

## 2024-06-21 PROCEDURE — 94761 N-INVAS EAR/PLS OXIMETRY MLT: CPT

## 2024-06-21 PROCEDURE — 6370000000 HC RX 637 (ALT 250 FOR IP): Performed by: PHYSICIAN ASSISTANT

## 2024-06-21 PROCEDURE — 99232 SBSQ HOSP IP/OBS MODERATE 35: CPT | Performed by: INTERNAL MEDICINE

## 2024-06-21 PROCEDURE — 2500000003 HC RX 250 WO HCPCS: Performed by: PHYSICIAN ASSISTANT

## 2024-06-21 PROCEDURE — 6370000000 HC RX 637 (ALT 250 FOR IP): Performed by: SURGERY

## 2024-06-21 PROCEDURE — 6360000002 HC RX W HCPCS: Performed by: PHYSICIAN ASSISTANT

## 2024-06-21 PROCEDURE — 90935 HEMODIALYSIS ONE EVALUATION: CPT

## 2024-06-21 RX ADMIN — TRAMADOL HYDROCHLORIDE 50 MG: 50 TABLET ORAL at 12:17

## 2024-06-21 RX ADMIN — CHOLECALCIFEROL TAB 25 MCG (1000 UNIT) 1000 UNITS: 25 TAB at 12:17

## 2024-06-21 RX ADMIN — CALCIUM ACETATE 667 MG: 667 CAPSULE ORAL at 17:06

## 2024-06-21 RX ADMIN — CALCIUM ACETATE 667 MG: 667 CAPSULE ORAL at 12:18

## 2024-06-21 RX ADMIN — AMLODIPINE BESYLATE 10 MG: 10 TABLET ORAL at 12:18

## 2024-06-21 RX ADMIN — HEPARIN SODIUM 5000 UNITS: 5000 INJECTION INTRAVENOUS; SUBCUTANEOUS at 05:54

## 2024-06-21 RX ADMIN — Medication 500 MG: at 12:17

## 2024-06-21 RX ADMIN — CYANOCOBALAMIN TAB 1000 MCG 1000 MCG: 1000 TAB at 12:18

## 2024-06-21 RX ADMIN — APIXABAN 2.5 MG: 2.5 TABLET, FILM COATED ORAL at 12:17

## 2024-06-21 ASSESSMENT — PAIN SCALES - GENERAL
PAINLEVEL_OUTOF10: 0
PAINLEVEL_OUTOF10: 7
PAINLEVEL_OUTOF10: 0

## 2024-06-21 ASSESSMENT — PAIN SCALES - WONG BAKER: WONGBAKER_NUMERICALRESPONSE: NO HURT

## 2024-06-21 NOTE — PROGRESS NOTES
Bedside and Verbal shift change report given to RAMIRO Jones RN (oncoming nurse) by TERI Moreno (offgoing nurse). Report included the following information Nurse Handoff Report, Index, Intake/Output, Recent Results, and Cardiac Rhythm Afib .

## 2024-06-21 NOTE — DISCHARGE SUMMARY
Vascular Surgery Discharge Summary    77 y/o M with ESRD admitted to the vascular surgery service following LUE AVG access and balloon angioplasty of a L innominate vein severe stenosis for LUE AVG thrombosis. The procedure was complicated by the patient not tolerating IV sedation and loss of IV access. Graft thrombectomy was not completed. A temporary HD catheter was placed in the L groin. The plan was for open graft revision in the future.     He was admitted and evaluated by cardiology, per anesthesia request prior to undergoing open graft revision. The following day he underwent placement of a tunneled HD catheter and the temp catheter was removed. Stress test and TTE were recommended. These were normal and he was deemed low risk for cardiac events. His family requested possible SNF placement. This process took several days. It was noted by our team and several others that he seemed confused. He was evaluated by psychiatry and deemed to not have capacity to make decisions. Outpatient neurology eval for dementia was recommended.  Internal medicine was consulted and assisted with his multiple medical issues.     Ultimately the plan was for him to discharge home with home health. He was discharged home with plans to to schedule outpatient graft revision in the near future.       Alejandro Agee MD  [facility]  Vascular Surgeon        The time spent coordinating and preparing this patient discharge was approximately 40 minutes.

## 2024-06-21 NOTE — PLAN OF CARE
Problem: Pain  Goal: Verbalizes/displays adequate comfort level or baseline comfort level  6/21/2024 1505 by Evelyne Brewer RN  Outcome: Progressing  Flowsheets (Taken 6/21/2024 0944)  Verbalizes/displays adequate comfort level or baseline comfort level: Encourage patient to monitor pain and request assistance  6/21/2024 0213 by Miranda Moreno RN  Outcome: Progressing  Flowsheets (Taken 6/20/2024 1600 by Evelyne Brewer RN)  Verbalizes/displays adequate comfort level or baseline comfort level: Encourage patient to monitor pain and request assistance     Problem: Chronic Conditions and Co-morbidities  Goal: Patient's chronic conditions and co-morbidity symptoms are monitored and maintained or improved  Recent Flowsheet Documentation  Taken 6/21/2024 0944 by Evelyne Brewer RN  Care Plan - Patient's Chronic Conditions and Co-Morbidity Symptoms are Monitored and Maintained or Improved: Monitor and assess patient's chronic conditions and comorbid symptoms for stability, deterioration, or improvement  6/21/2024 0213 by Miranda Moreno RN  Outcome: Progressing  Flowsheets (Taken 6/20/2024 1925)  Care Plan - Patient's Chronic Conditions and Co-Morbidity Symptoms are Monitored and Maintained or Improved:   Collaborate with multidisciplinary team to address chronic and comorbid conditions and prevent exacerbation or deterioration   Monitor and assess patient's chronic conditions and comorbid symptoms for stability, deterioration, or improvement   Update acute care plan with appropriate goals if chronic or comorbid symptoms are exacerbated and prevent overall improvement and discharge     Problem: Discharge Planning  Goal: Discharge to home or other facility with appropriate resources  6/21/2024 1505 by Evelyne Brewer RN  Outcome: Completed  Flowsheets (Taken 6/21/2024 0944)  Discharge to home or other facility with appropriate resources: Identify barriers to discharge with patient and caregiver  6/21/2024

## 2024-06-21 NOTE — DIALYSIS
HD Care plan  Time: 3 hrs  Dialysate: 2 K+  2 Ca++  Bath ( serum ca++ level 10.4)  Net UF: 1 L as tolerated  Access: Aseptic care for RT Femoral TDC  Hemodynamic stability: Maintain BP WNL     Pre Dialysis:  Patient received from Evelyne Brewer RN . Patient arrived on a bed, A+O x 4, on RA, no s/s of acute distress noted. Rt Femoral TDC assessed, no abnormalities noted. TDC accessed per protocol without any difficulty, line patent with good flow.     Intra Dialysis:  Time out / safety process performed per policy. Tx initiated at 0842.    TDC flowing with ease. For hemodynamic stability UF goal set at 1000 ml as tolerated.  Pt offered assistance with repositioning every 2 hours/prn    Vascular access visible and line connections remained intact throughout entire duration of treatment.   Vital signs checked every 15 mins. WNL     Post Dialysis:  Tx completed at 1142,   Tolerated well, 1 L  removed. De-accessed per protocol.    Dialysis catheter locked accordingly with Heparin 2.3 ml in arterial port, and 2.3 ml in venous port. Catheter dressing clean, dry and intact.  Post Dialysis report given to ORI Stubbs

## 2024-06-21 NOTE — CARE COORDINATION
No Home Oxygen needed per Nursing note today.   RA at rest 93%.   RA while ambulating 93%.              Lucretia Leung, RN  Case Management [phone]

## 2024-06-21 NOTE — HOME CARE
Received home health referral for [facility] for SN,PT,OT. Discharge order noted for today; spoke to patient in room and also spoke to his daughter (Karen Interiano); Verified demographics,explained  services ,answered all questions and provided patient with [facility] contact card ;  Patient's daughter  states he has DME: [employer] and RW ; HH referral processed to [facility] central Intake and scheduling, [facility] dept informed that patient takes HD on Mon-Wed-Fri at 11am at [facility] in [facility].JOHN JIM.

## 2024-06-21 NOTE — ACP (ADVANCE CARE PLANNING)
Advance Care Planning     Palliative Team Advance Care Planning (ACP) Conversation    Date of Conversation: 06/21/24     ACP documents on file prior to discussion:  -None    Healthcare Decision Maker:    Primary Decision Maker: KISHA MENON - Child - [phone]     Conversation Summary:    Follow up visit made to patient along with Palliative team member JESSICA Leiva NP. Patient is resting quietly in bed, eyes closed. Did not disturb patient for visit.    Did not receive call back from daughter. Second call placed to patient's daughter at [phone]. Female answered the phone, states she is the granddaughter. Explained the reason for my call, granddaughter instructed me to call back later this afternoon.   Will reach back out to daughter as schedule allows.    Resuscitation Status:   Code Status: Full Code     Documentation Completed:  -No new documents completed.    Fatimah Leonardo RN  Palliative Medicine Inpatient RN  Palliative COPE Line: [phone]

## 2024-06-21 NOTE — PROGRESS NOTES
Insurance Required Oxygen Testing    Patient Name: Aniceto Interiano Sr                   YOB: 1945  Attending Physician: Alejandro Agee *    Assessment Completed by: Evelyne Brewer RN  Date / Time: 6/21/2024 2:14 PM    TEST #1 MUST ALWAYS BE DONE PRIOR TO DOING ANY FURTHER TESTING  USE WHOLE NUMBERS, “NOT RANGES” FOR OXYGEN TESTING DOCUMENTATION    TEST #1 - ROOM AIR AT REST:  _100_%; RECOVERY:  _N/A__ % AT _N/A__ LPM      NOTE:  IF PATIENT'S OXYGEN SATURATION IS 88% OR BELOW DURING ROOM AIR AT REST, NO FURTHER TESTING IS   NECESSARY, HOWEVER, YOU MUST RECORD THE RECOVERY SATURATION WITH THE RECOVERY LITER PER MINUTE  USED DURING THE RECOVERY. IF THE RESULTS ARE GREATER THAN 88%, THEN PROCEED TO TEST #2 AND TEST #3.     TEST #2 - ROOM AIR WHILE AMULATING:  __93__%    TEST #3 - AMBULATING ON __N/A__ LPM WITH OXYGEN SATS AT __N/A___%    NOTE:  IF PHYSICIAN IS ORDERING GREATER THAN 4 LPM, THE PATIENT'S SATURATION ON 4 LPM OR GREATER MUST BE DOCUMENTED.    OXYGEN SATURATION ON 4 LPM _____ %    OXYGEN SATURATION ON 5 LPM _____ %    OXYGEN SATURATION ON 6 LPM _____ %    Oxygen Via:      [] Nasal Cannula   [] Simple Face Mask   [] Non-Re-Breather Mask  [] Partial Re-Breather Mask   [] Venturi Mask     NOTE:  OXYGEN TESTING MUST BE DONE WITHIN TWO DAYS PRIOR TO DISCHARGE.

## 2024-06-21 NOTE — PROGRESS NOTES
[facility]      No events overnight.  Plans for pt to be discharged home.       PE:   Blood pressure 128/65, pulse (!) 42, temperature 97.3 °F (36.3 °C), temperature source Axillary, resp. rate 18, height 1.905 m (6' 3\"), weight 78.9 kg (174 lb), SpO2 98 %.      Intake/Output Summary (Last 24 hours) at 6/21/2024 0732  Last data filed at 6/21/2024 0621  Gross per 24 hour   Intake 490 ml   Output 100 ml   Net 390 ml       NAD  Breathing comfortably  Sitting in bed comfortably  BLE warm      Lab Results   Component Value Date    WBC 8.4 06/18/2024    HGB 11.4 (L) 06/18/2024    HCT 35.5 (L) 06/18/2024    MCV 95.9 06/18/2024     (L) 06/18/2024     Lab Results   Component Value Date/Time     06/18/2024 02:42 PM    K 4.7 06/18/2024 02:42 PM    CL 98 06/18/2024 02:42 PM    CO2 27 06/18/2024 02:42 PM    BUN 34 06/18/2024 02:42 PM    CREATININE 7.85 06/18/2024 02:42 PM    GLUCOSE 103 06/18/2024 02:42 PM    GLUCOSE 103 07/14/2023 01:05 AM    CALCIUM 10.4 06/18/2024 02:42 PM    LABGLOM 6 06/18/2024 02:42 PM    LABGLOM 8 04/19/2024 06:20 AM          IMPRESSION:   ESRD undergoing HD via R groin TDC  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 F/u cardiology final recs about episode of bradycardia.   Our office will arrange f/u for pt.       Alejandro Agee MD  Vascular Surgeon  [facility]

## 2024-06-21 NOTE — CARE COORDINATION
Discharge order noted for today. Pt has been accepted to [facility]. Spoke with patient's daughter and is agreeable to the transition plan today. Transport has been arranged through patient's daughter. Patient's home health  orders have been forwarded to [facility] via [email or web address]. Updated bedside RN, Evelyne,  to the transition plan.  Discharge information has been documented on the AVS.           Lucretia Leung RN  Case Management [phone]

## 2024-06-21 NOTE — CARE COORDINATION
CM called patient's daughter Karen Interiano [phone] back, CM received voicemail, CM left a message to get Verbal Consent via telephone for 2nd IMM letter to sign for patient.   CM left phone number for a return call.               Lucretia Leung RN  Case Management [phone]

## 2024-06-21 NOTE — CARE COORDINATION
Patient's daughter Karen Interiano [phone] called CM back, said she gets off work at 6 pm, and will be at the hospital after 6 pm to transport patient home today for discharge.             Lucretia Leung RN  Case Management [phone]

## 2024-06-21 NOTE — PROGRESS NOTES
In Patient Progress note      Admit Date: 6/13/2024      Impression:       1) ESRD HD MWF schedule , access AVF which is thrombosed , has a TDC for hd in place    2) AVG thrombosed , needs revision     3) Anemia of CKD    4) thrombocytopenia     5) sec hyperpara    6) AMS     7) bradycardia     Plan:    1) HD MWF schedule  2) no RAKAN   3) BP : avoid too tight BP control , goal 130-140s systolic   4) fluid restriction 40 oz/day  5) cards consulted for bradycardia     Following peripherally over weekend  Will see monday    Discussed with nursing    Please call with questions.    Angel Magallanes MD FASN  Cell [phone]  Pager: [phone]  Fax   [phone]       Subjective:     - intermittent confusion   - respiratory - stable  - hemodynamics - bradycardia , asymptomatic , no pressrs  - UOP-poor  - Nutrition -FTT    Objective:     /71   Pulse 56   Temp 98.9 °F (37.2 °C) (Oral)   Resp 16   Ht 1.905 m (6' 3\")   Wt 78.9 kg (174 lb)   SpO2 97%   BMI 21.75 kg/m²       Intake/Output Summary (Last 24 hours) at 6/21/2024 1610  Last data filed at 6/21/2024 1200  Gross per 24 hour   Intake 630 ml   Output 1850 ml   Net -1220 ml         Physical Exam:     Gen NAD  HENT mmm  RS AEBE   CVS s1s2 wnl no JVD  Ext edema +  Skin no rashes  Neuro oriented X 3     Data Review:    No results for input(s): \"WBC\", \"RBC\", \"HCT\", \"MCV\", \"MCH\", \"MCHC\", \"RDW\", \"PLATELET\", \"MPV\" in the last 72 hours.    Invalid input(s): \"HEMOGLOBIN\"    No results for input(s): \"BUN\", \"K\", \"NA\", \"CL\", \"CO2\", \"PHOS\", \"GLU\" in the last 72 hours.    Invalid input(s): \"CREA\", \"CA\", \"ALB\"      Angel Magallanes MD

## 2024-06-21 NOTE — CARE COORDINATION
CM called patient's daughter Kaern Interiano [phone], CM received voicemail, CM left message that patient has a discharge order for today, and that [facility] will be reaching out to her for scheduling.   CM asked for a return phone call to let CM know what time patient's daughter will be able to come and transport patient home for discharge today.   CM left phone number for a return call.             Lucretia Leung RN  Case Management [phone]

## 2024-06-21 NOTE — PROGRESS NOTES
[facility] - Progress Note    Admit Date: 6/13/2024  Attending Cardiologist: Dr. Neeraj Martinez    Assessment:     -Afib, controlled/slow ventricular response, on low-dose Eliquis for anticoagulation as outpatient, not on AV анна blocking agents  -ESRD, on HD  -HTN, on Amlodipine as outpatient  -Hx DVT     Primary cardiologist is Dr. Neeraj Martinez, recently established    Plan:       I saw, evaluated, interviewed and examined the patient personally.  Patient with slow A-fib however hemodynamically stable.  No dizziness.    Currently receiving dialysis.  No evidence of fluid overload.  Telemetry monitor with bradycardia but no significant sustained pause  Agree with avoiding any AV анна blocking agent.  Continue supportive care.  Will be available as needed.  Please call with    Neeraj Martinez MD       -Continue to avoid AV анна blocking agents.  -Continued on Amlodipine for HTN.  -Continue low-dose Eliquis.  -No new recommendations from cardiac standpoint.  Dispo planning per primary team.    Subjective:     No new complaints.     Objective:      Patient Vitals for the past 8 hrs:   Temp Pulse Resp BP SpO2   06/21/24 1045 -- (!) 43 -- 122/81 --   06/21/24 1030 -- (!) 483 -- 128/80 --   06/21/24 1015 -- (!) 34 -- 138/81 --   06/21/24 1000 -- 52 -- (!) 141/88 --   06/21/24 0945 -- (!) 40 -- 125/86 --   06/21/24 0930 -- (!) 38 -- 137/77 --   06/21/24 0915 -- (!) 48 -- 139/78 --   06/21/24 0900 -- (!) 41 -- 133/77 --   06/21/24 0845 -- (!) 43 -- (!) 145/95 --   06/21/24 0842 -- (!) 42 18 134/77 --   06/21/24 0835 (!) 96.6 °F (35.9 °C) (!) 41 18 (!) 140/81 --   06/21/24 0732 97.4 °F (36.3 °C) (!) 35 (!) 0 135/63 91 %   06/21/24 0341 97.3 °F (36.3 °C) (!) 42 18 128/65 98 %         Patient Vitals for the past 96 hrs:   Weight   06/21/24 0341 78.9 kg (174 lb)   06/17/24 1500 78.8 kg (173 lb 12.8 oz)   06/17/24 1351 73 kg (161 lb)       TELE: slow afib               Current Facility-Administered Medications

## 2024-06-21 NOTE — PROGRESS NOTES
Physician Progress Note      PATIENT:               LUISA MENON SR  CSN #:                  [id number]  :                       1945  ADMIT DATE:       2024 6:32 AM  DISCH DATE:  RESPONDING  PROVIDER #:        Alejandro Oviedo MD          QUERY TEXT:    Patient admitted with Occluded LUE AVG. Documentation reflects Hx of CHF.  If   possible, please document in the progress notes and discharge summary if CHF   was:    The medical record reflects the following:  Risk Factors:  a fib HTN    Clinical Indicators:  hospitalist consult   Hx of CHF      ECHO   Left?Ventricle: The EF by visual approximation is 60 - 65%. Left ventricle   size is normal. Increased wall thickness. Findings consistent with concentric   hypertrophy. Normal wall motion.    Treatment:  card consult    Thank you,  Lisseth Villela RN [facility]  Clinical Documentation  [phone] or via [employer]  [email or web address]  Options provided:  -- CHF confirmed after study  -- CHF ruled out after study  -- Other - I will add my own diagnosis  -- Disagree - Not applicable / Not valid  -- Disagree - Clinically unable to determine / Unknown  -- Refer to Clinical Documentation Reviewer    PROVIDER RESPONSE TEXT:    Provider is clinically unable to determine a response to this query.    Query created by: Lisseth Villela on 2024 12:21 PM      Electronically signed by:  Alejandro Oviedo MD 2024   1:29 PM

## 2024-06-21 NOTE — PROGRESS NOTES
Attempted pt for OT tx X 2. Pt unable to be seen 2/2 KEKE for HD. Will continue to follow up as schedule allows. Thank you  RYAN Evangelista/MARSHALL

## 2024-06-21 NOTE — PLAN OF CARE
Problem: Pain  Goal: Verbalizes/displays adequate comfort level or baseline comfort level  6/21/2024 0213 by Miranda Moreno RN  Outcome: Progressing  Flowsheets (Taken 6/20/2024 1600 by Evelyne Brewer RN)  Verbalizes/displays adequate comfort level or baseline comfort level: Encourage patient to monitor pain and request assistance  6/20/2024 1553 by Evelyne Brewer RN  Outcome: Progressing  Flowsheets  Taken 6/20/2024 1200 by Evelyne Brewer RN  Verbalizes/displays adequate comfort level or baseline comfort level: Encourage patient to monitor pain and request assistance  Taken 6/20/2024 0820 by Evelyne Brewer RN  Verbalizes/displays adequate comfort level or baseline comfort level: Encourage patient to monitor pain and request assistance  Taken 6/20/2024 0300 by Burt Lanier RN  Verbalizes/displays adequate comfort level or baseline comfort level:   Encourage patient to monitor pain and request assistance   Assess pain using appropriate pain scale   Administer analgesics based on type and severity of pain and evaluate response     Problem: Discharge Planning  Goal: Discharge to home or other facility with appropriate resources  6/21/2024 0213 by Miranda Moreno RN  Outcome: Progressing  Flowsheets (Taken 6/20/2024 1925)  Discharge to home or other facility with appropriate resources:   Identify barriers to discharge with patient and caregiver   Arrange for needed discharge resources and transportation as appropriate   Identify discharge learning needs (meds, wound care, etc)  6/20/2024 1553 by Evelyne Brewer RN  Outcome: Progressing  Flowsheets (Taken 6/20/2024 0820)  Discharge to home or other facility with appropriate resources: Identify barriers to discharge with patient and caregiver     Problem: Chronic Conditions and Co-morbidities  Goal: Patient's chronic conditions and co-morbidity symptoms are monitored and maintained or improved  6/21/2024 0213 by Miranda Moreno RN  Outcome:

## 2024-06-21 NOTE — PROGRESS NOTES
[facility]  Progress Note    Patient: Aniceto Interiano Sr Age: 78 y.o. : 1945 MR#: [id number] SSN: [id number]  Date/Time: 2024     C/C: AMS       Subjective:   HPI : consult from vascular regarding  medica issues               Review of Systems:  Today patient is sitting in bed  Patient is alert awake orientation x 1  No behavioral issue    Answer to some simple questions like no shortness of breath no chest pain no nausea vomiting      Assessment/Plan:     1. Persistent atrial fibrillation - continue holding Eliquis - till cleared by vascular   2 ESRD on HD   3 AMS- ? Encephalopathy Vs mental status change on baseline dementia   4 secondary hyperparathyroid   5 thrombosed arteriovenous graft-needs revision defer to vascular surgery  6 seen by psych-patient has no capacity to make decisions.      PLAN   2024    - No  New changes   No Behavioral issues   - For Afib - rate controlled , avoid анна blockers due to Low HR   - Low dose eliquis   - ESRD - per renal   - HTN - Norvasc and monitor BP out patient   - D/w vascular   - OK to dc `    2024  -Patient is alert awake oriented x1  -Continue hemodialysis per renal  -Discussed with case management-patient will need O2 evaluation, patient can be placed home under care of his daughter      2024  - Labs reviewed   - HR controlled   - placement to SNIF if patient agrees and if he is not able to make decisions then patient family will make decision. Psych consulted - for capacity of patient to make decision   - Low stable h/H     Dispo Plan: Per  today Home with home health    Objective:       General: Awake but orientation x 1  HEENT: No facial asymmetry, THELMA Armando, External ears - WNL    Cardiovascular: S1S2 - regular , No Murmur   Pulmonary: Equal expansion , No Use of accessory muscles , No Rales No Rhonchi    GI:  +BS in all four quadrants, soft, non-tender  Extremities:  No edema; 2+

## 2024-06-21 NOTE — CARE COORDINATION
YUVAL called and spoke with Karel Aguilar with [facility], and updated that patient will be discharging today.               Lucretia Leung RN  Case Management [phone]

## 2024-06-22 PROBLEM — T82.898A: Status: ACTIVE | Noted: 2024-06-22

## 2024-06-23 ENCOUNTER — HOME CARE VISIT (OUTPATIENT)
Age: 79
End: 2024-06-23

## 2024-06-24 ENCOUNTER — TELEPHONE (OUTPATIENT)
Age: 79
End: 2024-06-24

## 2024-06-24 ENCOUNTER — CARE COORDINATION (OUTPATIENT)
Facility: CLINIC | Age: 79
End: 2024-06-24

## 2024-06-24 PROBLEM — N18.6 END STAGE RENAL DISEASE (HCC): Status: ACTIVE | Noted: 2024-06-21

## 2024-06-24 NOTE — TELEPHONE ENCOUNTER
Called and left message at 11:17am to let daughter know, Karen about surgery scheduled tomorrow, June 25th at 8am check in time at [facility]. Waiting for return call.

## 2024-06-24 NOTE — CARE COORDINATION
Care Transitions Note    Initial Call - Call within 2 business days of discharge: Yes    Ctn Attempted to reach patient for transitions of care follow up. Unable to reach patient/family. Left a voice message with office contact information. No Pt. Medical/health information left on message.     Outreach Attempts:   HIPAA compliant voicemail left for patient.     Patient: Aniceto Interiano Sr    Patient : 1945   MRN: [id number]    Reason for Admission: ESRD (end stage renal disease) (HCC)   Discharge Date: 24  RURS: Readmission Risk Score: 7.3    Last Discharge Facility       Date Complaint Diagnosis Description Type Department Provider    24  Shortness of breath ... Admission (Discharged) [id number] Alejandro Agee MD            Was this an external facility discharge? No    Follow Up Appointment:   Patient does not have a follow up appointment scheduled at time of call. PCP appt. Request sent.   Future Appointments         Provider Specialty Dept Phone    2024 TBMargie Bob RN Home Health Care     2024 TBStephani Cabezas, OTR/L Home Health Care     2024 TBLala Trevizo, DYLON [facility]     2024 12:30 PM Guillermina Dumont DO Internal Medicine [phone]    2024 2:45 PM Neeraj Martinez MD Cardiology [phone]            Plan for follow-up on next business day.      Radha Pancahl RN

## 2024-06-25 ENCOUNTER — HOME CARE VISIT (OUTPATIENT)
Age: 79
End: 2024-06-25
Payer: MEDICARE

## 2024-06-25 ENCOUNTER — TELEPHONE (OUTPATIENT)
Facility: CLINIC | Age: 79
End: 2024-06-25

## 2024-06-25 ENCOUNTER — CARE COORDINATION (OUTPATIENT)
Facility: CLINIC | Age: 79
End: 2024-06-25

## 2024-06-25 VITALS
HEART RATE: 56 BPM | DIASTOLIC BLOOD PRESSURE: 70 MMHG | SYSTOLIC BLOOD PRESSURE: 124 MMHG | OXYGEN SATURATION: 100 % | RESPIRATION RATE: 18 BRPM | TEMPERATURE: 96 F

## 2024-06-25 PROCEDURE — G0162 HHC RN E&M PLAN SVS, 15 MIN: HCPCS

## 2024-06-25 PROCEDURE — G0299 HHS/HOSPICE OF RN EA 15 MIN: HCPCS

## 2024-06-25 ASSESSMENT — ENCOUNTER SYMPTOMS
DYSPNEA ACTIVITY LEVEL: AFTER AMBULATING 10 - 20 FT
HEMOPTYSIS: 0

## 2024-06-25 NOTE — TELEPHONE ENCOUNTER
Voicemail left for patient to return our call at their earliest convenience.  Pt needs a TCM appointment.  He was d/c on 6/21/24 for ESRD.  TCM call will need documented and an appointment scheduled within 7 days.

## 2024-06-25 NOTE — CARE COORDINATION
Care Transitions Note    Initial Call - Call within 2 business days of discharge: Yes    Ctn Attempted to reach patient for transitions of care follow up. Unable to reach patient/family. Left a voice message with office contact information. No Pt. Medical/health information left on message.      I have been unable to reach Patient or Pt. Daughter on phone.     Transitions of Care Program is closed and resolved.     Unable to reach Letter sent via [employer].       Outreach Attempts:   HIPAA compliant voicemail left for patient.     Patient: Aniceto Interiano Sr    Patient : 1945   MRN: [id number]    Reason for Admission: ESRD (end stage renal disease) ([facility])   Discharge Date: 24  RURS: Readmission Risk Score: 7.3    Last Discharge Facility       Date Complaint Diagnosis Description Type Department Provider    24  Shortness of breath ... Admission (Discharged) [id number] Alejandro Agee MD            Was this an external facility discharge? No    Follow Up Appointment:   Patient does not have a follow up appointment scheduled at time of call. PCP appt. Request sent.     Future Appointments         Provider Specialty Dept Phone    2024 TBMargie Bob, ORI Home Health Care     2024 TBLala Trevizo PT Home Health Care     2024 12:30 PM Guillermina Dumont DO Internal Medicine [phone]    2024 2:45 PM Neeraj Martinez MD Cardiology [phone]            Radha Panchal RN

## 2024-06-25 NOTE — HOME HEALTH
bleeding gums, nosebleeds, black and tarry stool, bloody or cloudy urine, dizziness, melena, headache and coughing with blood. Notify MD if any of these s/s present.  Medications are somewhat effective at this time.      High risk medication teaching regarding anticoagulants, hyperglycemic agents or opoid narcotics performed (specify): Discussed s/e with anticoagulant as mentioned above.  MD notified of any discrepancies/look a like medications/medication interactions N/A    Home health supplies by type and quantity ordered/delivered this visit include: N/A    Patient education provided this visit to include:  Discussed intervention to prevent infection; hand washing or using hand  when touching contaminated items and avoiding sick person.    Ambulate q 2hrs as tolerated, avoid constant laying down, safety and fall prec; clearing walkway, avoid getting up too quickly when feeling weak, dizzy, and to call for assistance prn. CG to avoid leaving pt alone and reorienting pt when confused. Pacing activity/energy conservation; rest in bet activity and deep breathing exercises when having SOB. Encouraged to avoid skipping meals and hydration per limit 32 oz daily. Monitor for S/S of infection; fever 100.4, increase pain, redness, swelling, coughing with yellow thick sputum, cloudy urine with foul smell, not feeling well 2-3 days, SOB, and to call [facility] or MD for assistance if experiencing any of these S/S. To call 911 with chest pains, facial drooping, difficulty talking, non arousable/unconscious and uncontrollable bleeding.     Patient level of understanding of education provided: Pt has good understanding of the teaching provided during visit.    Sharps education provided: N/A  Home exercise program/Homework provided: Ambulation, HEP deep breathing exercises 10x when having SOB, pain and anxiety, IS 10x q 1-2 hrs.   Pt/Caregiver instructed on plan of care and are agreeable to plan of care at this

## 2024-06-26 ENCOUNTER — TELEPHONE (OUTPATIENT)
Facility: CLINIC | Age: 79
End: 2024-06-26

## 2024-06-26 NOTE — TELEPHONE ENCOUNTER
Care Transitions Initial Follow Up Call    Outreach made within 2 business days of discharge: Yes    Patient: Aniceto Interiano Sr Patient : 1945   MRN: [id number]  Reason for Admission: There are no discharge diagnoses documented for the most recent discharge.  Discharge Date: 24       Spoke with: patient daughter ( Karen )    Discharge department/facility: [facility] Interactive Patient Contact:  Was patient able to fill all prescriptions: n/a  Was patient instructed to bring all medications to the follow-up visit: Yes  Is patient taking all medications as directed in the discharge summary? Yes  Does patient understand their discharge instructions: Yes  Does patient have questions or concerns that need addressed prior to 7-14 day follow up office visit: no    Scheduled appointment with PCP within 7-14 days    Follow Up  Future Appointments   Date Time Provider Department Center   2024 To Be Determined Lala Melo PT [facility] HOME HEAL   2024 To Be Determined Mt, Jeanne, LPN [facility] HOME HEAL   2024 To Be Determined Mt, Jeanne, LPN [facility] HOME HEAL   2024 To Be Determined Mt, Jeanne, LPN [facility] HOME HEAL   2024 To Be Determined Mt, Jeanne, LPN [facility] HOME HEAL   2024 12:30 PM Guillermina Dumont DO [facility] AMB   2024  2:45 PM Neeraj Martinez MD [facility]       SPENCER OCAMPO MA         Transferred to front office to schedule follow up.

## 2024-06-28 ENCOUNTER — HOME CARE VISIT (OUTPATIENT)
Age: 79
End: 2024-06-28
Payer: MEDICARE

## 2024-06-28 VITALS
DIASTOLIC BLOOD PRESSURE: 74 MMHG | SYSTOLIC BLOOD PRESSURE: 120 MMHG | RESPIRATION RATE: 16 BRPM | OXYGEN SATURATION: 92 % | TEMPERATURE: 98.4 F | HEART RATE: 74 BPM

## 2024-06-28 PROCEDURE — G0151 HHCP-SERV OF PT,EA 15 MIN: HCPCS

## 2024-06-28 ASSESSMENT — ENCOUNTER SYMPTOMS: PAIN LOCATION - PAIN QUALITY: THROBBING

## 2024-06-28 NOTE — HOME HEALTH
Devices used by patient prior to fall: rollator walekr   Was equipment in use at time of fall? (Yes rollator walker  Injury (Yes / No), If yes, describe:  none  Emergent Care Received: (Yes / No), if yes, describe: no  Was patient identified as High Risk for falls? (Yes   List Tests Performed, Scores of Tests, and Patient Risk Factors: AIDAN Tinetti Balance test 13/28 placing pt as a high fall risk  MD Notified (name and time):  I called Dr. Agee office spoke with Lynne  notifed on fall without injury  on 6/28/24       SUBJECTIVE: I am doing ok today. Pt noted matias  LIVING SITUATION: Pt lives with daughter and grandaugher  in a first floor cluttered apartment. Pt daughter works during the day and is occassional able to work from home. Pt grandaughter is present during the day  REQUIRES CAREGIVER ASSISTANCE FOR: transportation, medications, ADLS, IADLS   PLOF: Pt was amb with rollator walker I  Pt needed A with dressing and bathing   MEDICATIONS REVIEWED AND RECONCILED: no changes  NEXT MD APPT: Dr. Agee TBD  EQUIPMENT:Rollator walker   ROM:B LE WFL round shlds with fwd head position with throacic kyphosis  STRENGTH: R hip flexors. +4/5 R quads and hamstrings 4/5 R DF 3/5 PF 4/5  L hip flexors, quads, hamstrings +4/5 L DF/PF +4/5   WOUNDS:none   BED MOBILITY:supine<.> sit MOD I  TRANSFERS:sit to stand from high bed and from very low commode with B UE support with SBA. Pt demonstrated proper handplacment   GAIT: Pt amb 35 ft with rollator walker with reciprocal gait pattern with CGA decreased B step length noted. Narrow NARCISA B feet do not consistently clear the floor during swing phase of gait Pt has decreased DF R LE during swing  phase for gait. Pt has a fwd flexed posture with a fwd head postion with a downward gaze pattern. Pt has decreaed R knee extension in stance phase of gait.   COGNITION. Pt is alert to person and place. He was able to follow simple one step commands 100%  STAIRS:NA  BALANCE: Pt

## 2024-07-02 ENCOUNTER — HOME CARE VISIT (OUTPATIENT)
Age: 79
End: 2024-07-02
Payer: MEDICARE

## 2024-07-02 VITALS
TEMPERATURE: 98.5 F | OXYGEN SATURATION: 100 % | HEART RATE: 67 BPM | SYSTOLIC BLOOD PRESSURE: 114 MMHG | DIASTOLIC BLOOD PRESSURE: 70 MMHG

## 2024-07-02 PROCEDURE — G0157 HHC PT ASSISTANT EA 15: HCPCS

## 2024-07-02 NOTE — HOME HEALTH
SUBJECTIVE: The pt is lying in bed upon today's arrival. The pts granddaughter is home, but not participatory in the visit.   .  PAIN: see pain assessment  OBJECTIVE: see interventions  .  NEXT MD APPT: 8/1/24 with Guillermina Dumont DO  .  CAREGIVER ASSISTANCE NEEDED FOR: The pt lives with his daughter and granddaughter. He requires assistance for ADLs, IADLs, safety, transportation, shopping, meals, medication management.   .  ASSESSMENT AND PROGRESS TOWARD GOALS:  The pt presents with weakness, decreased safety awareness due to cognitive deficits and environmental concerns and decreased endurance. He will continue to require increased CG support and will benefit from 2 more HHPT visits to review safety with mobility and the HEP to ensure correct technique and safety with the ther exs.   PATIENT RESPONSE TO TREATMENT: no pain with today's activities  PATIENT LEVEL OF UNDERSTANDING OF EDUCATION: Fair to poor - will require assistance for safety and compliance with the HEP.   .  CONTINUED NEED FOR THE FOLLOWING SKILLS: HH PT is medically necessary to address pain, decreased ROM, decreased strength, increased swelling, impaired bed mobility, decreased independence with functional transfers, impaired gait, impaired stair negotiation, and impaired balance in order to improve functional independence, quality of life, return to PLOF, reduce the risk for falls, and reduce pain.  .  PLAN: Plan to review the HEP and addrses safety in the home.   DISCHARGE PLANNING DISCUSSED: Discharge to self and family under MD supervision once all goals have been met or patient has reached maximum potential. Discussed with the pt and his granddaughter the POC to continue HHPT 2w1. Both are in agreement to the POC at this time.

## 2024-07-03 ENCOUNTER — HOME CARE VISIT (OUTPATIENT)
Age: 79
End: 2024-07-03
Payer: MEDICARE

## 2024-07-03 VITALS
HEART RATE: 74 BPM | DIASTOLIC BLOOD PRESSURE: 88 MMHG | TEMPERATURE: 97.5 F | RESPIRATION RATE: 18 BRPM | SYSTOLIC BLOOD PRESSURE: 134 MMHG | OXYGEN SATURATION: 98 %

## 2024-07-03 PROCEDURE — G0300 HHS/HOSPICE OF LPN EA 15 MIN: HCPCS

## 2024-07-03 ASSESSMENT — ENCOUNTER SYMPTOMS: PAIN LOCATION - PAIN QUALITY: DULL

## 2024-07-03 NOTE — HOME HEALTH
Skilled reason for visit: Patient teaching reinforcement    Caregiver involvement: daughter manages patient's medications    Medications reviewed and all medications are available in the home this visit.  Y  The following education was provided regarding medications:  N/A  MD notified of any discrepancies/look a-like medications/medication interactions: N/A  Medications are effective at this time.  Y    Home health supplies by type and quantity ordered/delivered this visit include: N/A    Patient education provided this visit: A fib, high risk medications.    Sharps education provided: N/A    Patient level of understanding of education provided: Verbalizes understanding of a fib and high risk medications, able to repeat back. May need reinforcement due to knowledge deficit.    Patient response to procedure performed: N/A    Agency Progress toward goals: agency continues visits at ordered frequency to provide teaching reinforcement.    Patient's Progress towards personal goals: Patient continues homecare and provider visits at ordered frequency.    Home exercise program: N/A    Continued need for the following skills: Patient education reinforcement.    Plan for next visit: Patient education    Patient and/or caregiver notified and agrees to changes in the Plan of Care: yes

## 2024-07-09 ENCOUNTER — HOME CARE VISIT (OUTPATIENT)
Age: 79
End: 2024-07-09
Payer: MEDICARE

## 2024-07-09 VITALS
TEMPERATURE: 97.9 F | DIASTOLIC BLOOD PRESSURE: 60 MMHG | OXYGEN SATURATION: 99 % | HEART RATE: 52 BPM | SYSTOLIC BLOOD PRESSURE: 104 MMHG

## 2024-07-09 PROCEDURE — G0157 HHC PT ASSISTANT EA 15: HCPCS

## 2024-07-09 NOTE — HOME HEALTH
SUBJECTIVE: The pt lying in the bed upon today's arrival. The pt slightly confused, which his granddaughter reports as baseline.   .  PAIN: see pain assessment    OBJECTIVE: see interventions  .  NEXT MD APPT: 8/1/24 with Tia DO Tim  .  CAREGIVER ASSISTANCE NEEDED FOR: The pt lives with his daughter and granddaughter. He requires assistance for ADLs, IADLs, safety, transportation, shopping, meals, medication management.   .  ASSESSMENT AND PROGRESS TOWARD GOALS:  The pt appears to approaching baseline and will continue to require increased care. He is limited with mobility as the home is very cluttered with limited space for alternative seating other than the bed. The pts cognitive deficits is limiting his progression and carry over due to decreased follow through with cues for gait improvements and the ability to make safe decisions. He will continue to require S for all mobliity for safety due to cognitive deficits.     PATIENT RESPONSE TO TREATMENT: no pain with today's visit.     PATIENT LEVEL OF UNDERSTANDING OF EDUCATION: Fair to poor - will require assistance for safety and compliance with the HEP.   .  PLAN: Plan for HHPT DC with Lala Melo PT next visit.      DISCHARGE PLANNING DISCUSSED: Discharge to self and family under MD supervision once all goals have been met or patient has reached maximum potential. Discussed with the pt and his granddaughter the POC to continue HHPT x 1 visit. Both are in agreement to the POC at this time.

## 2024-07-10 ENCOUNTER — HOME CARE VISIT (OUTPATIENT)
Age: 79
End: 2024-07-10
Payer: MEDICARE

## 2024-07-11 ENCOUNTER — HOME CARE VISIT (OUTPATIENT)
Age: 79
End: 2024-07-11
Payer: MEDICARE

## 2024-07-11 VITALS
TEMPERATURE: 97.4 F | OXYGEN SATURATION: 94 % | DIASTOLIC BLOOD PRESSURE: 64 MMHG | SYSTOLIC BLOOD PRESSURE: 120 MMHG | HEART RATE: 82 BPM

## 2024-07-11 PROCEDURE — G0151 HHCP-SERV OF PT,EA 15 MIN: HCPCS

## 2024-07-11 NOTE — HOME HEALTH
training, stair training, HEP training, safety training, and balance training.  On SOC 6/28/24 strength was R hip flexors. +4/5 R quads and hamstrings 4/5 R DF 3/5 PF 4/5  L hip flexors, quads, hamstrings +4/5 L DF/PF +4/5 . Today at GA strength is  R hip flexors. +4/5 R quads and hamstrings+ 4/5 R DF 3/5 PF 4/5  L hip flexors, quads, hamstrings +4/5 L DF/PF +4/5 . Pt  was able to stand up and sit down 3 consectuive times . On SOC bed mobility was supine<.> sit MOD I. On SOC transfers were sit to stand from high bed and from very low commode with B UE support with SBA. Pt demonstrated proper handplacment .Today at GA transfers are. sit to stand from bed with B UE support with B UE support for push off with S. Vc needed to reach back for surfaces for safety. Pt made progress but did not met gaol of MOD I . On [facility] gait was Pt amb 35 ft with rollator walker with reciprocal gait pattern with CGA decreased B step length noted. Narrow NARCISA B feet do not consistently clear the floor during swing phase of gait Pt has decreased DF R LE during swing  phase for gait. Pt has a fwd flexed posture with a fwd head postion with a downward gaze pattern. Pt has decreaed R knee extension in stance phase of gait.  Today at GA gait  Pt amb on flat level surfaces with S with reciprocal gait pattern with a fwd flexed posture, R LE does not consistently clear the floor during swing phase of gait. Pt has a narrowm NARCISA with amb. Unable to amb outside secondary to poor weather. Per PTA visit 7/9/24 - PTA educated the pt on increasing NARCISA due to RLE kicking the LLE during swing through phase of the gait cycle. minmal to no carry over. The pt requires S on inside surfaces with the RW due to increased fall risks and decreased safety awareness. The pt declined outside ambulation due to heat. Pt made progress but did not met goal of amb 200ft on uneven surfaces..On [facility] stairs are NA seconday to poor weather..Pt has refused amb outside on previous

## 2024-07-13 PROBLEM — Z01.818 PREOPERATIVE CLEARANCE: Status: RESOLVED | Noted: 2024-06-13 | Resolved: 2024-07-13

## 2024-07-16 ENCOUNTER — TELEPHONE (OUTPATIENT)
Age: 79
End: 2024-07-16

## 2024-07-16 ENCOUNTER — HOME CARE VISIT (OUTPATIENT)
Age: 79
End: 2024-07-16
Payer: MEDICARE

## 2024-07-16 VITALS
DIASTOLIC BLOOD PRESSURE: 60 MMHG | HEART RATE: 53 BPM | TEMPERATURE: 97 F | RESPIRATION RATE: 16 BRPM | OXYGEN SATURATION: 97 % | SYSTOLIC BLOOD PRESSURE: 110 MMHG

## 2024-07-16 PROCEDURE — G0300 HHS/HOSPICE OF LPN EA 15 MIN: HCPCS

## 2024-07-16 NOTE — TELEPHONE ENCOUNTER
Mercy from [facility] called and wanted to follow up the surgery for patient. Patient needs a Left  Upper Extremity Arteriovenous Graft Revision. Planning to schedule patient for August 13, Tuesday. Will call daughter to see if this date works for her. Will update facility once scheduled.

## 2024-07-16 NOTE — HOME HEALTH
Skilled Needs: Education   Caregiver involvement: Pt independent with care with the help of family    Medications reviewed and all medications are available in the home this visit.    The following education was provided regarding medications:  JUANJO BIRD notified of any discrepancies/look a-like medications/medication interactions: JUANJO  Medications are effecrtive at this time.    Home health supplies by type and quantity ordered/delivered this visit include:  Supplies available   Patient education provided this visit:  Blood Pressure is WNR HR is within range. No s/s of infection no open areas and no falls since last visit. Pt conitinues to attend dialysis 3x  week.   Sharps education provided: JUANJO  Patient level of understanding of education provided: Verbalzied all understanding to above education  Skilled Care Performed this visit: Education  Patient response to procedure performed: JUANJO  Agency Progress toward goals: Progressing toward interventions above  Patient's Progress towards personal goals: when patient reaches goals and medication is managed, and disease processes are understood patient agrees and understand that discharge will take place

## 2024-07-22 PROBLEM — T82.898A ARTERIOVENOUS FISTULA OCCLUSION (HCC): Status: ACTIVE | Noted: 2024-06-21

## 2024-07-23 DIAGNOSIS — I10 PRIMARY HYPERTENSION: ICD-10-CM

## 2024-07-23 RX ORDER — AMLODIPINE BESYLATE 10 MG/1
10 TABLET ORAL DAILY
Qty: 90 TABLET | Refills: 2 | Status: SHIPPED | OUTPATIENT
Start: 2024-07-23

## 2024-07-24 ENCOUNTER — HOME CARE VISIT (OUTPATIENT)
Age: 79
End: 2024-07-24
Payer: MEDICARE

## 2024-07-24 VITALS
HEART RATE: 84 BPM | SYSTOLIC BLOOD PRESSURE: 128 MMHG | DIASTOLIC BLOOD PRESSURE: 70 MMHG | TEMPERATURE: 97.6 F | RESPIRATION RATE: 17 BRPM

## 2024-07-24 PROCEDURE — G0299 HHS/HOSPICE OF RN EA 15 MIN: HCPCS

## 2024-07-24 ASSESSMENT — ENCOUNTER SYMPTOMS: DYSPNEA ACTIVITY LEVEL: AFTER AMBULATING 10 - 20 FT

## 2024-07-24 NOTE — HOME HEALTH
Skilled reason for visit: discharge    Caregiver involvement: daughter assist with ADLS/meal prep and med management.    Medications reviewed and all medications are available in the home this visit.    The following education was provided regarding medications:  reviewed all medication bottles in home for frequency, side effects and precautions. verbalized understanding and repeat back    Medications are effective at this time.        Patient education provided this visit:see discharge summary/intervention tab          Patient level of understanding of education provided:see discharge summary/intervention tab            Agency Progress toward goals: all goals met    Patient's Progress towards personal goals: all goals met

## 2024-08-08 NOTE — PERIOP NOTE
Instructions for your surgery at [facility]      Today's Date:  8/8/2024      Patient's Name:  Aniceto Interiano Sr           Surgery Date:  8/16/2024              Please enter the main entrance of the hospital and check-in at the front security desk located in the lobby. They will direct you to the area to report for your surgery.     Do NOT eat or drink anything, including candy, gum, or ice chips after midnight prior to your surgery, unless you have specific instructions from your surgeon or anesthesia provider to do so.  Brush your teeth before coming to the hospital. You may swish with water, but do not swallow.  No smoking/Vaping/E-Cigarettes 24 hours prior to the day of surgery.  No alcohol 24 hours prior to the day of surgery.  No recreational drugs for one week prior to the day of surgery.  Bring Photo ID, Insurance information, and Co-pay if required on day of surgery.  Bring in pertinent legal documents, such as, Medical Power of , DNR, Advance Directive, etc.  Leave all valuables, including money/purse, at home.  Remove all jewelry, including ALL body piercings, nail polish, acrylic nails, and makeup (including mascara); no lotions, powders, deodorant, or perfume/cologne/after shave on the skin.  Follow instruction for Hibiclens washes and CHG wipes from surgeon's office.   Glasses and dentures may be worn to the hospital. They must be removed prior to surgery. Please bring case/container for glasses or dentures.   Contact lenses should not be worn on day of surgery.   Call your doctor's office if symptoms of a cold or illness develop within 24-48 hours prior to your surgery.  Call your doctor's office if you have any questions concerning insurance or co-pays.  15. AN ADULT (relative or friend 18 years or older) MUST DRIVE YOU HOME AFTER YOUR SURGERY.  16. Please make arrangements for a responsible adult (18 years or older) to be with you for 24 hours after your surgery.   17.  TWO VISITORS will be allowed in the waiting area during your surgery.  Exceptions may be made for surgical admissions, per nursing unit guidelines      Special Instructions:      Bring a list of CURRENT medications.  Follow instructions from the office regarding Blood Thinners and/or Insulin. Eliquis  Follow instructions from the office regarding medications to take the morning of surgery.       If you have a history of recreational drug use, you may be required to submit a urine sample for drug testing the day of your procedure, as some recreational drugs can interact with anesthetics and increase your surgical risk.    On day of surgery if you are running late, unable to make procedure time, or sick, please call the Pre-op department at [phone]    These surgical instructions were reviewed with Karen Bonilla for Aniceto Interiano during the PAT phone call.

## 2024-08-15 ENCOUNTER — TELEPHONE (OUTPATIENT)
Age: 79
End: 2024-08-15

## 2024-08-15 ENCOUNTER — ANESTHESIA EVENT (OUTPATIENT)
Dept: CARDIOTHORACIC SURGERY | Facility: HOSPITAL | Age: 79
End: 2024-08-15
Payer: MEDICARE

## 2024-08-15 NOTE — TELEPHONE ENCOUNTER
Spoke to patients daughterKaren on 8/15/2024 at 3:00pm about surgery scheduled on 8/16/2024 with Dr. Agee.   .   Patient instructions:   Check in at [facility] Main entrance  at 9:00am.  Do not eat, drink or chew gum after midnight prior to surgery.   Only take heart and blood pressure medication with a sip of water the morning of the procedure.   Last dose of Eliquis was on 8/13/2024.   Patient instructed to have RIDE available when discharged from hospital. Patient is not allowed to drive, walk or go home using public transportation (including Lyft and Uber).   Patient confirmed and repeated instructions.

## 2024-08-15 NOTE — TELEPHONE ENCOUNTER
Called daughter, Karen Interiano and left message at 8:45am to remind her of patients surgery scheduled on 8/16/24 with Dr. Agee. Waiting for patient to call back.

## 2024-08-16 ENCOUNTER — ANESTHESIA (OUTPATIENT)
Dept: CARDIOTHORACIC SURGERY | Facility: HOSPITAL | Age: 79
End: 2024-08-16
Payer: MEDICARE

## 2024-08-16 ENCOUNTER — HOSPITAL ENCOUNTER (OUTPATIENT)
Facility: HOSPITAL | Age: 79
Setting detail: OUTPATIENT SURGERY
Discharge: HOME | End: 2024-08-16
Attending: SURGERY | Admitting: SURGERY
Payer: MEDICARE

## 2024-08-16 VITALS
DIASTOLIC BLOOD PRESSURE: 67 MMHG | WEIGHT: 174 LBS | TEMPERATURE: 97.7 F | SYSTOLIC BLOOD PRESSURE: 131 MMHG | OXYGEN SATURATION: 95 % | RESPIRATION RATE: 15 BRPM | HEIGHT: 75 IN | HEART RATE: 66 BPM | BODY MASS INDEX: 21.64 KG/M2

## 2024-08-16 DIAGNOSIS — N18.6 END STAGE RENAL DISEASE (HCC): ICD-10-CM

## 2024-08-16 DIAGNOSIS — T82.898D ARTERIOVENOUS FISTULA OCCLUSION, SUBSEQUENT ENCOUNTER: Primary | ICD-10-CM

## 2024-08-16 LAB
ANION GAP SERPL CALC-SCNC: 9 MMOL/L (ref 3–18)
BUN SERPL-MCNC: 57 MG/DL (ref 7–18)
BUN/CREAT SERPL: 6 (ref 12–20)
CALCIUM SERPL-MCNC: 9.8 MG/DL (ref 8.5–10.1)
CHLORIDE SERPL-SCNC: 100 MMOL/L (ref 100–111)
CO2 SERPL-SCNC: 26 MMOL/L (ref 21–32)
CREAT SERPL-MCNC: 9.38 MG/DL (ref 0.6–1.3)
GLUCOSE SERPL-MCNC: 84 MG/DL (ref 74–99)
POTASSIUM SERPL-SCNC: 5 MMOL/L (ref 3.5–5.5)
SODIUM SERPL-SCNC: 135 MMOL/L (ref 136–145)

## 2024-08-16 PROCEDURE — A4217 STERILE WATER/SALINE, 500 ML: HCPCS | Performed by: SURGERY

## 2024-08-16 PROCEDURE — 2720000010 HC SURG SUPPLY STERILE: Performed by: SURGERY

## 2024-08-16 PROCEDURE — 36830 ARTERY-VEIN NONAUTOGRAFT: CPT | Performed by: SURGERY

## 2024-08-16 PROCEDURE — 7100000010 HC PHASE II RECOVERY - FIRST 15 MIN: Performed by: SURGERY

## 2024-08-16 PROCEDURE — 6360000002 HC RX W HCPCS: Performed by: SURGERY

## 2024-08-16 PROCEDURE — 2700000000 HC OXYGEN THERAPY PER DAY

## 2024-08-16 PROCEDURE — C1768 GRAFT, VASCULAR: HCPCS | Performed by: SURGERY

## 2024-08-16 PROCEDURE — 6360000002 HC RX W HCPCS: Performed by: ANESTHESIOLOGY

## 2024-08-16 PROCEDURE — 2500000003 HC RX 250 WO HCPCS: Performed by: ANESTHESIOLOGY

## 2024-08-16 PROCEDURE — 3600000012 HC SURGERY LEVEL 2 ADDTL 15MIN: Performed by: SURGERY

## 2024-08-16 PROCEDURE — 7100000000 HC PACU RECOVERY - FIRST 15 MIN: Performed by: SURGERY

## 2024-08-16 PROCEDURE — 3700000001 HC ADD 15 MINUTES (ANESTHESIA): Performed by: SURGERY

## 2024-08-16 PROCEDURE — 2580000003 HC RX 258: Performed by: NURSE ANESTHETIST, CERTIFIED REGISTERED

## 2024-08-16 PROCEDURE — 2709999900 HC NON-CHARGEABLE SUPPLY: Performed by: SURGERY

## 2024-08-16 PROCEDURE — 2580000003 HC RX 258: Performed by: SURGERY

## 2024-08-16 PROCEDURE — 7100000001 HC PACU RECOVERY - ADDTL 15 MIN: Performed by: SURGERY

## 2024-08-16 PROCEDURE — 2500000003 HC RX 250 WO HCPCS: Performed by: SURGERY

## 2024-08-16 PROCEDURE — 7100000011 HC PHASE II RECOVERY - ADDTL 15 MIN: Performed by: SURGERY

## 2024-08-16 PROCEDURE — 3700000000 HC ANESTHESIA ATTENDED CARE: Performed by: SURGERY

## 2024-08-16 PROCEDURE — 94761 N-INVAS EAR/PLS OXIMETRY MLT: CPT

## 2024-08-16 PROCEDURE — 6370000000 HC RX 637 (ALT 250 FOR IP): Performed by: NURSE ANESTHETIST, CERTIFIED REGISTERED

## 2024-08-16 PROCEDURE — 80048 BASIC METABOLIC PNL TOTAL CA: CPT

## 2024-08-16 PROCEDURE — 3600000002 HC SURGERY LEVEL 2 BASE: Performed by: SURGERY

## 2024-08-16 DEVICE — GRAFT VASC L45CM DIA4-7MM PTFE CBAS HEP SURF STD WALLED: Type: IMPLANTABLE DEVICE | Status: FUNCTIONAL

## 2024-08-16 RX ORDER — IPRATROPIUM BROMIDE AND ALBUTEROL SULFATE 2.5; .5 MG/3ML; MG/3ML
1 SOLUTION RESPIRATORY (INHALATION)
Status: DISCONTINUED | OUTPATIENT
Start: 2024-08-16 | End: 2024-08-16 | Stop reason: HOSPADM

## 2024-08-16 RX ORDER — HEPARIN SODIUM 200 [USP'U]/100ML
INJECTION, SOLUTION INTRAVENOUS
Status: DISCONTINUED
Start: 2024-08-16 | End: 2024-08-16 | Stop reason: HOSPADM

## 2024-08-16 RX ORDER — FENTANYL CITRATE 50 UG/ML
INJECTION, SOLUTION INTRAMUSCULAR; INTRAVENOUS PRN
Status: DISCONTINUED | OUTPATIENT
Start: 2024-08-16 | End: 2024-08-16 | Stop reason: SDUPTHER

## 2024-08-16 RX ORDER — GLYCOPYRROLATE 0.2 MG/ML
INJECTION INTRAMUSCULAR; INTRAVENOUS PRN
Status: DISCONTINUED | OUTPATIENT
Start: 2024-08-16 | End: 2024-08-16 | Stop reason: SDUPTHER

## 2024-08-16 RX ORDER — NALOXONE HYDROCHLORIDE 0.4 MG/ML
INJECTION, SOLUTION INTRAMUSCULAR; INTRAVENOUS; SUBCUTANEOUS PRN
Status: DISCONTINUED | OUTPATIENT
Start: 2024-08-16 | End: 2024-08-16 | Stop reason: HOSPADM

## 2024-08-16 RX ORDER — SODIUM CHLORIDE 9 MG/ML
INJECTION, SOLUTION INTRAVENOUS CONTINUOUS
Status: DISCONTINUED | OUTPATIENT
Start: 2024-08-16 | End: 2024-08-16 | Stop reason: HOSPADM

## 2024-08-16 RX ORDER — INSULIN LISPRO 100 [IU]/ML
0-15 INJECTION, SOLUTION INTRAVENOUS; SUBCUTANEOUS ONCE
Status: CANCELLED | OUTPATIENT
Start: 2024-08-16 | End: 2024-08-16

## 2024-08-16 RX ORDER — ONDANSETRON 2 MG/ML
4 INJECTION INTRAMUSCULAR; INTRAVENOUS
Status: DISCONTINUED | OUTPATIENT
Start: 2024-08-16 | End: 2024-08-16 | Stop reason: HOSPADM

## 2024-08-16 RX ORDER — FENTANYL CITRATE 50 UG/ML
50 INJECTION, SOLUTION INTRAMUSCULAR; INTRAVENOUS EVERY 5 MIN PRN
Status: DISCONTINUED | OUTPATIENT
Start: 2024-08-16 | End: 2024-08-16 | Stop reason: HOSPADM

## 2024-08-16 RX ORDER — PROCHLORPERAZINE EDISYLATE 5 MG/ML
5 INJECTION INTRAMUSCULAR; INTRAVENOUS
Status: DISCONTINUED | OUTPATIENT
Start: 2024-08-16 | End: 2024-08-16 | Stop reason: HOSPADM

## 2024-08-16 RX ORDER — MAGNESIUM HYDROXIDE 1200 MG/15ML
LIQUID ORAL CONTINUOUS PRN
Status: COMPLETED | OUTPATIENT
Start: 2024-08-16 | End: 2024-08-16

## 2024-08-16 RX ORDER — VASOPRESSIN 20 U/ML
INJECTION PARENTERAL PRN
Status: DISCONTINUED | OUTPATIENT
Start: 2024-08-16 | End: 2024-08-16 | Stop reason: SDUPTHER

## 2024-08-16 RX ORDER — LIDOCAINE HYDROCHLORIDE 20 MG/ML
INJECTION, SOLUTION EPIDURAL; INFILTRATION; INTRACAUDAL; PERINEURAL PRN
Status: DISCONTINUED | OUTPATIENT
Start: 2024-08-16 | End: 2024-08-16 | Stop reason: SDUPTHER

## 2024-08-16 RX ORDER — LIDOCAINE HYDROCHLORIDE 10 MG/ML
1 INJECTION, SOLUTION EPIDURAL; INFILTRATION; INTRACAUDAL; PERINEURAL
Status: DISCONTINUED | OUTPATIENT
Start: 2024-08-16 | End: 2024-08-16 | Stop reason: HOSPADM

## 2024-08-16 RX ORDER — MEPERIDINE HYDROCHLORIDE 25 MG/ML
12.5 INJECTION INTRAMUSCULAR; INTRAVENOUS; SUBCUTANEOUS EVERY 5 MIN PRN
Status: DISCONTINUED | OUTPATIENT
Start: 2024-08-16 | End: 2024-08-16 | Stop reason: HOSPADM

## 2024-08-16 RX ORDER — HEPARIN SODIUM 1000 [USP'U]/ML
INJECTION, SOLUTION INTRAVENOUS; SUBCUTANEOUS PRN
Status: DISCONTINUED | OUTPATIENT
Start: 2024-08-16 | End: 2024-08-16 | Stop reason: SDUPTHER

## 2024-08-16 RX ORDER — PROPOFOL 10 MG/ML
INJECTION, EMULSION INTRAVENOUS PRN
Status: DISCONTINUED | OUTPATIENT
Start: 2024-08-16 | End: 2024-08-16 | Stop reason: SDUPTHER

## 2024-08-16 RX ORDER — DEXTROSE MONOHYDRATE 100 MG/ML
INJECTION, SOLUTION INTRAVENOUS CONTINUOUS PRN
Status: CANCELLED | OUTPATIENT
Start: 2024-08-16

## 2024-08-16 RX ORDER — ONDANSETRON 2 MG/ML
INJECTION INTRAMUSCULAR; INTRAVENOUS PRN
Status: DISCONTINUED | OUTPATIENT
Start: 2024-08-16 | End: 2024-08-16 | Stop reason: SDUPTHER

## 2024-08-16 RX ORDER — SODIUM CHLORIDE, SODIUM LACTATE, POTASSIUM CHLORIDE, CALCIUM CHLORIDE 600; 310; 30; 20 MG/100ML; MG/100ML; MG/100ML; MG/100ML
INJECTION, SOLUTION INTRAVENOUS CONTINUOUS
Status: DISCONTINUED | OUTPATIENT
Start: 2024-08-16 | End: 2024-08-16 | Stop reason: HOSPADM

## 2024-08-16 RX ORDER — OXYCODONE HYDROCHLORIDE 5 MG/1
5 TABLET ORAL EVERY 6 HOURS PRN
Qty: 8 TABLET | Refills: 0 | Status: SHIPPED | OUTPATIENT
Start: 2024-08-16 | End: 2024-08-21

## 2024-08-16 RX ORDER — FAMOTIDINE 20 MG/1
20 TABLET, FILM COATED ORAL ONCE
Status: COMPLETED | OUTPATIENT
Start: 2024-08-16 | End: 2024-08-16

## 2024-08-16 RX ORDER — EPHEDRINE SULFATE/0.9% NACL/PF 25 MG/5 ML
SYRINGE (ML) INTRAVENOUS PRN
Status: DISCONTINUED | OUTPATIENT
Start: 2024-08-16 | End: 2024-08-16 | Stop reason: SDUPTHER

## 2024-08-16 RX ORDER — HEPARIN SODIUM 200 [USP'U]/100ML
INJECTION, SOLUTION INTRAVENOUS CONTINUOUS PRN
Status: COMPLETED | OUTPATIENT
Start: 2024-08-16 | End: 2024-08-16

## 2024-08-16 RX ORDER — DEXAMETHASONE SODIUM PHOSPHATE 4 MG/ML
INJECTION, SOLUTION INTRA-ARTICULAR; INTRALESIONAL; INTRAMUSCULAR; INTRAVENOUS; SOFT TISSUE PRN
Status: DISCONTINUED | OUTPATIENT
Start: 2024-08-16 | End: 2024-08-16 | Stop reason: SDUPTHER

## 2024-08-16 RX ADMIN — EPHEDRINE SULFATE 10 MG: 5 INJECTION INTRAVENOUS at 11:57

## 2024-08-16 RX ADMIN — ONDANSETRON 4 MG: 2 INJECTION INTRAMUSCULAR; INTRAVENOUS at 13:09

## 2024-08-16 RX ADMIN — FENTANYL CITRATE 25 MCG: 50 INJECTION INTRAMUSCULAR; INTRAVENOUS at 12:15

## 2024-08-16 RX ADMIN — VASOPRESSIN 1 UNITS: 20 INJECTION INTRAVENOUS at 12:59

## 2024-08-16 RX ADMIN — SODIUM CHLORIDE: 9 INJECTION, SOLUTION INTRAVENOUS at 11:01

## 2024-08-16 RX ADMIN — VASOPRESSIN 1 UNITS: 20 INJECTION INTRAVENOUS at 12:17

## 2024-08-16 RX ADMIN — LIDOCAINE HYDROCHLORIDE 100 MG: 20 INJECTION, SOLUTION EPIDURAL; INFILTRATION; INTRACAUDAL; PERINEURAL at 11:50

## 2024-08-16 RX ADMIN — FAMOTIDINE 20 MG: 20 TABLET ORAL at 11:00

## 2024-08-16 RX ADMIN — GLYCOPYRROLATE 0.2 MG: 0.2 INJECTION INTRAMUSCULAR; INTRAVENOUS at 13:09

## 2024-08-16 RX ADMIN — VASOPRESSIN 1 UNITS: 20 INJECTION INTRAVENOUS at 12:41

## 2024-08-16 RX ADMIN — HYDROMORPHONE HYDROCHLORIDE 0.5 MG: 1 INJECTION, SOLUTION INTRAMUSCULAR; INTRAVENOUS; SUBCUTANEOUS at 14:29

## 2024-08-16 RX ADMIN — DEXAMETHASONE SODIUM PHOSPHATE 4 MG: 4 INJECTION INTRA-ARTICULAR; INTRALESIONAL; INTRAMUSCULAR; INTRAVENOUS; SOFT TISSUE at 13:09

## 2024-08-16 RX ADMIN — EPHEDRINE SULFATE 10 MG: 5 INJECTION INTRAVENOUS at 12:07

## 2024-08-16 RX ADMIN — FENTANYL CITRATE 25 MCG: 50 INJECTION INTRAMUSCULAR; INTRAVENOUS at 12:05

## 2024-08-16 RX ADMIN — WATER 1000 MG: 1 INJECTION, SOLUTION INTRAMUSCULAR; INTRAVENOUS; SUBCUTANEOUS at 12:00

## 2024-08-16 RX ADMIN — EPHEDRINE SULFATE 5 MG: 5 INJECTION INTRAVENOUS at 12:14

## 2024-08-16 RX ADMIN — PROPOFOL 120 MG: 10 INJECTION, EMULSION INTRAVENOUS at 11:52

## 2024-08-16 RX ADMIN — GLYCOPYRROLATE 0.2 MG: 0.2 INJECTION INTRAMUSCULAR; INTRAVENOUS at 12:02

## 2024-08-16 RX ADMIN — HEPARIN SODIUM 3000 UNITS: 1000 INJECTION INTRAVENOUS; SUBCUTANEOUS at 12:28

## 2024-08-16 ASSESSMENT — PAIN DESCRIPTION - LOCATION: LOCATION: ARM;HAND

## 2024-08-16 ASSESSMENT — PAIN DESCRIPTION - DESCRIPTORS: DESCRIPTORS: OTHER (COMMENT)

## 2024-08-16 ASSESSMENT — PAIN - FUNCTIONAL ASSESSMENT
PAIN_FUNCTIONAL_ASSESSMENT: ACTIVITIES ARE NOT PREVENTED
PAIN_FUNCTIONAL_ASSESSMENT: NONE - DENIES PAIN

## 2024-08-16 ASSESSMENT — PAIN DESCRIPTION - ORIENTATION: ORIENTATION: LEFT

## 2024-08-16 ASSESSMENT — ENCOUNTER SYMPTOMS: SHORTNESS OF BREATH: 1

## 2024-08-16 ASSESSMENT — PAIN SCALES - GENERAL: PAINLEVEL_OUTOF10: 10

## 2024-08-16 NOTE — DISCHARGE INSTRUCTIONS
[facility]    Discharge Information    Procedure(s) Performed: LEFT UPPER EXTREMITY ARTERIOVENOUS GRAFT PLACEMENT    Surgeon: Dr. Alejandro Agee MD    Discharge Instructions:  Remove the surgical bandage in 48 hours. At that point, you may shower as needed. Allow the soap and water to run over your incision(s) and gently pat dry. No tub bathing for 8 weeks.   No lifting > 10 lbs for 8 weeks.   Do not apply cream or ointment to the incisions. Keep a dry gauze over the incision(s) if there is mild drainage.   If the incisions develop redness or have worsening drainage, call the vascular clinic or seek emergency care immediately.   Follow up in vascular surgery clinic with Dr. Agee as scheduled.   Do not drive until you are evaluated at your postoperative visit with Dr. Agee.  Take oxycodone and tylenol as needed for pain.   Medications: Restart Eliquis in 1 week, on August 23, 2204. No other changes to your home medications.   Call our clinic and/or seek emergency care if you develop a fever, chills, shortness of breath, severe pain, wound drainage, wound redness, or any other concerns.     Vascular Clinic Phone Number: [phone]      DISCHARGE SUMMARY from Nurse    PATIENT INSTRUCTIONS:    After general anesthesia or intravenous sedation, for 24 hours or while taking prescription Narcotics:  Limit your activities  Do not drive and operate hazardous machinery  Do not make important personal or business decisions  Do  not drink alcoholic beverages  If you have not urinated within 8 hours after discharge, please contact your surgeon on call.    Report the following to your surgeon:  Excessive pain, swelling, redness or odor of or around the surgical area  Temperature over 100.5  Nausea and vomiting lasting longer than 4 hours or if unable to take medications  Any signs of decreased circulation or nerve impairment to extremity: change in color, persistent  numbness,  tingling, coldness or increase pain  Any questions          These are general instructions for a healthy lifestyle:    No smoking/ No tobacco products/ Avoid exposure to second hand smoke  Surgeon General's Warning:  Quitting smoking now greatly reduces serious risk to your health.    Obesity, smoking, and sedentary lifestyle greatly increases your risk for illness    A healthy diet, regular physical exercise & weight monitoring are important for maintaining a healthy lifestyle    You may be retaining fluid if you have a history of heart failure or if you experience any of the following symptoms:  Weight gain of 3 pounds or more overnight or 5 pounds in a week, increased swelling in our hands or feet or shortness of breath while lying flat in bed.  Please call your doctor as soon as you notice any of these symptoms; do not wait until your next office visit.        The discharge information has been reviewed with the patient.  The patient verbalized understanding.  Discharge medications reviewed with the patient and appropriate educational materials and side effects teaching were provided.  ___________________________________________________________________________________________________________________________________

## 2024-08-16 NOTE — ANESTHESIA POSTPROCEDURE EVALUATION
Department of Anesthesiology  Postprocedure Note    Patient: Aniceto Interiano Sr  MRN: [id number]  YOB: 1945  Date of evaluation: 8/16/2024    Procedure Summary       Date: 08/16/24 Room / Location: [facility] CV 02 / [facility] CARDIAC SURGERY    Anesthesia Start: 1130 Anesthesia Stop: 1356    Procedure: LEFT ARM BRACHIAL AXILLARY ARTERIOVENOUS GRAFT PLACEMENT (Left) Diagnosis:       Arteriovenous fistula occlusion (HCC)      End stage renal disease (HCC)      (Arteriovenous fistula occlusion (HCC) [T82.898A])      (End stage renal disease (HCC) [N18.6])    Surgeons: Alejandro Agee MD Responsible Provider: Faraz Bradshaw MD    Anesthesia Type: General ASA Status: 3            Anesthesia Type: General    Harry Phase I: Harry Score: 9    Harry Phase II: Harry Score: 10    Anesthesia Post Evaluation    Patient location during evaluation: bedside  Patient participation: complete - patient participated  Level of consciousness: responsive to verbal stimuli  Airway patency: patent  Nausea & Vomiting: no nausea  Respiratory status: acceptable  Hydration status: euvolemic    No notable events documented.

## 2024-08-16 NOTE — ANESTHESIA PRE PROCEDURE
Department of Anesthesiology  Preprocedure Note       Name:  Aniceto Interiano Sr   Age:  78 y.o.  :  1945                                          MRN:  [id number]         Date:  2024      Surgeon: Surgeon(s):  Alejandro Agee MD    Procedure: Procedure(s):  LEFT UPPER EXTREMITY ARTERIOVENOUS FISTULA REVISION    Medications prior to admission:   Prior to Admission medications    Medication Sig Start Date End Date Taking? Authorizing Provider   amLODIPine (NORVASC) 10 MG tablet TAKE 1 TABLET BY MOUTH EVERY DAY 24  Yes Guillermina Dumont DO   VELPHORO 500 MG CHEW chewable tablet CHEW AND SWALLOW 1 TABLET BY MOUTH THREE TIMES DAILY WITH MEALS 24  Yes Guillermina Dumont DO   apixaban (ELIQUIS) 2.5 MG TABS tablet Take 1 tablet by mouth 2 times daily 24  Yes Neeraj Martinez MD   Ascorbic Acid (VITAMIN C ER PO) Take 500 mg by mouth daily   Yes ProviderYasmine MD   Cholecalciferol (VITAMIN D-3 PO) Take 1,000 Units by mouth daily   Yes ProviderYasmine MD   Cyanocobalamin (VITAMIN B-12 CR PO) Take 1,000 mcg by mouth daily   Yes ProviderYasmine MD   promethazine (PHENERGAN) 12.5 MG tablet Take 1 tablet by mouth every 6 hours as needed for Nausea 23  Yes Guillermina Dumont DO   NONFORMULARY     ProviderYasmine MD   acetaminophen (TYLENOL 8 HOUR ARTHRITIS PAIN) 650 MG extended release tablet Take 1 tablet by mouth every 8 hours as needed for Pain  Patient not taking: Reported on 2024   Guillermina Dumont DO       Current medications:    Current Facility-Administered Medications   Medication Dose Route Frequency Provider Last Rate Last Admin   • lidocaine PF 1 % injection 1 mL  1 mL IntraDERmal Once PRN Brein, [place], APRN - CRNA       • 0.9 % sodium chloride infusion   IntraVENous Continuous Brein, [place], APRN - CRNA 25 mL/hr at 24 1101 New Bag at 24 1101       Allergies:    Allergies   Allergen Reactions   • Influenza Virus Vaccine Other (See Comments)     Patient

## 2024-08-16 NOTE — BRIEF OP NOTE
Brief Postoperative Note      Patient: Aniceto Interiano Sr  YOB: 1945  MRN: [id number]    Date of Procedure: 8/16/2024    Preop Dx: ESRD with LUE AVG occlusion    Post-Op Diagnosis: Same       Procedure(s):  LEFT ARM BRACHIAL AXILLARY ARTERIOVENOUS GRAFT PLACEMENT with a 4-7 tapered GORE graft, placed just lateral to prior thrombosed graft    Surgeon(s):  Alejandro Agee MD    Assistant:  Surgical Assistant: Ken Corral; Dada Ferraro    Anesthesia: General    Estimated Blood Loss (mL): 10mL    Complications: none    Specimens:   * No specimens in log *    Implants:  Implant Name Type Inv. Item Serial No.  Lot No. LRB No. Used Action   GRAFT VASC L45CM DIA4-7MM PTFE CBAS HEP SURF STD WALLED - [id number] Vascular grafts GRAFT VASC L45CM DIA4-7MM PTFE CBAS HEP SURF STD WALLED [id number]  [employer] N/A Left 1 Implanted         Drains: * No LDAs found *    Findings:  Infection Present At Time Of Surgery (PATOS) (choose all levels that have infection present):  No infection present  Other Findings: Patent and healthy brachial artery, just distal to previous graft anastomosis. Patent and healthy axillary vein just proximal to prior graft anastomosis.     Disposition: to recovery in stable condition    Electronically signed by Alejandro Agee MD on 8/16/2024 at 1:25 PM

## 2024-08-16 NOTE — H&P
[facility]       History:   79 y/o M with ESRD and a thrombosed LUE AVG. Thrombectomy attempts were unsuccessful 2 months prior and he undergoes HD via a TDC. He completed a cardiology eval and was deemed low risk. He was recommended to undergo a LUE graft revision and/or replacement. No changes to his history.         Past Medical History:   Diagnosis Date    Atrial fibrillation (HCC)     DVT (deep venous thrombosis) (HCC)     ESRD (end stage renal disease) (HCC)     Hemodialysis patient (HCC)     [place] (hard of hearing)     Hx of blood clots     Hypertension     TIA (transient ischemic attack)     1994     Past Surgical History:   Procedure Laterality Date    CARDIAC PROCEDURE N/A 6/13/2024    Intravascular ultrasound performed by Alejandro Agee MD at [facility] CARDIAC CATH LAB    DIALYSIS FISTULA CREATION Left 4/19/2024    LEFT UPPER EXTREMITY BRACHIAL-AXILLARY ARTERIOVENOUS GRAFT CREATION performed by Alejandro Agee MD at [facility] CARDIAC SURGERY    EYE SURGERY      INVASIVE VASCULAR N/A 1/30/2024    Ultrasound guided vascular access performed by Serjio Anaya MD at [facility] CARDIAC CATH LAB    INVASIVE VASCULAR N/A 4/25/2024    Tunnel dialysis catheter exchange performed by Ricky Martinez MD at [facility] CARDIAC CATH LAB    INVASIVE VASCULAR N/A 6/13/2024    Fistulogram left performed by Alejandro Agee MD at [facility] CARDIAC CATH LAB    INVASIVE VASCULAR N/A 6/13/2024    Angioplasty fistula/dialysis circuit performed by Alejandro Agee MD at [facility] CARDIAC CATH LAB    INVASIVE VASCULAR N/A 6/13/2024    Thrombectomy peripheral artery performed by Alejandro Agee MD at [facility] CARDIAC CATH LAB    INVASIVE VASCULAR N/A 6/13/2024    Tunnel dialysis catheter removal performed by Alejandro Agee MD at [facility] CARDIAC CATH LAB    INVASIVE VASCULAR N/A 6/13/2024    Tunnel dialysis catheter insertion performed by Alejandro Agee MD at [facility]

## 2024-08-20 NOTE — OP NOTE
inability to intervene.  The patient expressed understanding after the opportunity to ask questions, and consented to the procedure.     PROCEDURE: The pt was brought to the CVT OR and placed supine on the OR table. General anesthesia was induced. IV abx were administered. The LUE was prepped and draped in the usual sterile fashion. Timeout was performed.     The brachial artery was exposed in the distal upper arm, just distal to his previously-placed and occluded brachial-axillary graft. The artery was calcific, but patent, at this level. The axillary vein was exposed in the proximal upper arm, just proximal to the prior incision. The vein was healthy at this level.     A subcutaneous tunnel was made between the two incisions, just lateral to the prior graft. IV heparin was administered. A 4-7 tapered GORE graft was passed through the tunnel. The brachial artery was clamped and a longitudinal arteriotomy was made. The 4mm end of the graft was beveled. An end to side anastomosis was performed with a 6-0 GORE suture. Backbleeding and forward flushing were performed prior to completion of the anastomosis.     The axillary vein was clamped and a longitudinal venotomy was made. The 7mm segment of the graft was trimmed and beveled. An end to side anastomosis was performed with a 5-0 GORE suture. Backbleeding and forward flushing were performed prior to completion of the anastomosis. There was a strong thrill in the graft. There was a strong biphasic brachial artery signal distal to the graft, and monophasic radial and ulnar artery signals at the wrist.     Protamine was administered. The deep dermal layer of the incisions was closed with interrupted absorbable suture. The skin was closed with interrupted 3-0 nylon sutures. DSD were applied.     The pt tolerated the procedure well. All instrument, sponge, and needle counts were correct. The pt was awakened and taken to recovery in stable condition.     Alejandro Gregorio

## 2024-09-03 ENCOUNTER — OFFICE VISIT (OUTPATIENT)
Age: 79
End: 2024-09-03

## 2024-09-03 VITALS
WEIGHT: 175 LBS | BODY MASS INDEX: 21.76 KG/M2 | SYSTOLIC BLOOD PRESSURE: 80 MMHG | HEIGHT: 75 IN | DIASTOLIC BLOOD PRESSURE: 60 MMHG

## 2024-09-03 DIAGNOSIS — N18.6 END STAGE RENAL DISEASE (HCC): Primary | ICD-10-CM

## 2024-09-03 PROCEDURE — 99024 POSTOP FOLLOW-UP VISIT: CPT | Performed by: SURGERY

## 2024-09-05 ENCOUNTER — TELEPHONE (OUTPATIENT)
Age: 79
End: 2024-09-05

## 2024-09-05 DIAGNOSIS — N18.6 ESRD (END STAGE RENAL DISEASE) (HCC): Primary | ICD-10-CM

## 2024-09-05 NOTE — TELEPHONE ENCOUNTER
Called the center and spoke with dialysis, they stuck the pt yesterday and pt's hand was cool to touch and had discomfort, advised would speak with provider and see if she wants to have steel study done. Sent message to Dr. Agee to call me.   Spoke with Dr. Agee and she would like steel study to determine blood flow.   Order for WBI placed per verbal order from Dr. Agee.   Called and advised daughter of concerns at dialysis, she agreed with plan.   Gave to Sophia to schedule and call dialysis unit with appt.

## 2024-09-06 ENCOUNTER — TELEPHONE (OUTPATIENT)
Age: 79
End: 2024-09-06

## 2024-09-06 ENCOUNTER — CLINICAL DOCUMENTATION (OUTPATIENT)
Age: 79
End: 2024-09-06

## 2024-09-06 NOTE — TELEPHONE ENCOUNTER
lvm for pt daughter jeffrey @ [phone] pt needs a wbi we have 7:30am available for 09/11. pt also needs to move f/u after study on that day as well

## 2024-09-06 NOTE — PROGRESS NOTES
lvm for pt daughter jeffrey @ [phone] pt needs a wbi we have 7:30am available for 09/11. pt also needs to move f/u to 3:00p on that day as well

## 2024-09-09 ENCOUNTER — CLINICAL DOCUMENTATION (OUTPATIENT)
Age: 79
End: 2024-09-09

## 2024-09-11 ENCOUNTER — OFFICE VISIT (OUTPATIENT)
Age: 79
End: 2024-09-11

## 2024-09-11 VITALS
BODY MASS INDEX: 22 KG/M2 | HEART RATE: 98 BPM | WEIGHT: 166 LBS | OXYGEN SATURATION: 99 % | SYSTOLIC BLOOD PRESSURE: 114 MMHG | DIASTOLIC BLOOD PRESSURE: 62 MMHG | HEIGHT: 73 IN

## 2024-09-11 DIAGNOSIS — T82.898A STEAL SYNDROME AS COMPLICATION OF DIALYSIS ACCESS, INITIAL ENCOUNTER (HCC): Primary | ICD-10-CM

## 2024-09-11 DIAGNOSIS — N18.6 ESRD (END STAGE RENAL DISEASE) (HCC): ICD-10-CM

## 2024-09-11 PROCEDURE — 99024 POSTOP FOLLOW-UP VISIT: CPT | Performed by: SURGERY

## 2024-09-12 ENCOUNTER — CLINICAL DOCUMENTATION (OUTPATIENT)
Age: 79
End: 2024-09-12

## 2024-09-17 ENCOUNTER — CLINICAL DOCUMENTATION (OUTPATIENT)
Age: 79
End: 2024-09-17

## 2024-10-16 ENCOUNTER — TELEPHONE (OUTPATIENT)
Facility: CLINIC | Age: 79
End: 2024-10-16

## 2024-10-16 NOTE — TELEPHONE ENCOUNTER
Patient daughter calling in for referral to ENT for hearing issues please review chart and submit request

## 2024-10-31 ENCOUNTER — TELEPHONE (OUTPATIENT)
Age: 79
End: 2024-10-31

## 2024-11-04 ENCOUNTER — TELEPHONE (OUTPATIENT)
Age: 79
End: 2024-11-04

## 2024-11-04 NOTE — TELEPHONE ENCOUNTER
Called and left message to daughter, Karen Interiano regarding scheduling patient for Catheter exchange tomorrow, 11/05/2024 with Dr. Howard. Patients catheter clotted today and per Dr. Agee to schedule a catheter exchange tomorrow with Dr. Howard. Patient is scheduled at 8am to be seen at the office. Will cancel due to surgery to be scheduled. Waiting for daughter to call back.

## 2024-11-04 NOTE — TELEPHONE ENCOUNTER
Called patients daughter and left message. Need to cancel appointment with Dr. Agee and schedule patient for CVC exchange for dialysis with Dr. Howard. Waiting for patients daughterKaren to call back. Patient scheduled at [facility] at 11:45am with a check in time of 1015am at Cath lab. Patient is on Eliquis, need to hold taking tonight. NPO MN. Patient is scheduled waiting for confirmation from the daughter.

## 2024-11-04 NOTE — TELEPHONE ENCOUNTER
Zeinab Queen called stated that the patients catheter for dialysis is clotted. He only had 3 hours and 30 mins treatment today, Monday, 11/04/2024. He is scheduled to see Dr. Agee at 8am tomorrow, 11/5/24, Tuesday. Sent message to the provider to see what the next step will be.

## 2024-11-05 ENCOUNTER — HOSPITAL ENCOUNTER (OUTPATIENT)
Facility: HOSPITAL | Age: 79
Setting detail: OUTPATIENT SURGERY
Discharge: HOME | End: 2024-11-05
Attending: SURGERY | Admitting: SURGERY
Payer: MEDICARE

## 2024-11-05 ENCOUNTER — TELEPHONE (OUTPATIENT)
Age: 79
End: 2024-11-05

## 2024-11-05 VITALS
OXYGEN SATURATION: 95 % | RESPIRATION RATE: 16 BRPM | HEART RATE: 48 BPM | DIASTOLIC BLOOD PRESSURE: 81 MMHG | SYSTOLIC BLOOD PRESSURE: 113 MMHG

## 2024-11-05 DIAGNOSIS — N18.6 ESRD (END STAGE RENAL DISEASE) (HCC): ICD-10-CM

## 2024-11-05 LAB
ANION GAP BLD CALC-SCNC: ABNORMAL (ref 10–20)
CA-I BLD-MCNC: 1.16 MMOL/L (ref 1.15–1.33)
CHLORIDE BLD-SCNC: 99 MMOL/L (ref 100–111)
CREAT UR-MCNC: 6.9 MG/DL (ref 0.6–1.3)
GLUCOSE BLD STRIP.AUTO-MCNC: 90 MG/DL (ref 74–99)
POTASSIUM BLD-SCNC: 5.5 MMOL/L (ref 3.5–5.5)
SODIUM BLD-SCNC: 137 MMOL/L (ref 136–145)

## 2024-11-05 PROCEDURE — 80047 BASIC METABLC PNL IONIZED CA: CPT

## 2024-11-05 PROCEDURE — 2580000003 HC RX 258: Performed by: SURGERY

## 2024-11-05 PROCEDURE — 36581 REPLACE TUNNELED CV CATH: CPT | Performed by: SURGERY

## 2024-11-05 PROCEDURE — C1750 CATH, HEMODIALYSIS,LONG-TERM: HCPCS | Performed by: SURGERY

## 2024-11-05 PROCEDURE — 2709999900 HC NON-CHARGEABLE SUPPLY: Performed by: SURGERY

## 2024-11-05 PROCEDURE — C1769 GUIDE WIRE: HCPCS | Performed by: SURGERY

## 2024-11-05 PROCEDURE — 6370000000 HC RX 637 (ALT 250 FOR IP): Performed by: SURGERY

## 2024-11-05 PROCEDURE — 99152 MOD SED SAME PHYS/QHP 5/>YRS: CPT | Performed by: SURGERY

## 2024-11-05 PROCEDURE — 6360000002 HC RX W HCPCS: Performed by: SURGERY

## 2024-11-05 RX ORDER — MIDAZOLAM HYDROCHLORIDE 2 MG/ML
SYRUP ORAL PRN
Status: DISCONTINUED | OUTPATIENT
Start: 2024-11-05 | End: 2024-11-05 | Stop reason: HOSPADM

## 2024-11-05 RX ORDER — FENTANYL CITRATE 50 UG/ML
INJECTION, SOLUTION INTRAMUSCULAR; INTRAVENOUS PRN
Status: DISCONTINUED | OUTPATIENT
Start: 2024-11-05 | End: 2024-11-05 | Stop reason: HOSPADM

## 2024-11-05 NOTE — H&P
Aniceto CAMPOS Jaydon Sr  No chief complaint on file.    Presents for exchange of right femoral vein tunneled dialysis catheter  History and Physical      The patient is an 79-year-old male, with end-stage renal disease, on hemodialysis through a right femoral vein tunneled hemodialysis catheter.  He had dialysis through the catheter yesterday for 3 hours, after which the catheter clotted.  Hence he presents for catheter exchange.  He had a left arm AV graft placed on 4/23/2024 and 8/20/2024 by Dr Agee.     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  He also has symptoms of steal on the left upper extremity.    Past Medical History:   Diagnosis Date    Atrial fibrillation ([facility])     DVT (deep venous thrombosis) ([facility])     ESRD (end stage renal disease) ([facility])     Hemodialysis patient (HCC)     [place] (hard of hearing)     Hx of blood clots     Hypertension     TIA (transient ischemic attack)     1994     Patient Active Problem List   Diagnosis    ESRD (end stage renal disease) ([facility])    Hypertension    History of DVT (deep vein thrombosis)    [place] (hard of hearing)    Chronic deep vein thrombosis (DVT) of proximal vein of right lower extremity (HCC)    Thrombocytopenia, unspecified (HCC)    Atrial fibrillation (HCC)    Typical atrial flutter    Chronic anticoagulation    PAD (peripheral artery disease) ([facility])    Shortness of breath    Encounter for evaluation of ability to make decisions regarding care    Moderate dementia ([facility])    Debility    ESRD (end stage renal disease) on dialysis ([facility])    Encounter for palliative care    Goals of care, counseling/discussion    Occlusion of arteriovenous dialysis graft (HCC)    End stage renal disease (HCC)    Arteriovenous fistula occlusion (HCC)     Past Surgical History:   Procedure Laterality Date    CARDIAC PROCEDURE N/A 6/13/2024    Intravascular ultrasound performed by Alejandro Agee MD at [facility] CARDIAC CATH LAB

## 2024-11-05 NOTE — TELEPHONE ENCOUNTER
Called and spoke to daughterKaren this morning at 6:45am to let her know not to bring her dad to the office, instead to go to [facility], Cath Lab for CVC exchange with Dr. Howard due to patients catheter is clotted per dialysis. He was not able to complete dialysis yesterday, 11/04/2024.     Patient instructions:   Check in at [facility] Cath Lab at 9:15am.  Do not eat, drink or chew gum after midnight prior to surgery.   Only take heart and blood pressure medication with a sip of water the morning of the procedure.   Took last dose of Eliquis on 11/04/2024 PM. Per Dr. Howard to hold today.   Patient instructed to have RIDE available when discharged from hospital. Patient is not allowed to drive, walk or go home using public transportation (including Lyft and Uber).   Will call daughter to reschedule the follow up appointment with Dr. Agee.   Patient confirmed and repeated instructions.

## 2024-11-05 NOTE — PROGRESS NOTES
Cath holding summary    Patient escorted to cath holding from waiting area ambulatory, alert and oriented x 4, voicing no complaints.  Changed into gown and placed on monitor.  NPO since MN.  Lab results, med rec and H&P reviewed on chart.  PIV x 1 inserted without difficulty.  Family to bedside.

## 2024-11-05 NOTE — PRE SEDATION
Sedation Plan  ASA: class 4 - patient with severe systemic disease that is a constant threat to life     Mallampati class: IV - only hard palate visible.    Sedation plan: local anesthesia and moderate (conscious sedation)    Risks, benefits, and alternatives discussed with patient.  Use of blood products discussed with patient who consented to blood products.       Immediate reassessment prior to sedation:  Patient's status reviewed and vital signs assessed; acceptable to perform procedure and proceed to administer sedation as planned.

## 2024-11-05 NOTE — OP NOTE
Operative Note      Patient: Aniceto Interiano Sr  YOB: 1945  MRN: [id number]    Date of Procedure: 11/5/2024    Pre-Op Diagnosis Codes:      * ESRD (end stage renal disease) ([facility]) [N18.6], malfunctioning right femoral vein tunneled hemodialysis catheter    Post-Op Diagnosis: Same       Procedure:  1.  Inferior venacavogram through right femoral vein tunneled hemodialysis catheter  2.  Exchange of right femoral vein tunneled hemodialysis catheter to 33 cm Palindrome catheter over wire,  under fluoroscopic guidance    Surgeon(s):  Deidre Howard MD      Anesthesia: IV Sedation: Versed-0.5 mg, fentanyl-50 mcg, local anesthesia  Conscious sedation time: 9 minutes  Contrast: 10 mL Visipaque    Estimated Blood Loss (mL): Minimal    Complications: None    Angiographic findings:  Patent inferior vena cava.    Detailed Description of Procedure:   Indication for the procedure: The patient is an 79-year-old gentleman, with end-stage renal disease, on hemodialysis through a right femoral vein tunneled hemodialysis catheter which was noted to be clotted after 3 hours of hemodialysis treatment yesterday.  He took Eliquis yesterday.    Informed consent was obtained from the patient for the procedure.    The patient was placed supine on the angiographic table.  The right groin including the catheter were cleaned and draped in a sterile fashion.  He received 2 g Ancef intravenously as preoperative antibiotic prophylaxis.  Timeout was performed.  Intravenous sedation was administered under my direct supervision.  A Glidewire was placed through one of the ports of the catheter and advanced into the inferior vena cava.  The catheter cuff was close to the exit site, and the catheter could be retrieved easily, till the catheter tip was at the pelvic brim.  Inferior venacavogram was performed through the other port of the catheter.  The vena cava and the right side iliac veins were noted to be patent.  The catheter was

## 2024-11-05 NOTE — PROGRESS NOTES
AVS Discharge instructions reviewed with patient and copy given to patient.  All questions answered, including when to resume medications.  Patient verbalized understanding to all discharge instructions.  PIV removed. Procedural site within normal limits.  No hematoma or bleeding noted from procedural and PIV site. No pain noted at discharge. Patient back to neurological baseline, alert and oriented times 4. Patient discharged in the presence of a responsible adult (daughter ) who will accompany patient home and is able to report post procedure complications.

## 2024-11-06 RX ORDER — MIDAZOLAM HYDROCHLORIDE 1 MG/ML
INJECTION, SOLUTION INTRAMUSCULAR; INTRAVENOUS PRN
Status: DISCONTINUED | OUTPATIENT
Start: 2024-11-05 | End: 2024-11-06 | Stop reason: HOSPADM

## 2024-11-21 ENCOUNTER — TELEPHONE (OUTPATIENT)
Age: 79
End: 2024-11-21

## 2024-11-21 NOTE — TELEPHONE ENCOUNTER
Zeinab Queen called stated that patients catheter for dialysis is not working, as the machine alarms. Confirmed that patient has full treatment on Monday, 11/18/24 and 11/20/24, Wednesday but not meeting clearance and treatment was longer.     Spoke to Dr. Agee to schedule catheter exchange. Called daughterKaren and left message at 9:25am to schedule. Waiting for daughter to call back.

## 2024-11-22 ENCOUNTER — TELEPHONE (OUTPATIENT)
Facility: CLINIC | Age: 79
End: 2024-11-22

## 2024-11-22 NOTE — TELEPHONE ENCOUNTER
Received a call from NANCY Zapata requesting information about the pt's care and diagnosis and treatment. Staff inquires if the pt has a history of dementia and what types of treatment he is receiving, if he has been keeping his office appointments and overall care. Staff advised to fax the requested information for review. Staff is informed the PCP is currently out of the office until 12/03/24.

## 2024-11-26 ENCOUNTER — OFFICE VISIT (OUTPATIENT)
Age: 79
End: 2024-11-26
Payer: MEDICARE

## 2024-11-26 DIAGNOSIS — T82.898A STEAL SYNDROME AS COMPLICATION OF DIALYSIS ACCESS, INITIAL ENCOUNTER (HCC): Primary | ICD-10-CM

## 2024-11-26 DIAGNOSIS — N18.6 ESRD (END STAGE RENAL DISEASE) (HCC): ICD-10-CM

## 2024-11-26 PROCEDURE — 99213 OFFICE O/P EST LOW 20 MIN: CPT | Performed by: SURGERY

## 2024-11-26 PROCEDURE — 1123F ACP DISCUSS/DSCN MKR DOCD: CPT | Performed by: SURGERY

## 2024-11-26 NOTE — H&P (VIEW-ONLY)
[facility]    Vascular Surgery Phone Call Appointment    Aniceto Interiano   Chief Complaint   Patient presents with    Follow-up       History:  80 y/o M with ESRD undergoing HD via a TDC. He recently had a second LUE brachial axillary AVG placed. Shortly thereafter, he developed L hand pain and imaging suggested significant steal from the graft. Prior to making a decision regarding whether or not the graft should be banded/ligated, the graft thrombosed. He has continued to undergo HD via his TDC.     I called and spoke with his daughter today. No other changes to his history. He continues dialysis via a TDC in the groin.     PE:   No physical exam was performed for this phone call appointment.     Imaging/Studies:   Oct 2024  LUE arterial duplex: No axillary, subclavian, or brachial artery stenoses. Flow reversal from the radial and ulnar arteries visualized.       Impression:  ESRD undergoing HD via a groin TDC.   LUE brach ax thrombosed graft x 2. He developed steal syndrome with his most recent graft prior to thrombosis of the graft.     Plan:  I do not believe another graft should be attempted. He should continue HD via TDCs.   I discussed this at length with his daughter. We also discussed the fact that sometimes his dialysis center wants to send him to the ER when his catheter malfunctions. I advised her to contact our office first. We will try to recommend troubleshooting the catheter prior to exchange and if a procedure is needed, coordinate with her in order to try and avoid an ER visit.       Alejandro Agee MD  Vascular Surgeon      This telephone appointment was performed at the request of the patient. Prior to the appointment, the patient was informed that a telephone appointment is a billable service, and that he/she might receive an insurance bill. She consented to proceed with the visit.  I spent approximately 25 minutes on this patient encounter, which

## 2024-11-26 NOTE — PROGRESS NOTES
[facility] Progress Note      Patient Name: Taryn Valladares  : 1957  MRN: [id number]  Primary Care Physician:  Ciro Pineda [id number]  [facility]    Vascular Surgery Phone Call Appointment    Aniceto Interiano   Chief Complaint   Patient presents with    Follow-up       History:  80 y/o M with ESRD undergoing HD via a TDC. He recently had a second LUE brachial axillary AVG placed. Shortly thereafter, he developed L hand pain and imaging suggested significant steal from the graft. Prior to making a decision regarding whether or not the graft should be banded/ligated, the graft thrombosed. He has continued to undergo HD via his TDC.     I called and spoke with his daughter today. No other changes to his history. He continues dialysis via a TDC in the groin.     PE:   No physical exam was performed for this phone call appointment.     Imaging/Studies:   Oct 2024  LUE arterial duplex: No axillary, subclavian, or brachial artery stenoses. Flow reversal from the radial and ulnar arteries visualized.       Impression:  ESRD undergoing HD via a groin TDC.   LUE brach ax thrombosed graft x 2. He developed steal syndrome with his most recent graft prior to thrombosis of the graft.     Plan:  I do not believe another graft should be attempted. He should continue HD via TDCs.   I discussed this at length with his daughter. We also discussed the fact that sometimes his dialysis center wants to send him to the ER when his catheter malfunctions. I advised her to contact our office first. We will try to recommend troubleshooting the catheter prior to exchange and if a procedure is needed, coordinate with her in order to try and avoid an ER visit.       Alejandro Agee MD  Vascular Surgeon      This telephone appointment was performed at the request of the patient. Prior to the appointment, the patient was informed that a telephone appointment is a billable service, and that he/she might receive an insurance bill. She consented to proceed with the visit.  I spent approximately 25 minutes on this patient encounter, which  [facility]    [facility]  [phone]    Please note that portions of this note were completed with a voice recognition program.

## 2024-12-09 ENCOUNTER — TELEPHONE (OUTPATIENT)
Age: 79
End: 2024-12-09

## 2024-12-09 NOTE — TELEPHONE ENCOUNTER
Karen Interiano, daughter called and stated that patient only had 1 hr treatment last Friday, 12/6/2024 due to Catheter on his groin kept alarming. Called the facility and spoke to the charge nurse and stated that the machine kept alarming that they had to stop and restart and after an hour had to stop the treatment completed as the machine was showing the blood was starting to clot. Patient was only running at 150-200 blood flow. They will try again today. Spoke to Dr. Agee and Dr. Howard and recommending to exchange the catheter on Tuesday, 12/10/24 at [facility].   Called the daughter again and left message to schedule. Need to hold Eliquis today. Waiting for the daughter to call back.     Scheduling Groin Catheter Exchange on 12/10/2024 at 10:30am with a check in time of 9:15am at [facility], [facility], Cath lab.

## 2024-12-11 ENCOUNTER — TELEPHONE (OUTPATIENT)
Age: 79
End: 2024-12-11

## 2024-12-11 ENCOUNTER — TELEPHONE (OUTPATIENT)
Facility: CLINIC | Age: 79
End: 2024-12-11

## 2024-12-11 ENCOUNTER — HOSPITAL ENCOUNTER (OUTPATIENT)
Facility: HOSPITAL | Age: 79
Setting detail: OBSERVATION
Discharge: HOME | End: 2024-12-11
Attending: SURGERY | Admitting: SURGERY
Payer: MEDICARE

## 2024-12-11 VITALS
RESPIRATION RATE: 17 BRPM | TEMPERATURE: 97.9 F | BODY MASS INDEX: 22 KG/M2 | HEIGHT: 73 IN | WEIGHT: 166 LBS | DIASTOLIC BLOOD PRESSURE: 63 MMHG | OXYGEN SATURATION: 98 % | SYSTOLIC BLOOD PRESSURE: 108 MMHG | HEART RATE: 61 BPM

## 2024-12-11 DIAGNOSIS — T82.898D ARTERIOVENOUS FISTULA OCCLUSION, SUBSEQUENT ENCOUNTER: ICD-10-CM

## 2024-12-11 LAB
ANION GAP BLD CALC-SCNC: ABNORMAL (ref 10–20)
ANION GAP SERPL CALC-SCNC: 11 MMOL/L (ref 3–18)
ANION GAP SERPL CALC-SCNC: 9 MMOL/L (ref 3–18)
BASOPHILS # BLD: 0 K/UL (ref 0–0.1)
BASOPHILS NFR BLD: 0 % (ref 0–2)
BUN SERPL-MCNC: 111 MG/DL (ref 7–18)
BUN SERPL-MCNC: 97 MG/DL (ref 7–18)
BUN/CREAT SERPL: 6 (ref 12–20)
BUN/CREAT SERPL: 6 (ref 12–20)
CA-I BLD-MCNC: 1.21 MMOL/L (ref 1.15–1.33)
CALCIUM SERPL-MCNC: 10.4 MG/DL (ref 8.5–10.1)
CALCIUM SERPL-MCNC: 9.5 MG/DL (ref 8.5–10.1)
CHLORIDE BLD-SCNC: 110 MMOL/L (ref 100–111)
CHLORIDE SERPL-SCNC: 103 MMOL/L (ref 100–111)
CHLORIDE SERPL-SCNC: 108 MMOL/L (ref 100–111)
CO2 SERPL-SCNC: 21 MMOL/L (ref 21–32)
CO2 SERPL-SCNC: 21 MMOL/L (ref 21–32)
CREAT SERPL-MCNC: 16.4 MG/DL (ref 0.6–1.3)
CREAT SERPL-MCNC: 17.6 MG/DL (ref 0.6–1.3)
CREAT UR-MCNC: 11.4 MG/DL (ref 0.6–1.3)
DIFFERENTIAL METHOD BLD: ABNORMAL
ECHO BSA: 1.97 M2
EOSINOPHIL # BLD: 0.1 K/UL (ref 0–0.4)
EOSINOPHIL NFR BLD: 1 % (ref 0–5)
ERYTHROCYTE [DISTWIDTH] IN BLOOD BY AUTOMATED COUNT: 15.5 % (ref 11.6–14.5)
GLUCOSE BLD STRIP.AUTO-MCNC: 107 MG/DL (ref 70–110)
GLUCOSE BLD STRIP.AUTO-MCNC: 68 MG/DL (ref 70–110)
GLUCOSE BLD STRIP.AUTO-MCNC: 74 MG/DL (ref 70–110)
GLUCOSE BLD STRIP.AUTO-MCNC: 98 MG/DL (ref 74–99)
GLUCOSE SERPL-MCNC: 105 MG/DL (ref 74–99)
GLUCOSE SERPL-MCNC: 89 MG/DL (ref 74–99)
HCT VFR BLD AUTO: 36 % (ref 36–48)
HGB BLD-MCNC: 11.1 G/DL (ref 13–16)
IMM GRANULOCYTES # BLD AUTO: 0 K/UL (ref 0–0.04)
IMM GRANULOCYTES NFR BLD AUTO: 0 % (ref 0–0.5)
INR PPP: 1.1 (ref 0.9–1.1)
LYMPHOCYTES # BLD: 1.5 K/UL (ref 0.9–3.6)
LYMPHOCYTES NFR BLD: 18 % (ref 21–52)
MCH RBC QN AUTO: 29.7 PG (ref 24–34)
MCHC RBC AUTO-ENTMCNC: 30.8 G/DL (ref 31–37)
MCV RBC AUTO: 96.3 FL (ref 78–100)
MONOCYTES # BLD: 0.5 K/UL (ref 0.05–1.2)
MONOCYTES NFR BLD: 6 % (ref 3–10)
NEUTS SEG # BLD: 6.4 K/UL (ref 1.8–8)
NEUTS SEG NFR BLD: 75 % (ref 40–73)
NRBC # BLD: 0 K/UL (ref 0–0.01)
NRBC BLD-RTO: 0 PER 100 WBC
PLATELET # BLD AUTO: ABNORMAL K/UL (ref 135–420)
PLATELET COMMENT: ABNORMAL
PMV BLD AUTO: 12.6 FL (ref 9.2–11.8)
POTASSIUM BLD-SCNC: 5.6 MMOL/L (ref 3.5–5.5)
POTASSIUM SERPL-SCNC: 5.5 MMOL/L (ref 3.5–5.5)
POTASSIUM SERPL-SCNC: 6.8 MMOL/L (ref 3.5–5.5)
PROTHROMBIN TIME: 14.6 SEC (ref 11.9–14.9)
RBC # BLD AUTO: 3.74 M/UL (ref 4.35–5.65)
RBC MORPH BLD: ABNORMAL
SODIUM BLD-SCNC: 139 MMOL/L (ref 136–145)
SODIUM SERPL-SCNC: 133 MMOL/L (ref 136–145)
SODIUM SERPL-SCNC: 140 MMOL/L (ref 136–145)
WBC # BLD AUTO: 8.5 K/UL (ref 4.6–13.2)

## 2024-12-11 PROCEDURE — 6360000002 HC RX W HCPCS: Performed by: SURGERY

## 2024-12-11 PROCEDURE — 80048 BASIC METABOLIC PNL TOTAL CA: CPT

## 2024-12-11 PROCEDURE — 2709999900 HC NON-CHARGEABLE SUPPLY: Performed by: SURGERY

## 2024-12-11 PROCEDURE — 36902 INTRO CATH DIALYSIS CIRCUIT: CPT | Performed by: SURGERY

## 2024-12-11 PROCEDURE — 82962 GLUCOSE BLOOD TEST: CPT

## 2024-12-11 PROCEDURE — C1894 INTRO/SHEATH, NON-LASER: HCPCS | Performed by: SURGERY

## 2024-12-11 PROCEDURE — 2580000003 HC RX 258: Performed by: SURGERY

## 2024-12-11 PROCEDURE — 36581 REPLACE TUNNELED CV CATH: CPT | Performed by: SURGERY

## 2024-12-11 PROCEDURE — C1887 CATHETER, GUIDING: HCPCS | Performed by: SURGERY

## 2024-12-11 PROCEDURE — C1769 GUIDE WIRE: HCPCS | Performed by: SURGERY

## 2024-12-11 PROCEDURE — 6370000000 HC RX 637 (ALT 250 FOR IP): Performed by: SURGERY

## 2024-12-11 PROCEDURE — 6360000004 HC RX CONTRAST MEDICATION: Performed by: SURGERY

## 2024-12-11 PROCEDURE — C1750 CATH, HEMODIALYSIS,LONG-TERM: HCPCS | Performed by: SURGERY

## 2024-12-11 PROCEDURE — 85610 PROTHROMBIN TIME: CPT

## 2024-12-11 PROCEDURE — 76000 FLUOROSCOPY <1 HR PHYS/QHP: CPT | Performed by: SURGERY

## 2024-12-11 PROCEDURE — C1725 CATH, TRANSLUMIN NON-LASER: HCPCS | Performed by: SURGERY

## 2024-12-11 PROCEDURE — G0378 HOSPITAL OBSERVATION PER HR: HCPCS

## 2024-12-11 PROCEDURE — 2500000003 HC RX 250 WO HCPCS: Performed by: SURGERY

## 2024-12-11 PROCEDURE — 7100000011 HC PHASE II RECOVERY - ADDTL 15 MIN: Performed by: SURGERY

## 2024-12-11 PROCEDURE — 7100000010 HC PHASE II RECOVERY - FIRST 15 MIN: Performed by: SURGERY

## 2024-12-11 PROCEDURE — 80047 BASIC METABLC PNL IONIZED CA: CPT

## 2024-12-11 PROCEDURE — 85025 COMPLETE CBC W/AUTO DIFF WBC: CPT

## 2024-12-11 RX ORDER — DEXTROSE MONOHYDRATE 100 MG/ML
INJECTION, SOLUTION INTRAVENOUS CONTINUOUS PRN
Status: DISCONTINUED | OUTPATIENT
Start: 2024-12-11 | End: 2024-12-11 | Stop reason: HOSPADM

## 2024-12-11 RX ORDER — HEPARIN SODIUM 1000 [USP'U]/ML
INJECTION, SOLUTION INTRAVENOUS; SUBCUTANEOUS PRN
Status: DISCONTINUED | OUTPATIENT
Start: 2024-12-11 | End: 2024-12-11 | Stop reason: HOSPADM

## 2024-12-11 RX ORDER — IODIXANOL 320 MG/ML
INJECTION, SOLUTION INTRAVASCULAR PRN
Status: DISCONTINUED | OUTPATIENT
Start: 2024-12-11 | End: 2024-12-11 | Stop reason: HOSPADM

## 2024-12-11 RX ORDER — CALCIUM GLUCONATE 94 MG/ML
1000 INJECTION, SOLUTION INTRAVENOUS ONCE
Status: COMPLETED | OUTPATIENT
Start: 2024-12-11 | End: 2024-12-11

## 2024-12-11 RX ORDER — CALCIUM GLUCONATE 20 MG/ML
1000 INJECTION, SOLUTION INTRAVENOUS ONCE
Status: DISCONTINUED | OUTPATIENT
Start: 2024-12-11 | End: 2024-12-11

## 2024-12-11 RX ORDER — SODIUM CHLORIDE 9 MG/ML
INJECTION, SOLUTION INTRAVENOUS PRN
Status: DISCONTINUED | OUTPATIENT
Start: 2024-12-11 | End: 2024-12-11 | Stop reason: HOSPADM

## 2024-12-11 RX ADMIN — CALCIUM GLUCONATE 1000 MG: 98 INJECTION, SOLUTION INTRAVENOUS at 09:59

## 2024-12-11 RX ADMIN — INSULIN HUMAN 10 UNITS: 100 INJECTION, SOLUTION PARENTERAL at 10:10

## 2024-12-11 RX ADMIN — DEXTROSE MONOHYDRATE 250 ML: 100 INJECTION, SOLUTION INTRAVENOUS at 10:07

## 2024-12-11 RX ADMIN — DEXTROSE MONOHYDRATE 125 ML: 100 INJECTION, SOLUTION INTRAVENOUS at 13:26

## 2024-12-11 RX ADMIN — SODIUM BICARBONATE 50 MEQ: 84 INJECTION INTRAVENOUS at 10:04

## 2024-12-11 ASSESSMENT — PAIN DESCRIPTION - LOCATION
LOCATION: FOOT
LOCATION: FOOT

## 2024-12-11 ASSESSMENT — PAIN SCALES - GENERAL: PAINLEVEL_OUTOF10: 7

## 2024-12-11 NOTE — TELEPHONE ENCOUNTER
Called [facility] and spoke to the charge nurse, Janiya to check if patient can still get dialysis treatment today, 12/11/24 after  CVC exchange at [facility]. Charge nurse stated that they are full.     They are able to get him chairtime tomorrow, 12/12/2024 at 11:30am. ORI Spaulding from Cath lab spoke to the daughter, Karen and confirmed patient will make it tomorrow.

## 2024-12-11 NOTE — INTERVAL H&P NOTE
Update History & Physical    The patient's History and Physical of November 26, 2024 was reviewed with the patient and I examined the patient.    Impression:  Malfunctioning tunneled hemodialysis catheter, hyperkalemia  Treatment of hyperkalemia given-repeat potassium 5.6.    Plan:   Venogram, tunneled hemodialysis catheter exchange and additional procedures as required.    The risks, benefits, expected outcome, and alternative to the recommended procedure have been discussed with the patient and his daughter at the bedside. Patient understands and wants to proceed with the procedure.     Electronically signed by Deidre Howard MD on 12/11/2024 at 10:59 AM

## 2024-12-11 NOTE — DISCHARGE INSTRUCTIONS
DISCHARGE SUMMARY from Nurse    PATIENT INSTRUCTIONS:    Report the following to your surgeon:  Excessive pain, swelling, redness or odor of or around the surgical area  Temperature over 100.5  Nausea and vomiting lasting longer than 4 hours or if unable to take medications  Any signs of decreased circulation or nerve impairment to extremity: change in color, persistent  numbness, tingling, coldness or increase pain  Any questions    What to do at Home:  Recommended activity: activity as tolerated and no driving for today.    If you experience any of the following symptoms Dizziness, shortness of breath at rest, unrelieved pain, please follow up with Vein and Vascular.    *  Please give a list of your current medications to your Primary Care Provider.    *  Please update this list whenever your medications are discontinued, doses are      changed, or new medications (including over-the-counter products) are added.    *  Please carry medication information at all times in case of emergency situations.    These are general instructions for a healthy lifestyle:    No smoking/ No tobacco products/ Avoid exposure to second hand smoke  Surgeon General's Warning:  Quitting smoking now greatly reduces serious risk to your health.    Obesity, smoking, and sedentary lifestyle greatly increases your risk for illness    A healthy diet, regular physical exercise & weight monitoring are important for maintaining a healthy lifestyle    You may be retaining fluid if you have a history of heart failure or if you experience any of the following symptoms:  Weight gain of 3 pounds or more overnight or 5 pounds in a week, increased swelling in our hands or feet or shortness of breath while lying flat in bed.  Please call your doctor as soon as you notice any of these symptoms; do not wait until your next office visit.        The discharge information has been reviewed with the patient and caregiver.  The patient and caregiver

## 2024-12-11 NOTE — OP NOTE
Operative Note      Patient: Aniceto Interiano Sr  YOB: 1945  MRN: [id number]    Date of Procedure: 12/11/2024    Preoperative diagnosis: End-stage renal disease, malfunctioning right femoral vein tunneled hemodialysis catheter    Post-Op Diagnosis: End-stage renal disease, malfunctioning right femoral vein tunneled hemodialysis catheter.  IVC stenosis       Procedures:  1.  Inferior venacavogram, balloon angioplasty of the inferior vena cava and the right iliac veins using 16 x 60 mm Tresckow balloon  2.  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  Moderate 50% stenosis of the right common iliac and external iliac veins.    Detailed Description of Procedure:   The patient is an 79-year-old gentleman, with end-stage renal disease, on hemodialysis through a right femoral vein tunneled hemodialysis catheter, that was changed multiple times in the past.  The last catheter exchange was on 11/5/2024.  His last hemodialysis treatment was almost a week ago.  Serum potassium today was a 6.8, which was corrected with bicarb, calcium and dextrose insulin after which it improved to 5.6.  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    The patient was identified and placed supine on the angiographic table.  The

## 2024-12-11 NOTE — PRE SEDATION
Sedation Plan  ASA: class 4 - patient with severe systemic disease that is a constant threat to life     Mallampati class: III - soft palate, base of uvula visible.    Sedation plan: level 2-1: moderate/analgesia (conscious sedation)    Risks, benefits, and alternatives discussed with patient.  Use of blood products discussed with patient who consented to blood products.       Immediate reassessment prior to sedation:  Patient's status reviewed and vital signs assessed; acceptable to perform procedure and proceed to administer sedation as planned.

## 2024-12-11 NOTE — PROGRESS NOTES
Cath holding summary:    0820: Patient ambulated from waiting area without difficulty, placed on monitor AFIB. A&O, no c/o. ID, NPO status, allergies verified. H&P reviewed, med rec completed. PIV x1 inserted, blood sent to lab. Groin prep completed, consent ready for signature.    1108: Verbal report given to Mary Lou Interaino Sr being transferred to lab2 for ordered procedure. Report consisted of patient's Situation, Background, Assessment and Recommendations (SBAR). Information from the following report(s) Nurse Handoff Report, Intake/Output, MAR, Recent Results, Med Rec Status, Cardiac Rhythm AFIB, Pre Procedure Checklist, and Procedure Verification was reviewed with the receiving nurse. Opportunity for questions and clarification was provided.    1211: Verbal report received from Mo on Aniceto Interiano Sr received from lab2 for routine post-op. Report consisted of patient's Situation, Background, Assessment and Recommendations (SBAR). Information from the following report(s) Nurse Handoff Report, Surgery Report, Intake/Output, MAR, Recent Results, Med Rec Status, Cardiac Rhythm AFIB, and Event Log was reviewed with the receiving nurse. Opportunity for questions and clarification was provided. Assessment completed upon patient's arrival to unit and care assumed.   Procedure: Tunneled Dialysis Catheter  Intervention: Yes  Site: Right, Groin    1315 BG taken before discharge, result 68. IV replacement given, see MAR for details. BG to be rechecked 15 minutes after replacement.    1345 . Patient at baseline, family at bedside.    1400: AVS Discharge instructions reviewed with patient, all questions answered. Patient verbalized understanding, copy given. Procedural site within normal limits, PIV removed. No hematoma or bleeding noted from procedural or IV site. Patient denied complaints, discharged with support person in stable condition. Escorted out to vehicle for transport home.

## 2024-12-11 NOTE — TELEPHONE ENCOUNTER
Left message on Jodi Clark voicemail to please contact us at [phone]  Jodi number is [phone]

## 2024-12-12 ENCOUNTER — CARE COORDINATION (OUTPATIENT)
Facility: CLINIC | Age: 79
End: 2024-12-12

## 2024-12-12 NOTE — CARE COORDINATION
Care Transitions Note    Initial Call - Call within 2 business days of discharge: Yes    Attempted to reach patient for transitions of care follow up. Unable to reach patient.    Outreach Attempts:   HIPAA compliant voicemail left for patient.     Patient: Aniceto Interiano Sr    Patient : 1945   MRN: [id number]    Reason for Admission: ESRD (end stage renal disease)   Discharge Date: 24  RURS: Readmission Risk Score: 7.3    Last Discharge Facility       Date Complaint Diagnosis Description Type Department Provider    24  Arteriovenous fistula occlusion, [facility]            Was this an external facility discharge? No      Future Appointments         Provider Specialty Dept Phone    2025 9:30 AM Neeraj Martinez MD Cardiology [phone]            Plan for follow-up on next business day.      Radha Panchal RN

## 2024-12-13 ENCOUNTER — TELEPHONE (OUTPATIENT)
Facility: CLINIC | Age: 79
End: 2024-12-13

## 2024-12-13 ENCOUNTER — CARE COORDINATION (OUTPATIENT)
Facility: CLINIC | Age: 79
End: 2024-12-13

## 2024-12-13 DIAGNOSIS — H91.90 HEARING LOSS, UNSPECIFIED HEARING LOSS TYPE, UNSPECIFIED LATERALITY: Primary | ICD-10-CM

## 2024-12-13 NOTE — TELEPHONE ENCOUNTER
Received a call form [facility] CM Team to report  the pt 's  daughter is inquiring about the status for the referral to Audiology. Additionally, that the pt's daughter believes the pt  has been verbally declining assistance for  home health, Medicaid application. Staff states encouraging the pt/pt's daughter to follow up with Vascular for s/p catheter  exchange. No appointment scheduled at this time. PCP/ referral coodinators notified.

## 2024-12-13 NOTE — TELEPHONE ENCOUNTER
Call came in Jodi Clark at [facility] in regards to the status of the patient's current living conditions.  Jodi states a call was placed out from the patient as he felt like he was being forced to move into a nursing home and was being denied care and not given his medications.  APS went to the home to assess and solve that medication had been dispensed however not particularly put directly in patient's hand to the intake.  APS is asking for additional information such as notes with the patient's diagnosis and status of such.  I advised we will attempt to send paperwork over to her at the fax number that was provided.

## 2024-12-13 NOTE — CARE COORDINATION
Care Transitions Note    Initial Call - Call within 2 business days of discharge: Yes    Patient Current Location:  Home: [address] [place] [address] [address] Megha [address]  [facility] [id number]    Care Transition Nurse contacted the patient by telephone to perform post hospital discharge assessment, verified name and  as identifiers. Provided introduction to self, and explanation of the Care Transition Nurse role.     Patient: Aniceto Interiano Sr    Patient : 1945   MRN: [id number]    Reason for Admission: ESRD (end stage renal disease)   Discharge Date: 24  RURS: Readmission Risk Score: 7.3      Last Discharge Facility       Date Complaint Diagnosis Description Type Department Provider    24  Arteriovenous fistula occlusion, [facility]            Was this an external facility discharge? No    Additional needs identified to be addressed with provider       Pt. Following up on referral to ENT and Pt. Daughter would like Dr. Dumont to be aware that Pt. Was recently admitted for Exchange of right femoral vein tunneled hemodialysis catheter          Method of communication with provider: phone call.     Care Summary Note:      CTN attempt to reach Patient on phone for follow up. CTN reached Pt. Daughter Ms. Karen Interiano on phone.   Pt. Daughter reports that Pt. Is currently at Dialysis center.   CTN reminded for Pt. To follow up with Vascular. Pt. Daughter reported that the staff at Vascular  Normally calls her to schedule Pt. Appt.   Pt. Daughter reports that she doesn't get off until 7:30PM.   Pt. States that CTN can talk to her while she is at lunch.   Pt. Daughter reports that Pt. Resides with her in apt.   Pt. Daughter reports that Pt. Granddaughter stays with Pt. While she  is at work.   Pt. Daughter reports that Pt. Can barely move now as Patient declined to work with home health staff.   Pt. Daughter also informed CTN that pt. Declined to sign

## 2024-12-19 ENCOUNTER — CARE COORDINATION (OUTPATIENT)
Facility: CLINIC | Age: 79
End: 2024-12-19

## 2024-12-19 NOTE — CARE COORDINATION
Care Transitions Note    Follow Up Call     Attempted to reach patient for transitions of care follow up and to verify if Pt needs ENT or audiology for hearing problem .  Unable to reach patient.      Outreach Attempts:   HIPAA compliant voicemail left for patient.       Follow Up Appointment:   Future Appointments         Provider Specialty Dept Phone    2/6/2025 9:30 AM Neeraj Martinez MD Cardiology [phone]            Plan for follow-up call in 6-10 days based on severity of symptoms and risk factors. Plan for next call:  verify if Pt.  Needs ENT/ Audiology, assess for any needs    Radha Panchal RN

## 2024-12-23 ENCOUNTER — TELEPHONE (OUTPATIENT)
Age: 79
End: 2024-12-23

## 2024-12-23 NOTE — TELEPHONE ENCOUNTER
Contacted daughterKaren on 12/23/2024 at 12:26PM about surgery scheduled on 12/24/2024 with Dr. Howard for Right Groin Catheter Exchange for Dialysis.      Patient instructions:   Check in at [facility] Cath Lab at 9:30am.  Do not eat, drink or chew gum after midnight prior to surgery.   Only take heart and blood pressure medication with a sip of water the morning of the procedure.   Last dose of Eliquis to be taken this morning.   Patient instructed to have RIDE available when discharged from hospital. Patient is not allowed to drive, walk or go home using public transportation (including [employer] and Uber).   Patient confirmed and repeated instructions.      Daughter sent message and stated that his dad has not been taking his medications for 2 days and stated that he was hiding his medication in a napkin. She found them last Saturday while she was taking out the trash. She stated that she can't keep scheduling this if this is happening.

## 2024-12-23 NOTE — TELEPHONE ENCOUNTER
Called daughter Karen to schedule patient for Right Groin CVC exchange on 12/24/24 at 10:45am with a check in time of 12/24/24. Waiting for daughter to call back.   Hold Eliquis today if surgery will be tomorrow at [facility].

## 2024-12-23 NOTE — TELEPHONE ENCOUNTER
Physical Therapy Treatment      Patient Name: Beverly Lucas  MRN: [id number]  Today's Date: 5/7/2024  Visit #15/20  Time Calculation  Start Time: 0915  Stop Time: 1000  Time Calculation (min): 45 min        Assessment:  Beverly was able to tolerate progression of light strength training today. Difficulty engaging scapular retraction in prone position w/ increased posterior shoulder discomfort. Improvements in both observed during seated/standing positions. Good form demonstrated on all other activities today.     Patient's response to session: Decreased pain    Patient is likely to benefit from skilled interventions to address their impairments, and treatment that includes: manual techniques to decrease pain and improve joint/soft-tissue mobility, as well as exercises to increase strength, endurance, and neuromotor control.     Plan:  Continue per POC.    Current Problem:   1. Impingement syndrome of right shoulder        2. Traumatic complete tear of right rotator cuff, subsequent encounter  Follow Up In Physical Therapy      3. Chronic right shoulder pain            Subjective   Beverly notes improvements in her familiar pain. Met w/ Dr. Condon last week - stating that she may now transition to phase 3 of her rehabilitation process.     Objective       Treatments:  PT Therapeutic Procedures Time Entry  Manual Therapy Time Entry: 15  Therapeutic Exercise Time Entry: 30    Therapeutic Exercise (71703):  HEP review  [facility].Deepclass  Access Code: [email or web address]  Prepared by: Dexter Manlet  Wall slides DA -> SA  Reactive IR iso w/ yellow TB  Reactive ER iso w/ yellow TB  Standing rows w/ yellow TB  Reviewed past AA exercises to continue ROM progress    Therapeutic exercise (63269):    UBE: 4' 60s FWD/BKWD  Pulleys  Finger ladder - eccentric wall slides (30)  ISO ER w/ yellow TB  ISO IR w/ yellow TB  Active extension w/ yellow TB  ISO ABD w/ yellow TB  Standing rows w/ red TB  Seated scaption w/ wand  Perturbations  [facility] called stated that patients Right groin Catheter for dialysis is clotted. Only run at 250 BFR on 12/20/2024. Patient goes to dialysis MWF.     Informed Dr. Agee and waiting for instructions.    at 90 flexion       Manual Therapy (89811):  GHJ Posterior glide: Grade II-III: Neutral and 45 ABD  GHJ Inferior glide: Grade II-III: Neutral and 45 ABD  STM: Subscapularis pin and stretch, biceps, thoracic paraspinals    Education and discussion on HEP and treatment regarding the benefits related to current condition, POC, pathophysiology, and precautions    *added to HEP

## 2024-12-23 NOTE — TELEPHONE ENCOUNTER
Referral coordinator informed the pt was seen by Audiology [facility].     Call placed to the pt's daughter to clarify what type of referral is being asked for. Two pt identifier's verified, the pt's daughter states they never saw an ENT specialist. Attempted to discuss this matter with the pt's daughter however she was at work an could not speak on the phone any more.  Referral coordinator  and PCP notified.

## 2024-12-24 ENCOUNTER — HOSPITAL ENCOUNTER (OUTPATIENT)
Facility: HOSPITAL | Age: 79
Setting detail: OUTPATIENT SURGERY
Discharge: HOME | End: 2024-12-24
Attending: SURGERY | Admitting: SURGERY
Payer: MEDICARE

## 2024-12-24 VITALS
SYSTOLIC BLOOD PRESSURE: 139 MMHG | OXYGEN SATURATION: 99 % | HEART RATE: 49 BPM | HEIGHT: 75 IN | TEMPERATURE: 97.8 F | BODY MASS INDEX: 21.14 KG/M2 | WEIGHT: 170 LBS | RESPIRATION RATE: 13 BRPM | DIASTOLIC BLOOD PRESSURE: 87 MMHG

## 2024-12-24 DIAGNOSIS — N18.6 ESRD (END STAGE RENAL DISEASE) (HCC): ICD-10-CM

## 2024-12-24 DIAGNOSIS — T82.898S ARTERIOVENOUS FISTULA OCCLUSION, SEQUELA: ICD-10-CM

## 2024-12-24 LAB
ANION GAP BLD CALC-SCNC: 5.8 MMOL/L (ref 10–20)
ANION GAP SERPL CALC-SCNC: 7 MMOL/L (ref 3–18)
BASOPHILS # BLD: 0 K/UL (ref 0–0.1)
BASOPHILS # BLD: 0 K/UL (ref 0–0.1)
BASOPHILS NFR BLD: 0 % (ref 0–2)
BASOPHILS NFR BLD: 1 % (ref 0–2)
BUN SERPL-MCNC: 78 MG/DL (ref 7–18)
BUN/CREAT SERPL: 6 (ref 12–20)
CA-I BLD-MCNC: 1.14 MMOL/L (ref 1.15–1.33)
CALCIUM SERPL-MCNC: 9.6 MG/DL (ref 8.5–10.1)
CHLORIDE BLD-SCNC: 108 MMOL/L (ref 98–107)
CHLORIDE SERPL-SCNC: 104 MMOL/L (ref 100–111)
CO2 BLD-SCNC: 22.2 MMOL/L (ref 21–32)
CO2 SERPL-SCNC: 24 MMOL/L (ref 21–32)
CREAT BLD-MCNC: 11.36 MG/DL (ref 0.6–1.3)
CREAT SERPL-MCNC: 13.4 MG/DL (ref 0.6–1.3)
DIFFERENTIAL METHOD BLD: ABNORMAL
DIFFERENTIAL METHOD BLD: ABNORMAL
EOSINOPHIL # BLD: 0.2 K/UL (ref 0–0.4)
EOSINOPHIL # BLD: 0.2 K/UL (ref 0–0.4)
EOSINOPHIL NFR BLD: 4 % (ref 0–5)
EOSINOPHIL NFR BLD: 4 % (ref 0–5)
ERYTHROCYTE [DISTWIDTH] IN BLOOD BY AUTOMATED COUNT: 15 % (ref 11.6–14.5)
ERYTHROCYTE [DISTWIDTH] IN BLOOD BY AUTOMATED COUNT: 15 % (ref 11.6–14.5)
GLUCOSE BLD-MCNC: 95 MG/DL (ref 74–99)
GLUCOSE SERPL-MCNC: 95 MG/DL (ref 74–99)
HCT VFR BLD AUTO: 34.3 % (ref 36–48)
HCT VFR BLD AUTO: 34.6 % (ref 36–48)
HGB BLD-MCNC: 10.6 G/DL (ref 13–16)
HGB BLD-MCNC: 10.7 G/DL (ref 13–16)
IMM GRANULOCYTES # BLD AUTO: 0 K/UL (ref 0–0.04)
IMM GRANULOCYTES # BLD AUTO: 0 K/UL (ref 0–0.04)
IMM GRANULOCYTES NFR BLD AUTO: 0 % (ref 0–0.5)
IMM GRANULOCYTES NFR BLD AUTO: 0 % (ref 0–0.5)
INR PPP: 1.1 (ref 0.9–1.1)
INR PPP: 1.1 (ref 0.9–1.1)
LACTATE BLD-SCNC: 1.72 MMOL/L (ref 0.4–2)
LYMPHOCYTES # BLD: 0.8 K/UL (ref 0.9–3.6)
LYMPHOCYTES # BLD: 1 K/UL (ref 0.9–3.6)
LYMPHOCYTES NFR BLD: 18 % (ref 21–52)
LYMPHOCYTES NFR BLD: 20 % (ref 21–52)
MCH RBC QN AUTO: 29.5 PG (ref 24–34)
MCH RBC QN AUTO: 30.1 PG (ref 24–34)
MCHC RBC AUTO-ENTMCNC: 30.6 G/DL (ref 31–37)
MCHC RBC AUTO-ENTMCNC: 31.2 G/DL (ref 31–37)
MCV RBC AUTO: 96.4 FL (ref 78–100)
MCV RBC AUTO: 96.6 FL (ref 78–100)
MONOCYTES # BLD: 0.4 K/UL (ref 0.05–1.2)
MONOCYTES # BLD: 0.5 K/UL (ref 0.05–1.2)
MONOCYTES NFR BLD: 10 % (ref 3–10)
MONOCYTES NFR BLD: 8 % (ref 3–10)
NEUTS SEG # BLD: 3.1 K/UL (ref 1.8–8)
NEUTS SEG # BLD: 3.5 K/UL (ref 1.8–8)
NEUTS SEG NFR BLD: 67 % (ref 40–73)
NEUTS SEG NFR BLD: 67 % (ref 40–73)
NRBC # BLD: 0 K/UL (ref 0–0.01)
NRBC # BLD: 0 K/UL (ref 0–0.01)
NRBC BLD-RTO: 0 PER 100 WBC
NRBC BLD-RTO: 0 PER 100 WBC
PLATELET # BLD AUTO: 85 K/UL (ref 135–420)
PLATELET # BLD AUTO: ABNORMAL K/UL (ref 135–420)
PMV BLD AUTO: 11.3 FL (ref 9.2–11.8)
PMV BLD AUTO: 11.4 FL (ref 9.2–11.8)
POTASSIUM BLD-SCNC: 5.7 MMOL/L (ref 3.5–5.1)
POTASSIUM SERPL-SCNC: 5.4 MMOL/L (ref 3.5–5.5)
PROTHROMBIN TIME: 14.5 SEC (ref 11.9–14.9)
PROTHROMBIN TIME: 14.7 SEC (ref 11.9–14.9)
RBC # BLD AUTO: 3.55 M/UL (ref 4.35–5.65)
RBC # BLD AUTO: 3.59 M/UL (ref 4.35–5.65)
SERVICE CMNT-IMP: ABNORMAL
SODIUM BLD-SCNC: 136 MMOL/L (ref 136–145)
SODIUM SERPL-SCNC: 135 MMOL/L (ref 136–145)
SPECIMEN SITE: ABNORMAL
WBC # BLD AUTO: 4.5 K/UL (ref 4.6–13.2)
WBC # BLD AUTO: 5.2 K/UL (ref 4.6–13.2)

## 2024-12-24 PROCEDURE — C1881 DIALYSIS ACCESS SYSTEM: HCPCS | Performed by: SURGERY

## 2024-12-24 PROCEDURE — 76000 FLUOROSCOPY <1 HR PHYS/QHP: CPT | Performed by: SURGERY

## 2024-12-24 PROCEDURE — 36581 REPLACE TUNNELED CV CATH: CPT | Performed by: SURGERY

## 2024-12-24 PROCEDURE — 76937 US GUIDE VASCULAR ACCESS: CPT | Performed by: SURGERY

## 2024-12-24 PROCEDURE — C1769 GUIDE WIRE: HCPCS | Performed by: SURGERY

## 2024-12-24 PROCEDURE — 6360000004 HC RX CONTRAST MEDICATION: Performed by: SURGERY

## 2024-12-24 PROCEDURE — 80048 BASIC METABOLIC PNL TOTAL CA: CPT

## 2024-12-24 PROCEDURE — C1894 INTRO/SHEATH, NON-LASER: HCPCS | Performed by: SURGERY

## 2024-12-24 PROCEDURE — 2500000003 HC RX 250 WO HCPCS: Performed by: SURGERY

## 2024-12-24 PROCEDURE — 7100000010 HC PHASE II RECOVERY - FIRST 15 MIN: Performed by: SURGERY

## 2024-12-24 PROCEDURE — 83605 ASSAY OF LACTIC ACID: CPT

## 2024-12-24 PROCEDURE — 80047 BASIC METABLC PNL IONIZED CA: CPT

## 2024-12-24 PROCEDURE — 6360000002 HC RX W HCPCS: Performed by: SURGERY

## 2024-12-24 PROCEDURE — 85610 PROTHROMBIN TIME: CPT

## 2024-12-24 PROCEDURE — 2709999900 HC NON-CHARGEABLE SUPPLY: Performed by: SURGERY

## 2024-12-24 PROCEDURE — 85025 COMPLETE CBC W/AUTO DIFF WBC: CPT

## 2024-12-24 RX ORDER — IODIXANOL 320 MG/ML
INJECTION, SOLUTION INTRAVASCULAR PRN
Status: DISCONTINUED | OUTPATIENT
Start: 2024-12-24 | End: 2024-12-24 | Stop reason: HOSPADM

## 2024-12-24 RX ORDER — SODIUM CHLORIDE 9 MG/ML
INJECTION, SOLUTION INTRAVENOUS PRN
Status: DISCONTINUED | OUTPATIENT
Start: 2024-12-24 | End: 2025-01-01 | Stop reason: HOSPADM

## 2024-12-24 RX ORDER — HEPARIN SODIUM 1000 [USP'U]/ML
INJECTION, SOLUTION INTRAVENOUS; SUBCUTANEOUS PRN
Status: DISCONTINUED | OUTPATIENT
Start: 2024-12-24 | End: 2024-12-24 | Stop reason: HOSPADM

## 2024-12-24 NOTE — OP NOTE
Operative Note      Patient: Aniceto Interiano Sr  YOB: 1945  MRN: [id number]    Date of Procedure: 12/24/2024    Pre-op diagnosis:  End-stage renal disease, on hemodialysis through a right femoral vein tunneled hemodialysis catheter, occluded hemodialysis catheter.    Post-Op Diagnosis: Same       Procedure:  1.  Insertion of left internal jugular vein 28 cm Palindrome, tunneled hemodialysis catheter, using ultrasound-guided venous access and fluoroscopic guidance  2.  SVC gram  3.  Removal of right femoral vein TDC    Surgeon(s):  Deidre Howard MD    Assistant:   * No surgical staff found *    Anesthesia: Local    Estimated Blood Loss (mL): Minimal    Complications: None    Specimens:   * No specimens in log *    Findings:  Infection Present At Time Of Surgery (PATOS) (choose all levels that have infection present):  No infection present  Other Findings: Patent left innominate vein and SVC    Detailed Description of Procedure:   The patient is an 79-year-old gentleman, with end-stage renal disease, with history of thrombosed left upper extremity AV graft, on hemodialysis through right femoral vein tunneled hemodialysis catheter, who presents with nonfunctioning hemodialysis catheter.  He was noted to have inferior vena cava chronic thrombus and stenosis, during his previous catheter exchange on 12/11/2024.    Informed consent was obtained from the patient, after discussing with his daughter Karen    The patient was  identified and placed supine on the angiographic table.  The right internal jugular vein was noted to be thrombosed on ultrasound.  The left internal jugular vein was noted to be small but patent on ultrasound.  The left side of the chest and neck were cleaned and draped in a sterile fashion.  He received 2 g Ancef intravenously as preoperative antibiotic prophylaxis.  A timeout was performed.  Under ultrasound guidance, after administering local anesthesia with 1% lidocaine, the left

## 2024-12-24 NOTE — H&P
Aniceto CAMPOS  Sr  No chief complaint on file.  Occluded right groin tunneled hemodialysis catheter    History and Physical    78 y/o M with ESRD undergoing HD via a right femoral vein TDC. He  had 2 LUE brachial axillary AVGs which are thrombosed. Shortly after the incision of the second left arm AV graft,  he developed L hand pain and imaging suggested significant steal from the graft. He has continued to undergo HD via his right groin TDC.  The kidney is required multiple changes-last of which was on 12/11/2024-venogram revealed thrombosed/stenotic inferior vena cava, that was balloon angioplastied on 12/11/2024.  He presents again with occluded right femoral vein tunneled hemodialysis catheter.  His last hemodialysis was on 12/22/2024       Past Medical History:   Diagnosis Date    Atrial fibrillation (Formerly [facility])     DVT (deep venous thrombosis) (Formerly [facility])     ESRD (end stage renal disease) (Formerly [facility])     Hemodialysis patient (Formerly [facility])     [place] (hard of hearing)     Hx of blood clots     Hypertension     TIA (transient ischemic attack)     1994     Patient Active Problem List   Diagnosis    ESRD (end stage renal disease) ([facility]ly [facility])    Hypertension    History of DVT (deep vein thrombosis)    [place] (hard of hearing)    Chronic deep vein thrombosis (DVT) of proximal vein of right lower extremity ([facility])    Thrombocytopenia, unspecified (HCC)    Atrial fibrillation ([facility]ly [facility])    Typical atrial flutter    Chronic anticoagulation    PAD (peripheral artery disease) ([facility]ly [facility])    Shortness of breath    Encounter for evaluation of ability to make decisions regarding care    Moderate dementia ([facility])    Debility    ESRD (end stage renal disease) on dialysis ([facility]ly [facility])    Encounter for palliative care    Goals of care, counseling/discussion    Occlusion of arteriovenous dialysis graft (HCC)    End stage renal disease (HCC)    Arteriovenous fistula occlusion ([facility])     Past Surgical History:   Procedure Laterality Date    CARDIAC PROCEDURE N/A 6/13/2024    Intravascular  ultrasound performed by Alejandro Agee MD at [facility] CARDIAC CATH LAB    DIALYSIS FISTULA CREATION Left 4/19/2024    LEFT UPPER EXTREMITY BRACHIAL-AXILLARY ARTERIOVENOUS GRAFT CREATION performed by Alejandro Agee MD at [facility] CARDIAC SURGERY    DIALYSIS FISTULA CREATION Left 8/16/2024    LEFT ARM BRACHIAL AXILLARY ARTERIOVENOUS GRAFT PLACEMENT performed by Alejandro Agee MD at [facility] CARDIAC SURGERY    EYE SURGERY      INVASIVE VASCULAR N/A 1/30/2024    Ultrasound guided vascular access performed by Serjio Anaya MD at [facility] CARDIAC CATH LAB    INVASIVE VASCULAR N/A 4/25/2024    Tunnel dialysis catheter exchange performed by Ricky Martinez MD at [facility] CARDIAC CATH LAB    INVASIVE VASCULAR N/A 6/13/2024    Fistulogram left performed by Alejandro Agee MD at [facility] CARDIAC CATH LAB    INVASIVE VASCULAR N/A 6/13/2024    Angioplasty fistula/dialysis circuit performed by Alejandro Agee MD at [facility] CARDIAC CATH LAB    INVASIVE VASCULAR N/A 6/13/2024    Thrombectomy peripheral artery performed by Alejandro Agee MD at [facility] CARDIAC CATH LAB    INVASIVE VASCULAR N/A 6/13/2024    Tunnel dialysis catheter removal performed by Alejandro Agee MD at [facility] CARDIAC CATH LAB    INVASIVE VASCULAR N/A 6/13/2024    Tunnel dialysis catheter insertion performed by Alejandro Agee MD at [facility] CARDIAC CATH LAB    INVASIVE VASCULAR N/A 11/5/2024    Tunnel dialysis catheter exchange performed by Deidre Howard MD at [facility] CARDIAC CATH LAB    INVASIVE VASCULAR N/A 12/11/2024    Tunnel dialysis catheter exchange/RIGHT GROIN performed by Deidre Howard MD at [facility] CARDIAC CATH LAB    INVASIVE VASCULAR N/A 12/11/2024    Angioplasty peripheral vein performed by Deidre Howard MD at [facility] CARDIAC CATH LAB    LEG SURGERY Left 12/15/2023    LEFT LEG WOUND DEBRIDEMENT WITH SKIN GRAFT APPLICATION WITH KERECIS performed by Serjio Anaya MD at [facility] CARDIAC

## 2024-12-24 NOTE — PROGRESS NOTES
Cath holding summary:    0930: Patient ambulated from waiting area without difficulty, placed on monitor A-fib. A&O x3, no c/o pain. NPO since midnight, ID and allergies verified. H&P reviewed, med rec completed. PIV x 1 inserted, blood sent to lab. Consent ready for signature.    1045: Verbal report given to TIFFANIE Gardner on Health system being transferred to Cath Lab for ordered procedure. Report consisted of patient's Situation, Background, Assessment and Recommendations (SBAR). Information from the following report(s) Nurse Handoff Report, Adult Overview, and MAR was reviewed with the receiving nurse. Opportunity for questions and clarification was provided.    1145: Verbal report received from TIFFANIE Cazares on Health system being received from Cath Lab for routine progression of patient care. Report consisted of patient's Situation, Background, Assessment and Recommendations (SBAR). Information from the following report(s) Nurse Handoff Report, Adult Overview, Surgery Report, and MAR was reviewed with the receiving nurse. Pt A&O x4, no c/o pain. Opportunity for questions and clarification provided.  Procedure: Tunneled Dialysis Catheter  Intervention: Yes  Site: [facility]     1215: AVS Discharge instructions reviewed with patient and copy given to patient.  All questions answered.  Patient verbalized understanding to all discharge instructions, including when to resume all medications prescribed.  PIV removed. Procedural site within normal limits.  No hematoma or bleeding noted from procedural and PIV site. No pain noted at discharge. Patient back to neurological baseline, A&O x 4. Patient discharged in the presence of a responsible adult (daughter, Karen Interiano) who will accompany patient home and is able to report post procedure complications.

## 2024-12-26 ENCOUNTER — CARE COORDINATION (OUTPATIENT)
Facility: CLINIC | Age: 79
End: 2024-12-26

## 2024-12-26 LAB — ECHO BSA: 2.02 M2

## 2024-12-28 NOTE — TELEPHONE ENCOUNTER
Spoke with pt's daughter in regards to referral follow up. Two patient identifier's verified.  Pt's daughter states the pt did go to [facility] 5/2024 and failed the hearing exam. The Hearing speacilist states the pt needs to be referred to ENT for further evaluation. Pt's daughter states she notified the practice and and would like the referral to be placed to Dr. Shane Mcclelland at [facility]. Chart reviewed. Hearing specialist note in media scanned 10/16/24 from 05/02/2024.      Pt's daughter informed will notify PCP regarding this note.

## 2024-12-31 ENCOUNTER — CARE COORDINATION (OUTPATIENT)
Facility: CLINIC | Age: 79
End: 2024-12-31

## 2024-12-31 NOTE — CARE COORDINATION
Noted Pt. Is still currently admitted to [facility]. Will follow upon discharge .

## 2025-01-03 ENCOUNTER — CARE COORDINATION (OUTPATIENT)
Facility: CLINIC | Age: 80
End: 2025-01-03

## 2025-01-03 NOTE — CARE COORDINATION
Care Transitions Note    Follow Up Call     Attempted to reach patient for transitions of care follow up and to verify if Pt needs ENT or audiology for hearing problem .  Unable to reach patient.      Outreach Attempts:   HIPAA compliant voicemail left for patient.       Follow Up Appointment:   Future Appointments         Provider Specialty Dept Phone    2/6/2025 9:30 AM Neeraj Martinez MD Cardiology [phone]            Plan for follow-up call in 6-10 days based on severity of symptoms and risk factors. Plan for next call:  verify if Pt.  Needs ENT/ Audiology, assess for any needs    Radha Panchal RN

## 2025-01-08 ENCOUNTER — TELEPHONE (OUTPATIENT)
Facility: CLINIC | Age: 80
End: 2025-01-08

## 2025-01-08 NOTE — TELEPHONE ENCOUNTER
Patient's daughter is calling in to let you know the patient is refusing to go to dialysis, just wanted to le yo know.

## 2025-01-15 ENCOUNTER — CARE COORDINATION (OUTPATIENT)
Facility: CLINIC | Age: 80
End: 2025-01-15

## 2025-01-15 NOTE — CARE COORDINATION
Care Transitions Note    Follow Up Call     Attempted to reach patient for transitions of care follow up and to verify if Pt needs ENT or audiology for hearing problem .  Unable to reach patient/family.      Outreach Attempts:   HIPAA compliant voicemail left for patient.     I have been unable to reach Patient/family  on phone.   Care Transition Program resolved.     Follow Up Appointment:   Future Appointments         Provider Specialty Dept Phone    2/6/2025 9:30 AM Neeraj Martinez MD Cardiology [phone]          Radha Panchal RN

## 2025-01-21 ENCOUNTER — TELEPHONE (OUTPATIENT)
Facility: CLINIC | Age: 80
End: 2025-01-21

## 2025-01-21 NOTE — TELEPHONE ENCOUNTER
Please return call to [facility] at [facility] dialysis regarding patients care she has concerns regarding his care, weakness and failure to thrive please return call when available

## 2025-01-22 ENCOUNTER — TELEPHONE (OUTPATIENT)
Facility: CLINIC | Age: 80
End: 2025-01-22

## 2025-01-22 NOTE — TELEPHONE ENCOUNTER
Patients daughter called in to have the following medications refilled due to him having green mucus coming out of his nose again:    azithromycin (ZITHROMAX) 250 MG tablet   guaiFENesin (MUCINEX) 600 MG extended release tablet       Pharmacy: [facility] on  [address] [place] [address]       Future Appointments   Date Time Provider Department Center   2/6/2025  9:30 AM Neeraj Martinez MD [facility]

## 2025-01-22 NOTE — TELEPHONE ENCOUNTER
Received a return call from [facility],  from [facility] regarding safety concerns about the pt. Two pt identifier's verified, staff states the they have concern about the pt safety arriving to their location. Staffs states the pt is transported via his family's vehicle. Staff states on Monday the pt is too weak to stand and get out of the car in their parking lot. The pt's granddaughter mentioned to [facility], the pt had fell recently and hurt his knee. Pt was sent to the ED and released.  Staff states they are a \"no-lift facility\" meaning they are unable to provide physical lifting assistance. Staff states requesting any documenting indicating the pt's physical limitations or physical therapy notes. Staff is hoping the to secure medical stretcher services. Staff states the pt's daughter informed that this practice is working on placing the pt at a long term care facility and awaiting paperwork for completion of this.The staff states the pt does not have a MSW currently at [facility] and requesting any support for the pt. PCP notified.     [facility]    cell phone [phone]  Fax [phone]

## 2025-01-22 NOTE — TELEPHONE ENCOUNTER
I am not in the process of placing the pt in long term facility care that is not in my scope of medicine to do so nor did I ever say this. Furthermore, I have not seen the pt since June 2024. He will need an office visit to address the concerns that are being brought forth by family and staff.

## 2025-02-07 NOTE — TELEPHONE ENCOUNTER
Noted. EPIC chart reviewed. Pt was discharged from [facility] and placed at      [facility]   Phone: [phone]   Fax: [phone]   Where: [address] [id number]   Service: Skilled Nursing

## (undated) DEVICE — LIQUIBAND RAPID ADHESIVE 36/CS 0.8ML: Brand: MEDLINE

## (undated) DEVICE — APPLICATOR MEDICATED 26 CC SOLUTION HI LT ORNG CHLORAPREP

## (undated) DEVICE — SOLUTION IRRIG 1000ML STRL H2O USP PLAS POUR BTL

## (undated) DEVICE — BLANKET WRM W40.2XL55.9IN IORT LO BODY + MISTRAL AIR

## (undated) DEVICE — SUTURE PERMA-HAND SZ 2-0 L30IN NONABSORBABLE BLK L26MM SH K833H

## (undated) DEVICE — BLADE CLIPPER GEN PURP NS

## (undated) DEVICE — 48" PROBE COVER W/GEL, ULTRASOUND, STERILE: Brand: SITE-RITE

## (undated) DEVICE — CATHETER 5FR CORDIS MPA 2 100CM

## (undated) DEVICE — RADIFOCUS GLIDEWIRE ADVANTAGE GUIDEWIRE: Brand: GLIDEWIRE ADVANTAGE

## (undated) DEVICE — ELECTRODE PT RET AD L9FT HI MOIST COND ADH HYDRGEL CORDED

## (undated) DEVICE — NEEDLE ANGIO 1 WALL ULT SMOOTH 19GAX7CM MERIT AVANCE

## (undated) DEVICE — KIT OR TURNOVER

## (undated) DEVICE — SET EXTN 4ML L32IN 4 W STPCOCK SPIN M LUERLOCK STD BOR

## (undated) DEVICE — APPLIER CLP L9.375IN APER 2.1MM CLS L3.8MM 20 SM TI CLP

## (undated) DEVICE — INTRODUCER SHTH 6FR CANN L5.5CM DIL TIP 35MM GRN TUNGSTEN

## (undated) DEVICE — ULTRASOUND PROBE COVER

## (undated) DEVICE — 4FR MICROPUNCTURE KIT

## (undated) DEVICE — SUTURE ETHILON SZ 3-0 L18IN NONABSORBABLE BLK L24MM PS-1 3/8 1663G

## (undated) DEVICE — SUTURE REMOVAL TRAY: Brand: MEDLINE INDUSTRIES, INC.

## (undated) DEVICE — PERM CATH PACK: Brand: MEDLINE INDUSTRIES, INC.

## (undated) DEVICE — INTENDED FOR TISSUE SEPARATION, AND OTHER PROCEDURES THAT REQUIRE A SHARP SURGICAL BLADE TO PUNCTURE OR CUT.: Brand: BARD-PARKER ® STAINLESS STEEL BLADES

## (undated) DEVICE — KIT HAD L45CM CATH SYMMETRIC TIP 2 LUMN CATH SFTY SHTH TISS

## (undated) DEVICE — PTA BALLOON DILATATION CATHETER: Brand: MUSTANG™

## (undated) DEVICE — IMPLANTABLE DEVICE: Type: IMPLANTABLE DEVICE | Status: NON-FUNCTIONAL

## (undated) DEVICE — ATLAS® GOLD PTA DILATATION CATHETER 16 MM X 60 MM, 80 CM CATHETER: Brand: ATLAS® GOLD

## (undated) DEVICE — SUTURE PERMAHAND SZ 4-0 L12X30IN NONABSORBABLE BLK SILK A303H

## (undated) DEVICE — Device

## (undated) DEVICE — SUTURE MONOCRYL SZ 4-0 L18IN ABSRB UD L19MM PS-2 3/8 CIR PRIM Y496G

## (undated) DEVICE — DRESSING FOAM DISK DIA1IN H 7MM HYDRPHLC CHG IMPREG IN SL

## (undated) DEVICE — CLEANSER WND CLN DEB SYS IRRISEPT

## (undated) DEVICE — WIRE .035IN AMPLATZ SUPER STIFF TIP 7CM 180CM

## (undated) DEVICE — GLOVE SURG SZ 7.5 L11.73IN FNGR THK9.8MIL STRW LTX POLYMER

## (undated) DEVICE — 3M™ TEGADERM™ TRANSPARENT FILM DRESSING FRAME STYLE, 1624W, 2-3/8 IN X 2-3/4 IN (6 CM X 7 CM), 100/CT 4CT/CASE: Brand: 3M™ TEGADERM™

## (undated) DEVICE — AGENT HEMOSTATIC SURGIFLOW MATRIX KIT W/THROMBIN

## (undated) DEVICE — GAUZE,SPONGE,4"X4",12PLY,STERILE,LF,2'S: Brand: MEDLINE

## (undated) DEVICE — SYRINGE MED 10ML LUERLOCK TIP W/O SFTY DISP

## (undated) DEVICE — CATHETER HD 14.5X33 CM 50 CM STR LT PALINDROME

## (undated) DEVICE — RADIFOCUS GLIDEWIRE: Brand: GLIDEWIRE

## (undated) DEVICE — SUTURE GOR TX SZ 5-0 L30IN NONABSORBABLE L12MM TTC-12 3/8 6M10A

## (undated) DEVICE — PTA BALLOON DILATATION CATHETER: Brand: ATHLETIS™

## (undated) DEVICE — TOWEL,OR,DSP,ST,WHITE,DLX,XR,4/PK,20PK/C: Brand: MEDLINE

## (undated) DEVICE — RADIFOCUS GLIDECATH: Brand: GLIDECATH

## (undated) DEVICE — SUTURE PROL SZ 5-0 L30IN NONABSORBABLE BLU L13MM RB-2 1/2 8710H

## (undated) DEVICE — SUTURE PROL SZ 2-0 L36IN NONABSORBABLE BLU V-7 L26MM 1/2 8977H

## (undated) DEVICE — 3M™ TEGADERM™ TRANSPARENT FILM DRESSING FRAME STYLE, 1626W, 4 IN X 4-3/4 IN (10 CM X 12 CM), 50/CT 4CT/CASE: Brand: 3M™ TEGADERM™

## (undated) DEVICE — SUTURE PERMA-HAND SZ 3-0 L24IN NONABSORBABLE BLK W/O NDL SA74H

## (undated) DEVICE — PROBE VASC 8MHZ WTRPRF

## (undated) DEVICE — DRESSING HEMSTAT W2XL2IN WHT IMPREG KAOLIN SFT NONWOVEN

## (undated) DEVICE — DRAPE,ANGIO,BRACH,STERILE,38X44: Brand: MEDLINE

## (undated) DEVICE — GUIDEWIRE VASC L180CM DIA0.035IN TIP L7CM PTFE S STL STR

## (undated) DEVICE — COPE MANDRIL WIRE GUIDE STAINLESS STEEL: Brand: COPE

## (undated) DEVICE — DEVICE INFL L13IN 40ATM 30ML BASIXTOUCH

## (undated) DEVICE — SOLUTION IV 1000ML 0.9% SOD CHL

## (undated) DEVICE — GAUZE,SPONGE,2"X2",8PLY,STERILE,LF,2'S: Brand: MEDLINE

## (undated) DEVICE — PACK CATH 14.5FR L50CM INSRT L33CM CARBOTHANE W/ TAL

## (undated) DEVICE — SUTURE VICRYL + SZ 3-0 L27IN ABSRB UD L26MM SH 1/2 CIR VCP416H

## (undated) DEVICE — SUTURE GOR TX SZ 4-0 L30IN NONABSORBABLE L13MM TTC-13 3/8 5M02A

## (undated) DEVICE — ADHESIVE SKIN CLSR 0.7ML TOP DERMBND ADV

## (undated) DEVICE — VASCULAR CATH-PACK

## (undated) DEVICE — TOWEL,OR,DSP,ST,WHITE,DLX,XR,2/PK,40PK/C: Brand: MEDLINE

## (undated) DEVICE — CHECK-FLO INTRODUCER SET: Brand: CHECK-FLO

## (undated) DEVICE — SUTURE NONABSORBABLE MONOFILAMENT 6-0 C-1 1X30 IN PROLENE 8706H

## (undated) DEVICE — DRAPE TWL SURG 16X26IN BLU ORB04] ALLCARE INC]

## (undated) DEVICE — SENSOR OXMTR AD PED DISP NSL ALAR SPO2 XHALE ASSURANCE

## (undated) DEVICE — DRESSING,GAUZE,XEROFORM,CURAD,1"X8",ST: Brand: CURAD

## (undated) DEVICE — Device: Brand: VISIONS PV .035 DIGITAL IVUS CATHETER

## (undated) DEVICE — SUTURE MONOCRYL SZ 4-0 L18IN ABSRB UD P-3 L13MM 3/8 CIR PRIM Y494G

## (undated) DEVICE — DRESSING HEMSTAT W4XL4IN 4 PLY WHT IMPREG KAOLIN HYDRPHLC

## (undated) DEVICE — 4FR MICROPUNCTURE KIT STIFFEN

## (undated) DEVICE — COVER US PRB W15XL120CM W/ GEL RUBBERBAND TAPE STRP FLD GEN

## (undated) DEVICE — AGENT HEMOSTATIC SURG ORIGINAL ABS 4X8IN LOOSE KNIT 12/CA

## (undated) DEVICE — Device: Brand: MEDICAL ACTION INDUSTRIES

## (undated) DEVICE — THROMBECTOMY SET: Brand: ANGIOJET™ SOLENT™ PROXI

## (undated) DEVICE — WIRE .035 AMPLATZ SUPER STIFF J-TIP 180CM

## (undated) DEVICE — SHEATH INTRO 4FR L11CM EVAR S STL FLX AND KINK RESIST CANN

## (undated) DEVICE — SUTURE ABSORBABLE BRAIDED 2-0 CT-1 27 IN UD VICRYL J259H

## (undated) DEVICE — DRAPE,UTILTY,TAPE,15X26, 4EA/PK: Brand: MEDLINE

## (undated) DEVICE — SHEATH 10FR 11CM BRITETIP

## (undated) DEVICE — GUIDEWIRE VASC L180CM DIA0.035IN L15CM STR TIP PTFE S STL

## (undated) DEVICE — GLOVE SURG SZ 7 L11.33IN FNGR THK9.8MIL STRW LTX POLYMER

## (undated) DEVICE — INTRODUCER SHTH 10FR CANN L23CM DIL TIP 45MM FUSCHIA W/

## (undated) DEVICE — DECANTER BAG 9": Brand: MEDLINE INDUSTRIES, INC.

## (undated) DEVICE — SUTURE MONOCRYL SZ 4 0 L18IN ABSRB VLT PS 1 L24MM 3 8 CIR REV Y682H

## (undated) DEVICE — INTRODUCER SHTH 8FR CANN L5.5CM DIL TIP 35MM BLU TUNGSTEN

## (undated) DEVICE — TRAY HAD CATH 12FR L20CM HI PRSS 3 LUMN IC SFTY CRV EXTN

## (undated) DEVICE — SUTURE NONABSORBABLE MONOFILAMENT 5-0 C-1 1X24 IN PROLENE 8725H

## (undated) DEVICE — Z DISCONTINUED USE 2220190 SUTURE VICRYL SZ 3-0 L27IN ABSRB UD L26MM SH 1/2 CIR J416H

## (undated) DEVICE — TAPE,CLOTH/SILK,CURAD,3"X10YD,LF,40/CS: Brand: CURAD

## (undated) DEVICE — CHECK-FLO PERFORMER INTRODUCER: Brand: PERFORMER

## (undated) DEVICE — TOWEL SURG W17XL27IN STD BLU COT NONFENESTRATED PREWASHED

## (undated) DEVICE — FOGARTY - HYDRAGRIP SURGICAL - CLAMP INSERTS: Brand: FOGARTY SOFTJAW

## (undated) DEVICE — SUTURE PROL SZ 5-0 L36IN NONABSORBABLE BLU L13MM C-1 3/8 8720H

## (undated) DEVICE — BLADE ES ELASTOMERIC COAT INSUL DURABLE BEND UPTO 90DEG

## (undated) DEVICE — GUIDEWIRE WITH ICE™ HYDROPHILIC COATING: Brand: V-18™ CONTROL WIRE™

## (undated) DEVICE — INTRODUCER SHTH 7FR CANN L5.5CM DIL TIP 35MM ORNG TUNGSTEN

## (undated) DEVICE — SUTURE PERMAHAND SZ 0 L30IN NONABSORBABLE BLK FSL L30MM 3/8 680H

## (undated) DEVICE — NON-WOVEN ADHESIVE WOUND DRESSING: Brand: PRIMAPORE ADHESIVE DRESSING 10*8CM